# Patient Record
Sex: FEMALE | Race: WHITE | NOT HISPANIC OR LATINO | Employment: FULL TIME | URBAN - METROPOLITAN AREA
[De-identification: names, ages, dates, MRNs, and addresses within clinical notes are randomized per-mention and may not be internally consistent; named-entity substitution may affect disease eponyms.]

---

## 2022-07-05 ASSESSMENT — PATIENT HEALTH QUESTIONNAIRE - PHQ9
10. IF YOU CHECKED OFF ANY PROBLEMS, HOW DIFFICULT HAVE THESE PROBLEMS MADE IT FOR YOU TO DO YOUR WORK, TAKE CARE OF THINGS AT HOME, OR GET ALONG WITH OTHER PEOPLE: VERY DIFFICULT
SUM OF ALL RESPONSES TO PHQ QUESTIONS 1-9: 21
SUM OF ALL RESPONSES TO PHQ QUESTIONS 1-9: 21

## 2022-07-07 ENCOUNTER — TELEPHONE (OUTPATIENT)
Dept: FAMILY MEDICINE | Facility: CLINIC | Age: 27
End: 2022-07-07

## 2022-07-07 ENCOUNTER — OFFICE VISIT (OUTPATIENT)
Dept: FAMILY MEDICINE | Facility: CLINIC | Age: 27
End: 2022-07-07
Payer: COMMERCIAL

## 2022-07-07 VITALS
RESPIRATION RATE: 20 BRPM | OXYGEN SATURATION: 90 % | SYSTOLIC BLOOD PRESSURE: 100 MMHG | HEART RATE: 82 BPM | WEIGHT: 173.2 LBS | DIASTOLIC BLOOD PRESSURE: 62 MMHG

## 2022-07-07 DIAGNOSIS — L70.0 ACNE VULGARIS: Primary | ICD-10-CM

## 2022-07-07 DIAGNOSIS — J30.9 ALLERGIC RHINITIS, UNSPECIFIED SEASONALITY, UNSPECIFIED TRIGGER: ICD-10-CM

## 2022-07-07 DIAGNOSIS — F33.2 SEVERE EPISODE OF RECURRENT MAJOR DEPRESSIVE DISORDER, WITHOUT PSYCHOTIC FEATURES (H): ICD-10-CM

## 2022-07-07 DIAGNOSIS — Z79.899 MEDICATION MANAGEMENT: ICD-10-CM

## 2022-07-07 LAB
ANION GAP SERPL CALCULATED.3IONS-SCNC: 11 MMOL/L (ref 7–15)
BUN SERPL-MCNC: 7.8 MG/DL (ref 6–20)
CALCIUM SERPL-MCNC: 9.7 MG/DL (ref 8.6–10)
CHLORIDE SERPL-SCNC: 95 MMOL/L (ref 98–107)
CREAT SERPL-MCNC: 0.71 MG/DL (ref 0.51–0.95)
DEPRECATED HCO3 PLAS-SCNC: 24 MMOL/L (ref 22–29)
GFR SERPL CREATININE-BSD FRML MDRD: >90 ML/MIN/1.73M2
GLUCOSE SERPL-MCNC: 80 MG/DL (ref 70–99)
POTASSIUM SERPL-SCNC: 4 MMOL/L (ref 3.4–5.3)
SODIUM SERPL-SCNC: 130 MMOL/L (ref 136–145)

## 2022-07-07 PROCEDURE — 99203 OFFICE O/P NEW LOW 30 MIN: CPT | Performed by: NURSE PRACTITIONER

## 2022-07-07 PROCEDURE — 80048 BASIC METABOLIC PNL TOTAL CA: CPT | Performed by: NURSE PRACTITIONER

## 2022-07-07 PROCEDURE — 36415 COLL VENOUS BLD VENIPUNCTURE: CPT | Performed by: NURSE PRACTITIONER

## 2022-07-07 RX ORDER — ARIPIPRAZOLE 5 MG/1
TABLET ORAL
COMMUNITY
Start: 2022-07-05 | End: 2023-02-21

## 2022-07-07 RX ORDER — SPIRONOLACTONE 100 MG/1
TABLET, FILM COATED ORAL
COMMUNITY
Start: 2018-06-01 | End: 2022-07-07

## 2022-07-07 RX ORDER — SPIRONOLACTONE 100 MG/1
100 TABLET, FILM COATED ORAL DAILY
Qty: 90 TABLET | Refills: 3 | Status: SHIPPED | OUTPATIENT
Start: 2022-07-07 | End: 2022-10-19

## 2022-07-07 ASSESSMENT — PAIN SCALES - GENERAL: PAINLEVEL: SEVERE PAIN (6)

## 2022-07-07 NOTE — PROGRESS NOTES
Assessment and Plan:     Acne vulgaris  This is controlled with spironolactone.  We will check BMP today.  - spironolactone (ALDACTONE) 100 MG tablet  Dispense: 90 tablet; Refill: 3    Allergic rhinitis, unspecified seasonality, unspecified trigger  Will refer to allergist for further evaluation. She continues Loratadine.   - Adult Allergy/Asthma Referral    Severe episode of recurrent major depressive disorder, without psychotic features (H)  This is uncontrolled.  She recently established with a psychiatrist.  She is starting Abilify.  I recommend close follow-up with her psychiatrist within the month.  She is content with the plan.    Medication management  - Basic metabolic panel  (Ca, Cl, CO2, Creat, Gluc, K, Na, BUN)  - Basic metabolic panel  (Ca, Cl, CO2, Creat, Gluc, K, Na, BUN)      Subjective:     Cindy is a 26 year old female presenting to the clinic for medication management.  Patient is interested in seeing an allergist or an ENT specialist.  Patient states she suffers from chronic allergies.  She has chronic sinus congestion which worsens in the spring.  She occasionally snores.  She denies postnasal drainage, sneezing, itchy eyes.  She denies history of allergy testing the past.  She has tried numerous antihistamines in the past and has been taking Claritin daily.  She does have a dog at home.  Patient saw dermatology previously for acne.  She is prescribed spironolactone which has provided significant assistance.  She requests refill today.  Patient has depression which is uncontrolled.  She is seeing an online psychiatrist who prescribed Abilify 5 mg daily.  Patient just picked up the prescription and plans on starting this today.  She denies thoughts of suicide.  Patient would like to have her Nexplanon removed.  We will assist with scheduling her with a provider to do so.    Reviewof Systems: A complete 14 point review of systems was obtained and is negative or as stated in the history of  present illness.    Social History     Socioeconomic History     Marital status: Single     Spouse name: Not on file     Number of children: Not on file     Years of education: Not on file     Highest education level: Not on file   Occupational History     Not on file   Tobacco Use     Smoking status: Former Smoker     Packs/day: 0.25     Years: 5.00     Pack years: 1.25     Types: Vaping Device, Cigarettes     Quit date: 2020     Years since quittin.1     Smokeless tobacco: Never Used   Vaping Use     Vaping Use: Every day     Substances: Nicotine     Devices: Pre-filled pod   Substance and Sexual Activity     Alcohol use: Yes     Alcohol/week: 12.0 standard drinks     Types: 12 Cans of beer per week     Drug use: Never     Sexual activity: Yes     Partners: Male     Birth control/protection: Implant     Comment: Nexplanon   Other Topics Concern     Parent/sibling w/ CABG, MI or angioplasty before 65F 55M? No   Social History Narrative     Not on file     Social Determinants of Health     Financial Resource Strain: Not on file   Food Insecurity: Not on file   Transportation Needs: Not on file   Physical Activity: Not on file   Stress: Not on file   Social Connections: Not on file   Intimate Partner Violence: Not on file   Housing Stability: Not on file       Active Ambulatory Problems     Diagnosis Date Noted     Major Depression, Single Episode      Urinary Tract Infection      Pain During Urination (Dysuria)      Resolved Ambulatory Problems     Diagnosis Date Noted     No Resolved Ambulatory Problems     Past Medical History:   Diagnosis Date     Depressive disorder 2014       Family History   Problem Relation Age of Onset     Depression Mother      Anxiety Disorder Father      Other Cancer Maternal Grandfather      Diabetes Paternal Grandmother      Anxiety Disorder Sister        Objective:     /62 (BP Location: Right arm, Patient Position: Sitting, Cuff Size: Adult Regular)   Pulse 82   Resp 20    Wt 78.6 kg (173 lb 3.2 oz)   LMP  (LMP Unknown)   SpO2 90%     Patient is alert, in no obvious distress.   Skin: Warm, dry.  No lesions or rashes.    Lungs:  Clear to auscultation. Respirations even and unlabored.  No wheezing or rales noted.   Heart:  Regular rate and rhythm.  No murmurs, S3, S4, gallops, or rubs.

## 2022-07-07 NOTE — TELEPHONE ENCOUNTER
lmtcb- pt has an apt with Siobhan today to discuss nexplanon removal, Siobhan does not do this procedure. Please let pt know if she wants it taken out it will have to be with another provider. Otherwise Siobhan will still be able to see her for an office visit for other concerns

## 2022-07-28 ENCOUNTER — OFFICE VISIT (OUTPATIENT)
Dept: FAMILY MEDICINE | Facility: CLINIC | Age: 27
End: 2022-07-28
Payer: COMMERCIAL

## 2022-07-28 VITALS — SYSTOLIC BLOOD PRESSURE: 107 MMHG | DIASTOLIC BLOOD PRESSURE: 67 MMHG | WEIGHT: 174.19 LBS | HEART RATE: 86 BPM

## 2022-07-28 DIAGNOSIS — Z30.46 NEXPLANON REMOVAL: Primary | ICD-10-CM

## 2022-07-28 PROCEDURE — 11982 REMOVE DRUG IMPLANT DEVICE: CPT | Performed by: FAMILY MEDICINE

## 2022-07-28 ASSESSMENT — PAIN SCALES - GENERAL: PAINLEVEL: NO PAIN (0)

## 2022-07-28 NOTE — PROGRESS NOTES
Assessment & Plan     Nexplanon removal    - Remove Contraceptive Implant - Nexplanon/Implanon     Nexplanon removal: Procedure and risks discussed with patient who voiced understanding and agreed to proceed with removal of implant.  Implant was identified in medial aspect of right upper arm and pen used to boo distal end of implant.  Skin cleansed with alcohol swab.  Local anesthetic administered with 1% lidocaine with epinephrine, approximately 2.5 mL. Skin was cleansed with 3 Betadine swabs.  Area draped in sterile fashion.  #15 scalpel was used to make an approximately 1 cm incision near the distal end of implant.  Subcutaneous tissue was dissected and distal end of implant was identified and grasped with a needle montoya.  Subcutaneous skin was further dissected and the implant was then removed in its entirety and placed in biohazard bag for disposal.  Patient tolerated procedure well.  Estimated blood loss minimal.  Skin cleansed with soap and water.  Steri-Strips placed over her incision site to approximate wound edges.  Bandage placed over incision and compression dressing applied.  Patient counseled to leave compression dressing on for 24 hours and then keep wound covered with bandage for 2-3 days.  Discussed she may experience bruising and soreness for the next couple of days.  May use ibuprofen and ice packs as needed for discomfort.  Monitor for signs of infection.  Follow-up if any concerns or problems                 No follow-ups on file.    Cammy Enciso MD  [facility]    Og White is a 26 year old, presenting for the following health issues:  Nexplanon removal      HPI   She comes in today for removal of Nexplanon.  This was placed in February 2021.  No other concerns or questions.  Does not desire alternative contraception at this time.  Meds and allergies reviewed and updated          Review of Systems         Objective    /67   Pulse 86   Wt 79 kg  (174 lb 3 oz)   LMP  (LMP Unknown)   There is no height or weight on file to calculate BMI.  Physical Exam   GENERAL: healthy, alert and no distress  PSYCH: mentation appears normal, affect normal/bright  Skin: nexplanon palpated in medial aspect of right upper arm

## 2022-08-07 ENCOUNTER — HEALTH MAINTENANCE LETTER (OUTPATIENT)
Age: 27
End: 2022-08-07

## 2022-08-11 ENCOUNTER — TELEPHONE (OUTPATIENT)
Dept: FAMILY MEDICINE | Facility: CLINIC | Age: 27
End: 2022-08-11
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
## 2022-08-12 ENCOUNTER — OFFICE VISIT (OUTPATIENT)
Dept: INTERNAL MEDICINE | Facility: CLINIC | Age: 27
End: 2022-08-12
Payer: COMMERCIAL

## 2022-08-12 VITALS
WEIGHT: 174.4 LBS | HEART RATE: 85 BPM | TEMPERATURE: 99.1 F | OXYGEN SATURATION: 99 % | DIASTOLIC BLOOD PRESSURE: 69 MMHG | SYSTOLIC BLOOD PRESSURE: 106 MMHG

## 2022-08-12 DIAGNOSIS — M25.511 ACUTE PAIN OF RIGHT SHOULDER: ICD-10-CM

## 2022-08-12 DIAGNOSIS — S46.819A STRAIN OF TRAPEZIUS MUSCLE, UNSPECIFIED LATERALITY, INITIAL ENCOUNTER: ICD-10-CM
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
## 2022-08-12 DIAGNOSIS — S46.001A ROTATOR CUFF INJURY, RIGHT, INITIAL ENCOUNTER: ICD-10-CM

## 2022-08-12 DIAGNOSIS — M54.2 NECK PAIN: ICD-10-CM

## 2022-08-12 PROCEDURE — 99213 OFFICE O/P EST LOW 20 MIN: CPT | Performed by: INTERNAL MEDICINE

## 2022-08-12 RX ORDER — CYCLOBENZAPRINE HCL 5 MG
5 TABLET ORAL
Qty: 14 TABLET | Refills: 0 | Status: SHIPPED | OUTPATIENT
Start: 2022-08-12 | End: 2022-12-19

## 2022-08-12 ASSESSMENT — PAIN SCALES - GENERAL: PAINLEVEL: EXTREME PAIN (8)

## 2022-08-12 ASSESSMENT — PATIENT HEALTH QUESTIONNAIRE - PHQ9
SUM OF ALL RESPONSES TO PHQ QUESTIONS 1-9: 13
SUM OF ALL RESPONSES TO PHQ QUESTIONS 1-9: 13
10. IF YOU CHECKED OFF ANY PROBLEMS, HOW DIFFICULT HAVE THESE PROBLEMS MADE IT FOR YOU TO DO YOUR WORK, TAKE CARE OF THINGS AT HOME, OR GET ALONG WITH OTHER PEOPLE: SOMEWHAT DIFFICULT

## 2022-08-12 NOTE — PATIENT INSTRUCTIONS
PT. Myofascial release, for neck and shoulder.   Ibuprofen 400-600mg, with food  2-3 times a day with food.  Muscle relaxant at bed time: Flexeril   Take vitamin D supplement 1000 units a day.
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
## 2022-08-15 ENCOUNTER — VIRTUAL VISIT (OUTPATIENT)
Dept: FAMILY MEDICINE | Facility: CLINIC | Age: 27
End: 2022-08-15
Payer: COMMERCIAL

## 2022-08-15 DIAGNOSIS — Z30.016 ENCOUNTER FOR INITIAL PRESCRIPTION OF TRANSDERMAL PATCH HORMONAL CONTRACEPTIVE DEVICE: Primary | ICD-10-CM

## 2022-08-15 PROCEDURE — 99213 OFFICE O/P EST LOW 20 MIN: CPT | Mod: GT | Performed by: PHYSICIAN ASSISTANT

## 2022-08-15 RX ORDER — NORELGESTROMIN AND ETHINYL ESTRADIOL 35; 150 UG/MG; UG/MG
PATCH TRANSDERMAL
Qty: 3 PATCH | Refills: 11 | Status: SHIPPED | OUTPATIENT
Start: 2022-08-15 | End: 2022-12-19

## 2022-08-15 NOTE — PROGRESS NOTES
Cindy is a 26 year old who is being evaluated via a billable video visit.      How would you like to obtain your AVS? MyChart  If the video visit is dropped, the invitation should be resent by: Text to cell phone: [phone]  Will anyone else be joining your video visit? No        Assessment & Plan       5. Encounter for initial prescription of transdermal patch hormonal contraceptive device  Discussed different birth control options including copper IUD, barrier methods, nuva ring, patch and OCP.  She is not interested in nexplanon again. She wants to try the patch.  I also suggested a low dose pill if she wants to consider other options. Uses and SE of the patch discussed.  She will follow up with her PCP as needed.  Due for pap.  - norelgestromin-ethinyl estradiol (ORTHO EVRA) 150-35 MCG/24HR patch; Remove old patch and apply new patch onto the skin once a week for 3 weeks (21 days). Do not wear patch week 4 (days 22-28), then repeat.  Dispense: 3 patch; Refill: 11      [facility]   Cindy is a 26 year old, presenting for the following health issues:  Contraception      Contraception       Cindy presents via video visit to discuss contraception.   She has tried various forms of contraception ( OCP, Nexplanon, DEPO, IUD).  She had SE with each different type.  Most recently she had her Nexplanon removed.   She is interested in trying transdermal patch.  She struggled with her mood while on OCP several years ago. Does not recall which pill this was.   Periods are typically regular, last was 2 weeks ago.  No current concerns for pregnancy.    Review of Systems   Constitutional, HEENT, cardiovascular, pulmonary, GI, , musculoskeletal, neuro, skin, endocrine and psych systems are negative, except as otherwise noted.      Objective    Vitals - Patient Reported  Weight (Patient Reported): 78.9 kg (174 lb)  Height (Patient Reported):  "154.3 cm (5' 0.75\")  BMI (Based on Pt Reported Ht/Wt): 33.15        Physical Exam   GENERAL: Healthy, alert and no distress  EYES: Eyes grossly normal to inspection.  No discharge or erythema, or obvious scleral/conjunctival abnormalities.  RESP: No audible wheeze, cough, or visible cyanosis.  No visible retractions or increased work of breathing.    SKIN: Visible skin clear. No significant rash, abnormal pigmentation or lesions.  NEURO: Cranial nerves grossly intact.  Mentation and speech appropriate for age.  PSYCH: Mentation appears normal, affect normal/bright, judgement and insight intact, normal speech and appearance well-groomed.        Video-Visit Details    Video Start Time:2 pm    Type of service:  Video Visit    Video End Time 220 pm    Originating Location (pt. Location): Home    Distant Location (provider location):  [facility]     Platform used for Video Visit: [employer]    .  ..  "

## 2022-08-16 ENCOUNTER — HOSPITAL ENCOUNTER (OUTPATIENT)
Dept: PHYSICAL THERAPY | Facility: REHABILITATION | Age: 27
Discharge: HOME | End: 2022-08-16
Attending: INTERNAL MEDICINE
Payer: COMMERCIAL

## 2022-08-16 DIAGNOSIS — S46.001A ROTATOR CUFF INJURY, RIGHT, INITIAL ENCOUNTER: ICD-10-CM

## 2022-08-16 DIAGNOSIS — M54.2 NECK PAIN: Primary | ICD-10-CM

## 2022-08-16 DIAGNOSIS — S46.819A STRAIN OF TRAPEZIUS MUSCLE, UNSPECIFIED LATERALITY, INITIAL ENCOUNTER: ICD-10-CM

## 2022-08-16 DIAGNOSIS — M25.511 ACUTE PAIN OF RIGHT SHOULDER: ICD-10-CM
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
## 2022-08-16 PROCEDURE — 97110 THERAPEUTIC EXERCISES: CPT | Mod: GP | Performed by: PHYSICAL THERAPIST

## 2022-08-16 PROCEDURE — 97161 PT EVAL LOW COMPLEX 20 MIN: CPT | Mod: GP | Performed by: PHYSICAL THERAPIST
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
## 2022-08-23 ENCOUNTER — HOSPITAL ENCOUNTER (OUTPATIENT)
Dept: PHYSICAL THERAPY | Facility: REHABILITATION | Age: 27
Discharge: HOME | End: 2022-08-23
Payer: COMMERCIAL

## 2022-08-23 DIAGNOSIS — S46.819A STRAIN OF TRAPEZIUS MUSCLE, UNSPECIFIED LATERALITY, INITIAL ENCOUNTER: ICD-10-CM
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
## 2022-08-23 DIAGNOSIS — M25.511 ACUTE PAIN OF RIGHT SHOULDER: ICD-10-CM

## 2022-08-23 DIAGNOSIS — M54.2 NECK PAIN: ICD-10-CM

## 2022-08-23 DIAGNOSIS — S46.001A ROTATOR CUFF INJURY, RIGHT, INITIAL ENCOUNTER: ICD-10-CM

## 2022-08-23 PROCEDURE — 97110 THERAPEUTIC EXERCISES: CPT | Mod: GP | Performed by: PHYSICAL THERAPIST

## 2022-08-23 PROCEDURE — 97140 MANUAL THERAPY 1/> REGIONS: CPT | Mod: GP | Performed by: PHYSICAL THERAPIST

## 2022-08-30 NOTE — TELEPHONE ENCOUNTER
Spoke with pt about NS#1- did not have this appointment on her schedule. Reviewed remaining PT appointments and pt verbalized she has them on her schedule as well.    Jennifer Robertson, PT

## 2022-09-13 ENCOUNTER — TELEPHONE (OUTPATIENT)
Dept: PHYSICAL THERAPY | Facility: REHABILITATION | Age: 27
End: 2022-09-13

## 2022-09-20 ENCOUNTER — TELEPHONE (OUTPATIENT)
Dept: PHYSICAL THERAPY | Facility: REHABILITATION | Age: 27
End: 2022-09-20

## 2022-09-20 NOTE — PROGRESS NOTES
[facility]    Outpatient Physical Therapy Discharge Note  Patient: Cindy Contreras  : 1995    Beginning/End Dates of Reporting Period:  22 to 22    Referring Provider: Julianne Loza MD    Therapy Diagnosis: acute rose marie cervical spine pain, acute right shoulder pain, myofascial pain     Client Self Report: Pt reports locked jaw about every other day that resolves within 5-10 min. It can be a side effect of one of her medications that she started about 2 months.    Objective Measurements:  Objective Measure: Pain: 5-6/10 lower cervical and upper thoracic spine R>L     Objective Measure: Cervical AROM  Details: rotation:R mild with ERP, L WNL         Goals:  Goal Identifier HEP   Goal Description Pt will be independent in HEP to address impairments and improve pain/function   Target Date 09/15/22   Date Met      Progress (detail required for progress note): working on daily- will continue to progress     Goal Identifier Pain   Goal Description Pt will report <3/10 pain in her neck and right shoulder throughout the day to improve her quality of life   Target Date 22   Date Met      Progress (detail required for progress note):       Goal Identifier Sleeping   Goal Description Pt will be able to sleep without waking from pain >5 nights per week to improve quality of sleep and return to prior level of function   Target Date 22   Date Met      Progress (detail required for progress note): improving- still waking up but not for as long     Goal Identifier Lifting   Goal Description Pt will be able to lift 20# without pain for work and household chores   Target Date 22   Date Met      Progress (detail required for progress note):             Plan:  Discharge from therapy.    Discharge:    Reason for Discharge: Pt discharged per attendance policy (2 no show and 2 late cancelled  appts). Pt will need a new order to return to PT.     Equipment Issued: none    Discharge Plan: Patient to continue home program. Pt to follow up with PCP if needed.    Jennifer Robertson, PT, DPT, LIZETH

## 2022-09-20 NOTE — ADDENDUM NOTE
Encounter addended by: Jennifer Robertson, PT on: 9/20/2022 9:50 AM   Actions taken: Episode resolved, Clinical Note Signed

## 2022-09-20 NOTE — TELEPHONE ENCOUNTER
Spoke with pt about no shows and late cancelled appt. Per attendance policy, pt's remaining appts will be cancelled and she will need a new order to return to PT. Pt felt her work schedule made it difficult for her to attend PT. Pt states she felt she was doing better and will continue with her exercises. Pt to follow up with doctor if pain is not continuing to improve or she would like to re-start PT.    Jennifer Robertson, PT

## 2022-10-19 DIAGNOSIS — L70.0 ACNE VULGARIS: ICD-10-CM

## 2022-10-19 RX ORDER — SPIRONOLACTONE 100 MG/1
100 TABLET, FILM COATED ORAL DAILY
Qty: 90 TABLET | Refills: 3 | Status: SHIPPED | OUTPATIENT
Start: 2022-10-19 | End: 2023-09-14

## 2022-10-19 NOTE — TELEPHONE ENCOUNTER
Pending Prescriptions:                       Disp   Refills    spironolactone (ALDACTONE) 100 MG tablet  90 tab*3            Sig: Take 1 tablet (100 mg) by mouth daily    Pt is calling stating she is having issuing filling the prescription at [facility] and would like to switch to [facility]. Patient has been out for 5 days because they are still processing and just wants to get her prescription.     Please send over as soon as possible.

## 2022-10-23 ENCOUNTER — HEALTH MAINTENANCE LETTER (OUTPATIENT)
Age: 27
End: 2022-10-23

## 2022-11-03 ENCOUNTER — OFFICE VISIT (OUTPATIENT)
Dept: ALLERGY | Facility: CLINIC | Age: 27
End: 2022-11-03
Payer: COMMERCIAL

## 2022-11-03 VITALS — BODY MASS INDEX: 31.15 KG/M2 | OXYGEN SATURATION: 96 % | HEIGHT: 61 IN | HEART RATE: 86 BPM | WEIGHT: 165 LBS

## 2022-11-03 DIAGNOSIS — J30.1 SEASONAL ALLERGIC RHINITIS DUE TO POLLEN: Primary | ICD-10-CM

## 2022-11-03 DIAGNOSIS — J30.89 ALLERGIC RHINITIS CAUSED BY MOLD: ICD-10-CM

## 2022-11-03 DIAGNOSIS — J30.9 ALLERGIC RHINITIS, UNSPECIFIED SEASONALITY, UNSPECIFIED TRIGGER: ICD-10-CM

## 2022-11-03 DIAGNOSIS — J30.89 ALLERGIC RHINITIS DUE TO DUST MITE: ICD-10-CM

## 2022-11-03 DIAGNOSIS — Z91.018 TREE NUT ALLERGY: ICD-10-CM

## 2022-11-03 DIAGNOSIS — R09.81 NASAL CONGESTION: ICD-10-CM

## 2022-11-03 DIAGNOSIS — J30.81 ALLERGIC RHINITIS DUE TO ANIMALS: ICD-10-CM

## 2022-11-03 PROCEDURE — 99204 OFFICE O/P NEW MOD 45 MIN: CPT | Mod: 25 | Performed by: ALLERGY & IMMUNOLOGY

## 2022-11-03 PROCEDURE — 95004 PERQ TESTS W/ALRGNC XTRCS: CPT | Performed by: ALLERGY & IMMUNOLOGY

## 2022-11-03 RX ORDER — AZELASTINE 1 MG/ML
2 SPRAY, METERED NASAL 2 TIMES DAILY
Qty: 30 ML | Refills: 3 | Status: SHIPPED | OUTPATIENT
Start: 2022-11-03 | End: 2023-01-23

## 2022-11-03 RX ORDER — EPINEPHRINE 0.3 MG/.3ML
INJECTION SUBCUTANEOUS
Qty: 4 EACH | Refills: 0 | Status: SHIPPED | OUTPATIENT
Start: 2022-11-03 | End: 2023-01-23

## 2022-11-03 RX ORDER — ESCITALOPRAM OXALATE 5 MG/1
5 TABLET ORAL DAILY
COMMUNITY
Start: 2022-10-19 | End: 2023-07-22

## 2022-11-03 NOTE — LETTER
11/3/2022         RE: Cindy Contreras  [address]  [address]  [place] [address]        Dear Colleague,    Thank you for referring your patient, Cindy Contreras, to the [facility]. Please see a copy of my visit note below.          Og Manning is a 27 year old, presenting for the following health issues:  Allergy Consult      HPI   Chief complaint: Allergy concerns    History of present illness: This is a pleasant 27-year-old woman I was asked to see for evaluation by Siobhan Covington in regards to allergies.  Patient states that she had allergy symptoms her whole life.  She states symptoms worsen during the spring and fall but she does have symptoms throughout the year.  Symptoms consist of nasal congestion, runny nose, itchy eyes, drainage.  She states that she uses Claritin which helps.  Does not completely resolve the symptoms, however.  She tried Flonase regularly which did not improve symptoms.  She did note she had a reaction to tree nuts when she was 12 years of age.  She had tongue swelling and itchy throat.  She never been tested for this and does not carry a current epinephrine device.  She states she has them on occasion and noticed itchy mouth and throat.  She did have 1 episode where she ate a lot of nuts and had throwing up.  No history of asthma.  No cough, wheeze or shortness of breath.  She does note itching with raw soy as well as raw apples.  She does note a lot of nasal congestion and wonders if she had nasal polyps.  She is never been seen by ENT.  She does note a slightly decreased sense of smell but she states she was a smoker and wonders if this affects her sense of smell.    Past medical history: Bipolar II    Social history: She works in , has a dog, lives in an apartment without central air, has a basement, radiator heat, former smoker    Family history: Father with allergies      Review of Systems   Constitutional, HEENT,  "cardiovascular, pulmonary, GI, , musculoskeletal, neuro, skin, endocrine and psych systems are negative, except as otherwise noted.     Objective    Pulse 86   Ht 1.549 m (5' 1\")   Wt 74.8 kg (165 lb)   LMP 11/01/2022 (Exact Date)   SpO2 96%   BMI 31.18 kg/m    Body mass index is 31.18 kg/m .  Physical Exam   Gen: Pleasant female not in acute distress  HEENT: Eyes no erythema of the bulbar or palpebral conjunctiva, no edema. Ears: TMs well visualized, no effusions. Nose: congestion, Thick mucous on the left near the inferior turbinate, difficult to tell if this is a polyp,mucosa normal. Mouth: Throat clear, no lip or tongue edema.   Cardiac: Regular rate and rhythm, no murmurs, rubs or gallops  Respiratory: Clear to auscultation bilaterally, no adventitious breath sounds  Lymph: No   Visible supraclavicular or cervical lymphadenopathy  Skin: No rashes or lesions  Psych: Alert and oriented times 3    37 percutaneous test were undertaken to the environmental skin test panel and tree nut.  Positive histamine control with positive test to walnut, pecan, cashew, pistachio, tree pollen, grass pollen, weed pollen, mold, dust mites and cat and dog.  Please see scanned photographs.    Impression report and plan:  1.  Allergic rhinitis  2.  Oral allergy syndrome  3.  Possible true tree nut allergy    Recommend that the patient carry an epinephrine device given her vomiting she has had previously with tree nuts and her responses today.  This could be secondary to oral allergy syndrome, however.  Reviewed environmental control.  Avoid foods that cause difficulty in the raw form.  I went over the risks and benefits of allergy shots.  I stated risks include hives, swelling, shortness of breath.  I did state that one in 2.5 million shot administrations can result in death.  I stated they must wait in the office for 30 minutes following the shot and carry an epinephrine device on the day of the shot.  I stated that shots are " effective in about 90% of patients.  I stated that they should check with the insurance company prior to proceeding.  They understand the risks and benefits and agreed to proceed.  Both consent forms were signed and scanned into the record.    4.  Concern for nasal polyps    Patient is concerned about nasal polyps.  Difficult to tell today on exam.  Recommended ENT referral.                 Again, thank you for allowing me to participate in the care of your patient.        Sincerely,        Idania NEELY MD

## 2022-11-03 NOTE — PROGRESS NOTES
"      Og Manning is a 27 year old, presenting for the following health issues:  Allergy Consult      HPI   Chief complaint: Allergy concerns    History of present illness: This is a pleasant 27-year-old woman I was asked to see for evaluation by Siobhan Covington in regards to allergies.  Patient states that she had allergy symptoms her whole life.  She states symptoms worsen during the spring and fall but she does have symptoms throughout the year.  Symptoms consist of nasal congestion, runny nose, itchy eyes, drainage.  She states that she uses Claritin which helps.  Does not completely resolve the symptoms, however.  She tried Flonase regularly which did not improve symptoms.  She did note she had a reaction to tree nuts when she was 12 years of age.  She had tongue swelling and itchy throat.  She never been tested for this and does not carry a current epinephrine device.  She states she has them on occasion and noticed itchy mouth and throat.  She did have 1 episode where she ate a lot of nuts and had throwing up.  No history of asthma.  No cough, wheeze or shortness of breath.  She does note itching with raw soy as well as raw apples.  She does note a lot of nasal congestion and wonders if she had nasal polyps.  She is never been seen by ENT.  She does note a slightly decreased sense of smell but she states she was a smoker and wonders if this affects her sense of smell.    Past medical history: Bipolar II    Social history: She works in , has a dog, lives in an apartment without central air, has a basement, radiator heat, former smoker    Family history: Father with allergies      Review of Systems   Constitutional, HEENT, cardiovascular, pulmonary, GI, , musculoskeletal, neuro, skin, endocrine and psych systems are negative, except as otherwise noted.      Objective    Pulse 86   Ht 1.549 m (5' 1\")   Wt 74.8 kg (165 lb)   LMP 11/01/2022 (Exact Date)   SpO2 96%   BMI 31.18 kg/m    Body " mass index is 31.18 kg/m .  Physical Exam   Gen: Pleasant female not in acute distress  HEENT: Eyes no erythema of the bulbar or palpebral conjunctiva, no edema. Ears: TMs well visualized, no effusions. Nose: congestion, Thick mucous on the left near the inferior turbinate, difficult to tell if this is a polyp,mucosa normal. Mouth: Throat clear, no lip or tongue edema.   Cardiac: Regular rate and rhythm, no murmurs, rubs or gallops  Respiratory: Clear to auscultation bilaterally, no adventitious breath sounds  Lymph: No   Visible supraclavicular or cervical lymphadenopathy  Skin: No rashes or lesions  Psych: Alert and oriented times 3    37 percutaneous test were undertaken to the environmental skin test panel and tree nut.  Positive histamine control with positive test to walnut, pecan, cashew, pistachio, tree pollen, grass pollen, weed pollen, mold, dust mites and cat and dog.  Please see scanned photographs.    Impression report and plan:  1.  Allergic rhinitis  2.  Oral allergy syndrome  3.  Possible true tree nut allergy    Recommend that the patient carry an epinephrine device given her vomiting she has had previously with tree nuts and her responses today.  This could be secondary to oral allergy syndrome, however.  Reviewed environmental control.  Avoid foods that cause difficulty in the raw form.  I went over the risks and benefits of allergy shots.  I stated risks include hives, swelling, shortness of breath.  I did state that one in 2.5 million shot administrations can result in death.  I stated they must wait in the office for 30 minutes following the shot and carry an epinephrine device on the day of the shot.  I stated that shots are effective in about 90% of patients.  I stated that they should check with the insurance company prior to proceeding.  They understand the risks and benefits and agreed to proceed.  Both consent forms were signed and scanned into the record.    4.  Concern for nasal  polyps    Patient is concerned about nasal polyps.  Difficult to tell today on exam.  Recommended ENT referral.

## 2022-11-03 NOTE — LETTER
ANAPHYLAXIS ALLERGY PLAN    Name: Cindy Contreras      :  1995    Allergy to:  Tree nut    Weight: 165 lbs 0 oz           Asthma:  No      Do not depend on antihistamines or inhalers (bronchodilators) to treat a severe reaction; USE EPINEPHRINE      MEDICATIONS/DOSES  Epinephrine:    Epinephrine dose:  0.3 mg IM  Antihistamine:  Zyrtec (Cetirizine)  Antihistamine dose:  10 mg        ANAPHYLAXIS ALLERGY PLAN (Page 2)  Patient:  Cindy Contreras  :  1995         Electronically signed on November 3, 2022 by:  Idania NEELY MD  Parent/Guardian Authorization Signature:  ___________________________ Date:    FORM PROVIDED [employer] (FARE) ([email or web address]) 2017

## 2022-11-28 ENCOUNTER — ALLIED HEALTH/NURSE VISIT (OUTPATIENT)
Dept: ALLERGY | Facility: CLINIC | Age: 27
End: 2022-11-28
Payer: COMMERCIAL

## 2022-11-28 DIAGNOSIS — J30.89 ALLERGIC RHINITIS DUE TO DUST MITE: ICD-10-CM

## 2022-11-28 DIAGNOSIS — J30.1 SEASONAL ALLERGIC RHINITIS DUE TO POLLEN: Primary | ICD-10-CM

## 2022-11-28 DIAGNOSIS — J30.81 ALLERGIC RHINITIS DUE TO ANIMALS: ICD-10-CM

## 2022-11-28 DIAGNOSIS — J30.89 ALLERGIC RHINITIS CAUSED BY MOLD: ICD-10-CM

## 2022-11-28 PROCEDURE — 95117 IMMUNOTHERAPY INJECTIONS: CPT

## 2022-11-28 NOTE — PROGRESS NOTES
Cindy Contreras presents to clinic today at the request of Idania Herrera MD (ordering provider) for Allergy Immunotherapy injection(s).       This service provided today was under the care of Idania Herrera MD; the supervising provider of the day; who was available if needed.    Mary Saldaña

## 2022-12-02 ENCOUNTER — ALLIED HEALTH/NURSE VISIT (OUTPATIENT)
Dept: ALLERGY | Facility: CLINIC | Age: 27
End: 2022-12-02
Payer: COMMERCIAL

## 2022-12-02 DIAGNOSIS — J30.89 ALLERGIC RHINITIS DUE TO DUST MITE: ICD-10-CM

## 2022-12-02 DIAGNOSIS — J30.1 SEASONAL ALLERGIC RHINITIS DUE TO POLLEN: Primary | ICD-10-CM

## 2022-12-02 DIAGNOSIS — J30.89 ALLERGIC RHINITIS CAUSED BY MOLD: ICD-10-CM

## 2022-12-02 DIAGNOSIS — J30.81 ALLERGIC RHINITIS DUE TO ANIMALS: ICD-10-CM

## 2022-12-02 PROCEDURE — 95117 IMMUNOTHERAPY INJECTIONS: CPT

## 2022-12-02 PROCEDURE — 95165 ANTIGEN THERAPY SERVICES: CPT | Performed by: ALLERGY & IMMUNOLOGY

## 2022-12-02 NOTE — PROGRESS NOTES
MM/DFM/DPM/AP DOG/RW  1:1000 V/V EXP 1/15/2023  1:100 V/V EXP 3/15/2023  1:10 V/V EXP 11/15/2023  1:1 V/V EXP 11/15/2023    CHARGED 30 UNITS  CHECKED BY LS

## 2022-12-02 NOTE — PROGRESS NOTES
Cindy Contreras presents to clinic today at the request of Idania Herrera MD (ordering provider) for Allergy Immunotherapy injection(s).       This service provided today was under the care of Idania Herrera MD; the supervising provider of the day; who was available if needed.    Bisi Lane

## 2022-12-05 DIAGNOSIS — J30.89 ALLERGIC RHINITIS CAUSED BY MOLD: ICD-10-CM

## 2022-12-05 DIAGNOSIS — J30.89 ALLERGIC RHINITIS DUE TO DUST MITE: ICD-10-CM

## 2022-12-05 DIAGNOSIS — J30.1 SEASONAL ALLERGIC RHINITIS DUE TO POLLEN: Primary | ICD-10-CM

## 2022-12-05 PROCEDURE — 95165 ANTIGEN THERAPY SERVICES: CPT | Performed by: ALLERGY & IMMUNOLOGY

## 2022-12-05 NOTE — PROGRESS NOTES
GRASS/WEED/CAT  1:1000 V/V EXP 1/17/2023  1:100 V/V EXP 3/17/2023  1:10 V/V EXP 11/17/2023  1: 1 V/V EXP 11/17/2023    CHECKED BY LS  CHARGED 30UNITS

## 2022-12-09 DIAGNOSIS — J30.1 SEASONAL ALLERGIC RHINITIS DUE TO POLLEN: Primary | ICD-10-CM

## 2022-12-09 DIAGNOSIS — J30.89 ALLERGIC RHINITIS DUE TO DUST MITE: ICD-10-CM

## 2022-12-09 DIAGNOSIS — J30.81 ALLERGIC RHINITIS DUE TO ANIMALS: ICD-10-CM

## 2022-12-09 DIAGNOSIS — J30.89 ALLERGIC RHINITIS CAUSED BY MOLD: ICD-10-CM

## 2022-12-09 PROCEDURE — 95165 ANTIGEN THERAPY SERVICES: CPT | Performed by: ALLERGY & IMMUNOLOGY

## 2022-12-09 NOTE — PROGRESS NOTES
TREES  1:1000 V/V EXP 1/22/2023  1:100 V/V EXP 3/22/2023  1:10 V/V EXP 11/22/2023  1:1 V/V EXP 11/22/2023    CHECKED BY LS  CHARGED 30 UNITS

## 2022-12-19 ENCOUNTER — OFFICE VISIT (OUTPATIENT)
Dept: ALLERGY | Facility: CLINIC | Age: 27
End: 2022-12-19
Payer: COMMERCIAL

## 2022-12-19 VITALS
WEIGHT: 173 LBS | OXYGEN SATURATION: 96 % | BODY MASS INDEX: 32.69 KG/M2 | HEART RATE: 80 BPM | RESPIRATION RATE: 16 BRPM

## 2022-12-19 DIAGNOSIS — J30.89 ALLERGIC RHINITIS DUE TO DUST MITE: ICD-10-CM

## 2022-12-19 DIAGNOSIS — J30.1 SEASONAL ALLERGIC RHINITIS DUE TO POLLEN: Primary | ICD-10-CM

## 2022-12-19 DIAGNOSIS — J30.89 ALLERGIC RHINITIS CAUSED BY MOLD: ICD-10-CM

## 2022-12-19 PROCEDURE — 95117 IMMUNOTHERAPY INJECTIONS: CPT | Performed by: ALLERGY & IMMUNOLOGY

## 2022-12-19 PROCEDURE — 99213 OFFICE O/P EST LOW 20 MIN: CPT | Mod: 25 | Performed by: ALLERGY & IMMUNOLOGY

## 2022-12-19 NOTE — LETTER
12/19/2022         RE: Cindy Contreras  [address]  [address]  [place] [address]        Dear Colleague,    Thank you for referring your patient, Cindy Contreras, to the [facility]. Please see a copy of my visit note below.          Subjective   Nasimy is a 27 year old, presenting for the following health issues:  RECHECK (Shot f/u)      HPI     Chief complaint: Follow-up allergies    History of present illness: This is a pleasant 27-year-old woman with a history of allergic rhinitis here today for follow-up visit.  She had to miss a few allergy shots due to the fact that she was sick with flu.  She states that she was sick about 6 days.  She is now feeling better.  For this reason, she has only received 0.1 mL of the 1: 1000 green vial.  No systemic reactions or site reactions.  Overall, allergy symptoms are unchanged.         Objective    Pulse 80   Resp 16   Wt 78.5 kg (173 lb)   LMP 11/01/2022 (Exact Date)   SpO2 96%   BMI 32.69 kg/m    Body mass index is 32.69 kg/m .  Physical Exam     Gen: Pleasant female not in acute distress  HEENT: Eyes no erythema of the bulbar or palpebral conjunctiva, no edema.   Skin: No rashes or lesions  Psych: Alert and appropriate for age    Impression report and plan:  1.  Allergic rhinitis    Continue allergy shots.  Continue current medication therapy.  Notify of systemic reaction.  Follow in 6 weeks.  Patient does not need to follow going into her blue vial.               Again, thank you for allowing me to participate in the care of your patient.        Sincerely,        Idania NEEYL MD

## 2022-12-19 NOTE — NURSING NOTE
Cindy FENG Contreras presents to clinic today at the request of Idania Herrera MD (ordering provider) for Allergy Immunotherapy injection(s).       This service provided today was under the care of Idania Herrera MD; the supervising provider of the day; who was available if needed.    Sheridan Kelly RN  
[employer]

## 2022-12-19 NOTE — PROGRESS NOTES
Og Manning is a 27 year old, presenting for the following health issues:  RECHECK (Shot f/u)      HPI     Chief complaint: Follow-up allergies    History of present illness: This is a pleasant 27-year-old woman with a history of allergic rhinitis here today for follow-up visit.  She had to miss a few allergy shots due to the fact that she was sick with flu.  She states that she was sick about 6 days.  She is now feeling better.  For this reason, she has only received 0.1 mL of the 1: 1000 green vial.  No systemic reactions or site reactions.  Overall, allergy symptoms are unchanged.          Objective    Pulse 80   Resp 16   Wt 78.5 kg (173 lb)   LMP 11/01/2022 (Exact Date)   SpO2 96%   BMI 32.69 kg/m    Body mass index is 32.69 kg/m .  Physical Exam     Gen: Pleasant female not in acute distress  HEENT: Eyes no erythema of the bulbar or palpebral conjunctiva, no edema.   Skin: No rashes or lesions  Psych: Alert and appropriate for age    Impression report and plan:  1.  Allergic rhinitis    Continue allergy shots.  Continue current medication therapy.  Notify of systemic reaction.  Follow in 6 weeks.  Patient does not need to follow going into her blue vial.

## 2022-12-21 ENCOUNTER — VIRTUAL VISIT (OUTPATIENT)
Dept: FAMILY MEDICINE | Facility: CLINIC | Age: 27
End: 2022-12-21
Payer: COMMERCIAL

## 2022-12-21 DIAGNOSIS — R63.5 WEIGHT GAIN: Primary | ICD-10-CM

## 2022-12-21 DIAGNOSIS — F33.1 MODERATE EPISODE OF RECURRENT MAJOR DEPRESSIVE DISORDER (H): ICD-10-CM

## 2022-12-21 PROCEDURE — 99213 OFFICE O/P EST LOW 20 MIN: CPT | Mod: TEL | Performed by: NURSE PRACTITIONER

## 2022-12-21 NOTE — PROGRESS NOTES
"Kerri is a 27 year old who is being evaluated via a billable telephone visit.      What phone number would you like to be contacted at? [phone]  How would you like to obtain your AVS? [place]    Distant Location (provider location):  Off-site    Assessment & Plan     Weight gain  We discussed patient's weight gain of approximately 40 pounds over the course of a year.  Most recent weight that we have on file earlier this month was 176 pounds.  She has tried several different diets, has increased her exercise routine significantly and has tried over-the-counter Hugh without any benefit.  She is interested in initiating medication for weight management, specifically semaglutide.  She is hoping that her PCP will manage this without having to go through weight loss clinic.  I discussed that I will place referral to the weight loss clinic and speak to her PCP about it.  She is due for an annual physical exam and I encouraged her to schedule this in the next few weeks.  - Comprehensive Weight Management    Moderate episode of recurrent major depressive disorder (H)  Recently started on Abilify 2.5 mg and Lexapro 5 mg.  Patient does not feel that either medication is contributing to the weight gain since her weight had been an issue prior to starting the medications.         BMI:   Estimated body mass index is 32.69 kg/m  as calculated from the following:    Height as of 11/3/22: 1.549 m (5' 1\").    Weight as of 12/19/22: 78.5 kg (173 lb).           No follow-ups on file.    ABILIO Escobar CNP  [facility]    Og Manning is a 27 year old, presenting for the following health issues:  No chief complaint on file.      HPI     Patient is here today for telephone visit to discuss weight gain concerns.  She reports gaining over 40 pounds in the last year despite making healthy lifestyle changes in regards to her diet and exercise regimen.  She has plateaued at her current weight.  She has been " trying different diets and even tried over-the-counter weight loss medication (Hugh) without any results.  She has increased her exercise regimen fairly significantly as well.  She does have diabetes and her family.  She is specifically asking about starting semaglutide but would like to avoid going through the weight loss clinic if possible.  Her medical history is significant for major depressive disorder.  She recently was started on Abilify in addition to Lexapro.  She does not feel that weight gain is medication induced as these medications were initiated after her weight had plateaued already.    Review of Systems   Review of Systems - pertinent positives noted in HPI, otherwise ROS is negative.        Objective           Vitals:  No vitals were obtained today due to virtual visit.    Physical Exam   healthy, alert and no distress  PSYCH: Alert and oriented times 3; coherent speech, normal   rate and volume, able to articulate logical thoughts, able   to abstract reason, no tangential thoughts, no hallucinations   or delusions  Her affect is normal and pleasant  RESP: No cough, no audible wheezing, able to talk in full sentences  Remainder of exam unable to be completed due to telephone visits          Phone call duration:  11 minutes

## 2022-12-27 ENCOUNTER — TELEPHONE (OUTPATIENT)
Dept: FAMILY MEDICINE | Facility: CLINIC | Age: 27
End: 2022-12-27

## 2022-12-27 ENCOUNTER — ALLIED HEALTH/NURSE VISIT (OUTPATIENT)
Dept: ALLERGY | Facility: CLINIC | Age: 27
End: 2022-12-27
Payer: COMMERCIAL

## 2022-12-27 DIAGNOSIS — J30.1 SEASONAL ALLERGIC RHINITIS DUE TO POLLEN: Primary | ICD-10-CM

## 2022-12-27 DIAGNOSIS — J30.89 ALLERGIC RHINITIS DUE TO DUST MITE: ICD-10-CM

## 2022-12-27 DIAGNOSIS — J30.89 ALLERGIC RHINITIS CAUSED BY MOLD: ICD-10-CM

## 2022-12-27 PROCEDURE — 95117 IMMUNOTHERAPY INJECTIONS: CPT

## 2022-12-27 NOTE — PROGRESS NOTES
Cindy FENG Contreras presents to clinic today at the request of Idania Herrera MD (ordering provider) for Allergy Immunotherapy injection(s).       This service provided today was under the care of Idania Herrera MD; the supervising provider of the day; who was available if needed.    Sheridan Kelly RN

## 2022-12-29 ENCOUNTER — MYC MEDICAL ADVICE (OUTPATIENT)
Dept: FAMILY MEDICINE | Facility: CLINIC | Age: 27
End: 2022-12-29

## 2022-12-29 NOTE — TELEPHONE ENCOUNTER
I left a mychart message to the patient explaining of Janas message and to call the clinic back if she is still in need of talking with her.

## 2022-12-29 NOTE — TELEPHONE ENCOUNTER
I am not sure what medication she is referring to.  I recommend a virtual visit to discuss this and consider checking labs.  Thanks.

## 2023-01-02 DIAGNOSIS — E66.9 OBESITY WITHOUT SERIOUS COMORBIDITY, UNSPECIFIED CLASSIFICATION, UNSPECIFIED OBESITY TYPE: Primary | ICD-10-CM

## 2023-01-02 NOTE — TELEPHONE ENCOUNTER
I am uncomfortable prescribing medication without discussing treatment options, side effects and checking labs.  Thanks.

## 2023-01-02 NOTE — TELEPHONE ENCOUNTER
----- Message from ABILIO Rod CNP sent at 1/1/2023  9:01 PM CST -----  See message below. Can you please have patient schedule a virtual appointment with Siobhan to discuss medication management for weight loss? Thanks!  Ciarra   ----- Message -----  From: Siobhan Covington APRN CNP  Sent: 12/25/2022   6:45 PM CST  To: ABILIO Rod CNP,     I am comfortable ordering this, but I would like to discuss it with her further first (and check some labs).  She may also want to see if her insurance covers the medication.  Please have her schedule a virtual appt with me.     Thanks!  Siobhan   ----- Message -----  From: Ciarra Cintron APRN CNP  Sent: 12/21/2022   3:19 PM CST  To: ABILIO Lima CNP,    I saw Jennifer today for a virtual visit to discuss weight gain concerns.  She has gained approximately 40 pounds over the course of the year despite making lifestyle changes etc.  She was wanting to discuss medication management, specifically semaglutide and prefers not to go through the weight loss clinic.  Is this something you feel comfortable managing?  I did place referral to the weight loss clinic but told her I would check with you to see if anybody at our clinic was comfortable managing.    Thanks!  Ciarra

## 2023-01-02 NOTE — TELEPHONE ENCOUNTER
Pt is calling stating that she already had a visit to discuss this medication. Pt was informed that Ciarra was going to discuss this further with Siobhan.   Please call patient or send [employer] message to discuss further.

## 2023-01-02 NOTE — TELEPHONE ENCOUNTER
"Virtual visit with Ciarra on 12/21/22 was documented that she wants 'Semiglutide'. Pasted below. Please advise if you are still uncomfortable prescribing.     \"Patient is here today for telephone visit to discuss weight gain concerns.  She reports gaining over 40 pounds in the last year despite making healthy lifestyle changes in regards to her diet and exercise regimen.  She has plateaued at her current weight.  She has been trying different diets and even tried over-the-counter weight loss medication (Hugh) without any results.  She has increased her exercise regimen fairly significantly as well.  She does have diabetes and her family.  She is specifically asking about starting semaglutide but would like to avoid going through the weight loss clinic if possible.  Her medical history is significant for major depressive disorder.  She recently was started on Abilify in addition to Lexapro.  She does not feel that weight gain is medication induced as these medications were initiated after her weight had plateaued already.\"  "

## 2023-01-03 ENCOUNTER — TELEPHONE (OUTPATIENT)
Dept: FAMILY MEDICINE | Facility: CLINIC | Age: 28
End: 2023-01-03

## 2023-01-03 NOTE — TELEPHONE ENCOUNTER
I sent a prescription to the pharmacy for Semaglutide .25 mg once weekly.  She will need to have the pharmacist show her how to use the injectable pen otherwise we can set her up with [facility] for assistance.  Please verify that she does not have a personal or family history of thyroid cancer or a personal history of gall bladder or pancreatic abnormality.  We can increase the dose after one month.  I encourage follow-up then.  Thanks.

## 2023-01-03 NOTE — TELEPHONE ENCOUNTER
Pt denied history concerns. Will be educated in use by pharmacist. Will schedule follow up in 1 month.

## 2023-01-04 ENCOUNTER — TELEPHONE (OUTPATIENT)
Dept: FAMILY MEDICINE | Facility: CLINIC | Age: 28
End: 2023-01-04

## 2023-01-04 NOTE — TELEPHONE ENCOUNTER
FYI - Status Update    Who is Calling: patient    Update: Patient attempted to  Semaglutide-Weight Management [facility] and was advised by Pharmacist that a Prior Authorization is needed.    Patient is requesting Prior Auth is submitted on her behalf    Does caller want a call/response back: Yes     Could we send this information to you in appiris or would you prefer to receive a phone call?:   Patient would like to be contacted via appiris

## 2023-01-04 NOTE — TELEPHONE ENCOUNTER
PA Initiation    Medication: Semaglutide-Weight Management 0.5 MG/0.5ML SOAJ  Insurance Company:  [facility]  Pharmacy Filling the Rx:  [facility] [place]  Filling Pharmacy Phone:  [phone]  Filling Pharmacy Fax:  [phone]  Start Date:  01/04/2023

## 2023-01-04 NOTE — TELEPHONE ENCOUNTER
Medication Question or Refill    Contacts       Type Contact Phone/Fax    01/03/2023 07:25 PM CST Phone (Incoming) Kerri Contreras (Self) [phone] (M)          What medication are you calling about (include dose and sig)?: Semaglutide    Controlled Substance Agreement on file:   CSA -- Patient Level:    CSA: None found at the patient level.       Who prescribed the medication?: Siobhan Covington    Do you need a refill? Yes:    Patient offered an appointment? No    Do you have any questions or concerns?  Yes, patient was told by [facility] pharmacy that she needs a prior authorization for the medication to be covered by insurance.      Preferred Pharmacy:  [id number] - [place], [place] - [address] [place] [address]  Phone: [phone] Fax: [phone]      Could we send this information to you [facility] or would you prefer to receive a phone call?:   Patient would prefer a phone call   Okay to leave a detailed message?: Yes at Cell number on file:    Telephone Information:   Mobile [phone]

## 2023-01-05 ENCOUNTER — TELEPHONE (OUTPATIENT)
Dept: FAMILY MEDICINE | Facility: CLINIC | Age: 28
End: 2023-01-05

## 2023-01-05 DIAGNOSIS — E66.9 OBESITY WITHOUT SERIOUS COMORBIDITY, UNSPECIFIED CLASSIFICATION, UNSPECIFIED OBESITY TYPE: Primary | ICD-10-CM

## 2023-01-05 DIAGNOSIS — Z83.3 FAMILY HISTORY OF DIABETES MELLITUS: ICD-10-CM

## 2023-01-05 RX ORDER — TIRZEPATIDE 2.5 MG/.5ML
2.5 INJECTION, SOLUTION SUBCUTANEOUS WEEKLY
Qty: 2 ML | Refills: 0 | Status: SHIPPED | OUTPATIENT
Start: 2023-01-05 | End: 2023-01-23

## 2023-01-05 NOTE — TELEPHONE ENCOUNTER
PRIOR AUTHORIZATION DENIED    Medication: Semaglutide-Weight Management 0.5 MG/0.5ML SOAJ    Denial Date: 1/5/2023    Denial Rational: Medication is excluded. Patient will need to pay out of pocket if she would like to get medication.         Appeal Information: Medication exclusions can not be appealed.

## 2023-01-05 NOTE — TELEPHONE ENCOUNTER
Patient's insurance is calling to suggest an alternative medication, Mounjaro (Tirzepatide). Insurance company recommends noting in prior auth that patient has a family history of diabetes.     Please advise.

## 2023-01-05 NOTE — TELEPHONE ENCOUNTER
PRIOR AUTHORIZATION DENIED    Medication: Tirzepatide (MOUNJARO) 2.5 MG/0.5ML SOPN - EPA DENIED    Denial Date: 1/5/2023    Denial Rational:      Appeal Information:

## 2023-01-05 NOTE — TELEPHONE ENCOUNTER
Central Prior Authorization Team   Phone: [phone]    PA Initiation    Medication: Semaglutide-Weight Management 0.5 MG/0.5ML SOAJ  Insurance Company: [employer] ([facility]) - Phone [phone] Fax [phone]  Pharmacy Filling the Rx: [id number] - [place], [place] - [address] [place] [address]  Filling Pharmacy Phone: [phone]  Filling Pharmacy Fax:    Start Date: 1/5/2023

## 2023-01-05 NOTE — TELEPHONE ENCOUNTER
Pt is calling about the Mounjaro denial. She's wondering if there's anything else that can be done to get it approved and if not, what other medication can she try? She'd also like to know if a compounding pharmacy would be a good option to get this script.

## 2023-01-05 NOTE — TELEPHONE ENCOUNTER
I sent a prescription for Mounjaro 2.5 mg once weekly for four weeks.  Please start prior auth.  Thanks.

## 2023-01-06 ENCOUNTER — ALLIED HEALTH/NURSE VISIT (OUTPATIENT)
Dept: ALLERGY | Facility: CLINIC | Age: 28
End: 2023-01-06
Payer: COMMERCIAL

## 2023-01-06 DIAGNOSIS — J30.89 ALLERGIC RHINITIS DUE TO DUST MITE: ICD-10-CM

## 2023-01-06 DIAGNOSIS — J30.89 ALLERGIC RHINITIS CAUSED BY MOLD: ICD-10-CM

## 2023-01-06 DIAGNOSIS — J30.1 SEASONAL ALLERGIC RHINITIS DUE TO POLLEN: Primary | ICD-10-CM

## 2023-01-06 PROCEDURE — 95117 IMMUNOTHERAPY INJECTIONS: CPT

## 2023-01-09 ENCOUNTER — ALLIED HEALTH/NURSE VISIT (OUTPATIENT)
Dept: ALLERGY | Facility: CLINIC | Age: 28
End: 2023-01-09
Payer: COMMERCIAL

## 2023-01-09 DIAGNOSIS — J30.1 SEASONAL ALLERGIC RHINITIS DUE TO POLLEN: Primary | ICD-10-CM

## 2023-01-09 PROCEDURE — 95117 IMMUNOTHERAPY INJECTIONS: CPT

## 2023-01-09 NOTE — PROGRESS NOTES
Cindy FENG Contreras presents to clinic today at the request of Idania Herrera MD (ordering provider) for Allergy Immunotherapy injection(s).       This service provided today was under the care of Idania Herrera MD; the supervising provider of the day; who was available if needed.    Belle Ortiz RN

## 2023-01-09 NOTE — TELEPHONE ENCOUNTER
Insurance is generally not covering these medications for obesity or family history.  She can look into good RX program as well as manufacture coupons.  I cannot get these medications covered.  Otherwise, the weight loss clinic offers a variety of oral treatment options for weight loss.  Thanks.

## 2023-01-09 NOTE — PROGRESS NOTES
Nurse went to discharge pt from clinic after 30 minutes from lobby, however pt was not in lobby at that time. Called pt twice, and they stated that they were in the bathroom. Periodically went to check to see if pt was out of the restroom, however the door was still closed after several minutes. Unclear if pt left clinic, as restroom door was still closed 20 minutes later as well.

## 2023-01-13 ENCOUNTER — ALLIED HEALTH/NURSE VISIT (OUTPATIENT)
Dept: ALLERGY | Facility: CLINIC | Age: 28
End: 2023-01-13
Payer: COMMERCIAL

## 2023-01-13 DIAGNOSIS — J30.89 ALLERGIC RHINITIS CAUSED BY MOLD: ICD-10-CM

## 2023-01-13 DIAGNOSIS — J30.89 ALLERGIC RHINITIS DUE TO DUST MITE: ICD-10-CM

## 2023-01-13 DIAGNOSIS — J30.81 ALLERGIC RHINITIS DUE TO ANIMALS: ICD-10-CM

## 2023-01-13 DIAGNOSIS — J30.1 SEASONAL ALLERGIC RHINITIS DUE TO POLLEN: Primary | ICD-10-CM

## 2023-01-13 PROCEDURE — 95117 IMMUNOTHERAPY INJECTIONS: CPT

## 2023-01-13 NOTE — PROGRESS NOTES
Cindy Contreras presents to clinic today at the request of Idania Herrear MD (ordering provider) for Allergy Immunotherapy injection(s).       This service provided today was under the care of Idania Herrera MD; the supervising provider of the day; who was available if needed.      Patient presented after waiting 30 minutes with no reaction to  injections. Discharged from clinic.    Belle Ortiz RN

## 2023-01-23 ENCOUNTER — VIRTUAL VISIT (OUTPATIENT)
Dept: FAMILY MEDICINE | Facility: CLINIC | Age: 28
End: 2023-01-23
Payer: COMMERCIAL

## 2023-01-23 ENCOUNTER — OFFICE VISIT (OUTPATIENT)
Dept: OTOLARYNGOLOGY | Facility: CLINIC | Age: 28
End: 2023-01-23
Attending: ALLERGY & IMMUNOLOGY
Payer: COMMERCIAL

## 2023-01-23 DIAGNOSIS — F31.81 BIPOLAR 2 DISORDER (H): ICD-10-CM

## 2023-01-23 DIAGNOSIS — E66.9 OBESITY WITHOUT SERIOUS COMORBIDITY, UNSPECIFIED CLASSIFICATION, UNSPECIFIED OBESITY TYPE: Primary | ICD-10-CM

## 2023-01-23 DIAGNOSIS — R09.81 NASAL CONGESTION: ICD-10-CM

## 2023-01-23 DIAGNOSIS — R09.81 CONGESTION OF PARANASAL SINUS: ICD-10-CM

## 2023-01-23 DIAGNOSIS — F33.1 MODERATE EPISODE OF RECURRENT MAJOR DEPRESSIVE DISORDER (H): ICD-10-CM

## 2023-01-23 DIAGNOSIS — J34.2 DEVIATED NASAL SEPTUM: Primary | ICD-10-CM

## 2023-01-23 DIAGNOSIS — L70.0 ACNE VULGARIS: ICD-10-CM

## 2023-01-23 PROBLEM — L70.9 ACNE: Status: ACTIVE | Noted: 2019-12-09

## 2023-01-23 PROBLEM — F17.200 SMOKER: Status: ACTIVE | Noted: 2019-12-09

## 2023-01-23 PROBLEM — Z91.09 ENVIRONMENTAL ALLERGIES: Status: ACTIVE | Noted: 2023-01-23

## 2023-01-23 PROBLEM — Z97.5 PRESENCE OF IUD: Status: ACTIVE | Noted: 2019-07-12

## 2023-01-23 PROCEDURE — 99204 OFFICE O/P NEW MOD 45 MIN: CPT | Performed by: OTOLARYNGOLOGY

## 2023-01-23 PROCEDURE — 99213 OFFICE O/P EST LOW 20 MIN: CPT | Mod: TEL | Performed by: NURSE PRACTITIONER

## 2023-01-23 RX ORDER — LORATADINE 10 MG/1
CAPSULE, LIQUID FILLED ORAL
COMMUNITY
End: 2023-02-21

## 2023-01-23 RX ORDER — LORATADINE 10 MG/1
10 TABLET ORAL DAILY PRN
COMMUNITY

## 2023-01-23 RX ORDER — CROMOLYN SODIUM 5.2 MG
AEROSOL, SPRAY WITH PUMP (ML) NASAL
Qty: 26 ML | Refills: 3 | Status: SHIPPED | OUTPATIENT
Start: 2023-01-23 | End: 2023-06-20

## 2023-01-23 RX ORDER — SPIRONOLACTONE 100 MG/1
1 TABLET, FILM COATED ORAL
COMMUNITY
Start: 2022-01-31 | End: 2023-02-21

## 2023-01-23 RX ORDER — ARIPIPRAZOLE 5 MG/1
1 TABLET ORAL
COMMUNITY
Start: 2022-07-05 | End: 2023-02-21

## 2023-01-23 ASSESSMENT — PATIENT HEALTH QUESTIONNAIRE - PHQ9
10. IF YOU CHECKED OFF ANY PROBLEMS, HOW DIFFICULT HAVE THESE PROBLEMS MADE IT FOR YOU TO DO YOUR WORK, TAKE CARE OF THINGS AT HOME, OR GET ALONG WITH OTHER PEOPLE: SOMEWHAT DIFFICULT
SUM OF ALL RESPONSES TO PHQ QUESTIONS 1-9: 11
SUM OF ALL RESPONSES TO PHQ QUESTIONS 1-9: 11

## 2023-01-23 NOTE — PROGRESS NOTES
CHIEF COMPLAINT: Patient presents with:  Nasal Congestion: Nasal Congestion, Allergies, hard to breath out of nose more often the left side, SNOT 22 total score 68         HISTORY OF PRESENT ILLNESS    Kerri was seen at the behest of Idania Herrera for nasal congestion.   Tried astelin nasal spray but did not tolerate due to bad taste.  Currently on immunotherapy for allergic rhinitis.          Sino-Nasal Outcome Test (SNOT - 22)    1. Need to Blow Nose: (P) Severe  2. Nasal Blockage: (P) Severe  3. Sneezing: (P) Mild or slight  4. Runny Nose: (P) Moderate  5. Cough: (P) None  6. Post-nasal discharge: (P) Mild or slight  7. Thick nasal discharge: (P) Severe  8. Ear fullness: (P) Moderate  9. Dizziness: (P) Mild or slight  10. Ear Pain: (P) None  11. Facial pain/pressure: (P) Mild or slight  12. Decreased Sense of Smell/Taste: (P) Mild or slight  13. Difficulty falling asleep: (P) Severe  14. Wake up at night: (P) Bad as it can be  15. Lack of a good night's sleep: (P) Bad as it can be  16. Wake up tired: (P) Severe  17. Fatigue: (P) Severe  18. Reduced Productivity: (P) Severe  19. Reduced Concentration: (P) Moderate  20. Frustrated/restless/irritable: (P) Moderate  21. Sad: (P) Severe  22. Embarrassed: (P) Severe    Total Score: (P) 68    COPYRIGHT 1996. [facility] [place] . [facility],[place]    Referral note:     History of present illness: This is a pleasant 27-year-old woman I was asked to see for evaluation by Siobhan Covington in regards to allergies.  Patient states that she had allergy symptoms her whole life.  She states symptoms worsen during the spring and fall but she does have symptoms throughout the year.  Symptoms consist of nasal congestion, runny nose, itchy eyes, drainage.  She states that she uses Claritin which helps.  Does not completely resolve the symptoms, however.  She tried Flonase regularly which did not improve symptoms.  She did note she had a reaction to tree nuts when she was 12 years of  age.  She had tongue swelling and itchy throat.  She never been tested for this and does not carry a current epinephrine device.  She states she has them on occasion and noticed itchy mouth and throat.  She did have 1 episode where she ate a lot of nuts and had throwing up.  No history of asthma.  No cough, wheeze or shortness of breath.  She does note itching with raw soy as well as raw apples.  She does note a lot of nasal congestion and wonders if she had nasal polyps.  She is never been seen by ENT.  She does note a slightly decreased sense of smell but she states she was a smoker and wonders if this affects her sense of smell.         REVIEW OF SYSTEMS    Review of Systems as per HPI and PMHx, otherwise 10 system review system are negative.       ALLERGIES    Nuts    CURRENT MEDICATIONS      Current Outpatient Medications:      ARIPiprazole (ABILIFY) 5 MG tablet, , Disp: , Rfl:      escitalopram (LEXAPRO) 5 MG tablet, Take 5 mg by mouth daily, Disp: , Rfl:      loratadine (CLARITIN) 10 MG tablet, Take 10 mg by mouth, Disp: , Rfl:      ORDER FOR ALLERGEN IMMUNOTHERAPY, M/DM/D/RW MM Mold Mix  1:10 w/v, 1.0 ml DFM Df Mite 10,000 AU, 0.5 ml DPM Dp Mite10,000 AU, 0.5 ml DOG AP Dog hair & dander, mixed breeds 1:10 w/v, 0.5 ml RW Ragweed, Short ambrosia artemisifolia 1:20 w/v, 1.0 ml  POLLENS/ CAT G GRASS MIX  100, 000 BAU 0.3 ml MUG Sagebrush, Mugwort,  1:20 w/v, 0.5 ml CAT Cat Hair 10,000 BAU 2.0 ml Trees BIR Birch Mix Paper, River/Red White Birch 1:20 w/v, 0.5 ml POP [place] common Populus deltoides 1:20 w/v, 0.5 ml ELM Elm, American Ulmus Americana 1:20 w/v, 0.5 ml MAP Maple, Hard/Sugar Acer saccharum 1:20 w/v, 0.5 ml OAK Oak Red Quercus Darlene 1:20 w/v, 0.5 ml RUSH Rush, White Fraxinus Americana 1:20 w/v, 0.5 ml MUL [place] Mix RW (Red, White) 1:20 w/v, 0.5 ml Dilutions: None (red) Frequency: weekly 1/10 (yellow) Frequency: weekly 1/100 (blue) Frequency: weekly 1/1000 (green) Frequency: weekly   Diluent: HSA qs  to 5ml, Disp: 5 mL, Rfl: 11     spironolactone (ALDACTONE) 100 MG tablet, Take 1 tablet (100 mg) by mouth daily, Disp: 90 tablet, Rfl: 3     azelastine (ASTELIN) 0.1 % nasal spray, Spray 2 sprays into both nostrils 2 times daily (Patient not taking: Reported on 2023), Disp: 30 mL, Rfl: 3     EPINEPHrine (ANY BX GENERIC EQUIV) 0.3 MG/0.3ML injection 2-pack, Inject into outer thigh for allergic reaction (Patient not taking: Reported on 2023), Disp: 4 each, Rfl: 0     Tirzepatide (MOUNJARO) 2.5 MG/0.5ML SOPN, Inject 2.5 mg Subcutaneous once a week (Patient not taking: Reported on 2023), Disp: 2 mL, Rfl: 0     PAST MEDICAL HISTORY    PAST MEDICAL HISTORY:   Past Medical History:   Diagnosis Date     Depressive disorder        PAST SURGICAL HISTORY    PAST SURGICAL HISTORY: No past surgical history on file.    FAMILY  HISTORY    FAMILY HISTORY:   Family History   Problem Relation Age of Onset     Depression Mother      Anxiety Disorder Father      Other Cancer Maternal Grandfather      Diabetes Paternal Grandmother      Anxiety Disorder Sister        SOCIAL HISTORY    SOCIAL HISTORY:   Social History     Tobacco Use     Smoking status: Former     Packs/day: 0.25     Years: 5.00     Pack years: 1.25     Types: Vaping Device, Cigarettes     Quit date: 2020     Years since quittin.7     Smokeless tobacco: Current   Substance Use Topics     Alcohol use: Yes     Alcohol/week: 12.0 standard drinks     Types: 12 Cans of beer per week        PHYSICAL EXAM    HEAD: Normal appearance and symmetry:  No cutaneous lesions.      NECK:  supple     EARS: myringosclerosis of Right TM (marginal)    EYES:  EOMI    CN VII/XII:  intact     NOSE:     Dorsum:   straight  Septum:  deviated off crest right  Mucosa:  cobbletonsing present  ITH: 3-4+ right; 2+ left        ORAL CAVITY/OROPHARYNX:     Lips:  Normal.  Tongue: normal, midline  Mucosa:   no lesions     NECK:  Trachea:  midline.              Thyroid:  normal               Adenopathy:  none        NEURO:   Alert and Oriented     GAIT AND STATION:  normal     RESPIRATORY:   Symmetry and Respiratory effort     PSYCH:  Normal mood and affect     SKIN:   warm and dry         IMPRESSION:    Encounter Diagnoses   Name Primary?     Nasal congestion      Deviated nasal septum Yes     Congestion of paranasal sinus           RECOMMENDATIONS:      Orders Placed This Encounter   Procedures     CT Sinus w/o Contrast      Orders Placed This Encounter   Medications     loratadine (CLARITIN) 10 MG tablet     Sig: Take 10 mg by mouth     cromolyn sodium (NASALCROM) 5.2 MG/ACT nasal aerosol     Si spray each nostril 1-3x daily as needed for nasal congestion     Dispense:  26 mL     Refill:  3     Return visit next available to review sinus CT and discuss septoplasty with turbinate reduction.

## 2023-01-23 NOTE — NURSING NOTE
Sino-Nasal Outcome Test (SNOT - 22)    1. Need to Blow Nose: (P) Severe  2. Nasal Blockage: (P) Severe  3. Sneezing: (P) Mild or slight  4. Runny Nose: (P) Moderate  5. Cough: (P) None  6. Post-nasal discharge: (P) Mild or slight  7. Thick nasal discharge: (P) Severe  8. Ear fullness: (P) Moderate  9. Dizziness: (P) Mild or slight  10. Ear Pain: (P) None  11. Facial pain/pressure: (P) Mild or slight  12. Decreased Sense of Smell/Taste: (P) Mild or slight  13. Difficulty falling asleep: (P) Severe  14. Wake up at night: (P) Bad as it can be  15. Lack of a good night's sleep: (P) Bad as it can be  16. Wake up tired: (P) Severe  17. Fatigue: (P) Severe  18. Reduced Productivity: (P) Severe  19. Reduced Concentration: (P) Moderate  20. Frustrated/restless/irritable: (P) Moderate  21. Sad: (P) Severe  22. Embarrassed: (P) Severe    Total Score: (P) 68    COPYRIGHT 1996. [facility] [place]    Aliyah Lovelace on 1/23/2023 at 9:45 AM

## 2023-01-23 NOTE — LETTER
1/23/2023         RE: Cindy Contreras  [address]  [address]  [place] [address]        Dear Colleague,    Thank you for referring your patient, Cindy Contreras, to the [facility]. Please see a copy of my visit note below.    CHIEF COMPLAINT: Patient presents with:  Nasal Congestion: Nasal Congestion, Allergies, hard to breath out of nose more often the left side, SNOT 22 total score 68         HISTORY OF PRESENT ILLNESS    Kerri was seen at the behest of Idania Herrera for nasal congestion.   Tried astelin nasal spray but did not tolerate due to bad taste.  Currently on immunotherapy for allergic rhinitis.          Sino-Nasal Outcome Test (SNOT - 22)    1. Need to Blow Nose: (P) Severe  2. Nasal Blockage: (P) Severe  3. Sneezing: (P) Mild or slight  4. Runny Nose: (P) Moderate  5. Cough: (P) None  6. Post-nasal discharge: (P) Mild or slight  7. Thick nasal discharge: (P) Severe  8. Ear fullness: (P) Moderate  9. Dizziness: (P) Mild or slight  10. Ear Pain: (P) None  11. Facial pain/pressure: (P) Mild or slight  12. Decreased Sense of Smell/Taste: (P) Mild or slight  13. Difficulty falling asleep: (P) Severe  14. Wake up at night: (P) Bad as it can be  15. Lack of a good night's sleep: (P) Bad as it can be  16. Wake up tired: (P) Severe  17. Fatigue: (P) Severe  18. Reduced Productivity: (P) Severe  19. Reduced Concentration: (P) Moderate  20. Frustrated/restless/irritable: (P) Moderate  21. Sad: (P) Severe  22. Embarrassed: (P) Severe    Total Score: (P) 68    COPYRIGHT 1996. [facility] [place]    Referral note:     History of present illness: This is a pleasant 27-year-old woman I was asked to see for evaluation by Siobhan Covington in regards to allergies.  Patient states that she had allergy symptoms her whole life.  She states symptoms worsen during the spring and fall but she does have symptoms throughout the year.  Symptoms consist of nasal  congestion, runny nose, itchy eyes, drainage.  She states that she uses Claritin which helps.  Does not completely resolve the symptoms, however.  She tried Flonase regularly which did not improve symptoms.  She did note she had a reaction to tree nuts when she was 12 years of age.  She had tongue swelling and itchy throat.  She never been tested for this and does not carry a current epinephrine device.  She states she has them on occasion and noticed itchy mouth and throat.  She did have 1 episode where she ate a lot of nuts and had throwing up.  No history of asthma.  No cough, wheeze or shortness of breath.  She does note itching with raw soy as well as raw apples.  She does note a lot of nasal congestion and wonders if she had nasal polyps.  She is never been seen by ENT.  She does note a slightly decreased sense of smell but she states she was a smoker and wonders if this affects her sense of smell.         REVIEW OF SYSTEMS    Review of Systems as per HPI and PMHx, otherwise 10 system review system are negative.       ALLERGIES    Nuts    CURRENT MEDICATIONS      Current Outpatient Medications:      ARIPiprazole (ABILIFY) 5 MG tablet, , Disp: , Rfl:      escitalopram (LEXAPRO) 5 MG tablet, Take 5 mg by mouth daily, Disp: , Rfl:      loratadine (CLARITIN) 10 MG tablet, Take 10 mg by mouth, Disp: , Rfl:      ORDER FOR ALLERGEN IMMUNOTHERAPY, M/DM/D/RW MM Mold Mix  1:10 w/v, 1.0 ml DFM Df Mite 10,000 AU, 0.5 ml DPM Dp Mite10,000 AU, 0.5 ml DOG AP Dog hair & dander, mixed breeds 1:10 w/v, 0.5 ml RW Ragweed, Short ambrosia artemisifolia 1:20 w/v, 1.0 ml  POLLENS/ CAT G GRASS MIX  100, 000 BAU 0.3 ml MUG Sagebrush, Mugwort,  1:20 w/v, 0.5 ml CAT Cat Hair 10,000 BAU 2.0 ml Trees BIR Birch Mix Paper, River/Red White Birch 1:20 w/v, 0.5 ml POP [place] common Populus deltoides 1:20 w/v, 0.5 ml ELM Elm, American Ulmus Americana 1:20 w/v, 0.5 ml MAP Maple, Hard/Sugar Acer saccharum 1:20 w/v, 0.5 ml OAK Oak Red Quercus  Darlene 1:20 w/v, 0.5 ml RUSH Rush, White Fraxinus Americana 1:20 w/v, 0.5 ml MUL [place] Mix RW (Red, White) 1:20 w/v, 0.5 ml Dilutions: None (red) Frequency: weekly 1/10 (yellow) Frequency: weekly  (blue) Frequency: weekly 1000 (green) Frequency: weekly   Diluent: HSA qs to 5ml, Disp: 5 mL, Rfl: 11     spironolactone (ALDACTONE) 100 MG tablet, Take 1 tablet (100 mg) by mouth daily, Disp: 90 tablet, Rfl: 3     azelastine (ASTELIN) 0.1 % nasal spray, Spray 2 sprays into both nostrils 2 times daily (Patient not taking: Reported on 2023), Disp: 30 mL, Rfl: 3     EPINEPHrine (ANY BX GENERIC EQUIV) 0.3 MG/0.3ML injection 2-pack, Inject into outer thigh for allergic reaction (Patient not taking: Reported on 2023), Disp: 4 each, Rfl: 0     Tirzepatide (MOUNJARO) 2.5 MG/0.5ML SOPN, Inject 2.5 mg Subcutaneous once a week (Patient not taking: Reported on 2023), Disp: 2 mL, Rfl: 0     PAST MEDICAL HISTORY    PAST MEDICAL HISTORY:   Past Medical History:   Diagnosis Date     Depressive disorder        PAST SURGICAL HISTORY    PAST SURGICAL HISTORY: No past surgical history on file.    FAMILY  HISTORY    FAMILY HISTORY:   Family History   Problem Relation Age of Onset     Depression Mother      Anxiety Disorder Father      Other Cancer Maternal Grandfather      Diabetes Paternal Grandmother      Anxiety Disorder Sister        SOCIAL HISTORY    SOCIAL HISTORY:   Social History     Tobacco Use     Smoking status: Former     Packs/day: 0.25     Years: 5.00     Pack years: 1.25     Types: Vaping Device, Cigarettes     Quit date: 2020     Years since quittin.7     Smokeless tobacco: Current   Substance Use Topics     Alcohol use: Yes     Alcohol/week: 12.0 standard drinks     Types: 12 Cans of beer per week        PHYSICAL EXAM    HEAD: Normal appearance and symmetry:  No cutaneous lesions.      NECK:  supple     EARS: myringosclerosis of Right TM (marginal)    EYES:  EOMI    CN VII/XII:  intact      NOSE:     Dorsum:   straight  Septum:  deviated off crest right  Mucosa:  cobbletonsing present  ITH: 3-4+ right; 2+ left        ORAL CAVITY/OROPHARYNX:     Lips:  Normal.  Tongue: normal, midline  Mucosa:   no lesions     NECK:  Trachea:  midline.              Thyroid:  normal              Adenopathy:  none        NEURO:   Alert and Oriented     GAIT AND STATION:  normal     RESPIRATORY:   Symmetry and Respiratory effort     PSYCH:  Normal mood and affect     SKIN:   warm and dry         IMPRESSION:    Encounter Diagnoses   Name Primary?     Nasal congestion      Deviated nasal septum Yes     Congestion of paranasal sinus           RECOMMENDATIONS:      Orders Placed This Encounter   Procedures     CT Sinus w/o Contrast      Orders Placed This Encounter   Medications     loratadine (CLARITIN) 10 MG tablet     Sig: Take 10 mg by mouth     cromolyn sodium (NASALCROM) 5.2 MG/ACT nasal aerosol     Si spray each nostril 1-3x daily as needed for nasal congestion     Dispense:  26 mL     Refill:  3     Return visit next available to review sinus CT and discuss septoplasty with turbinate reduction.         Again, thank you for allowing me to participate in the care of your patient.        Sincerely,        Reinier Glasgow MD

## 2023-01-27 ENCOUNTER — ALLIED HEALTH/NURSE VISIT (OUTPATIENT)
Dept: ALLERGY | Facility: CLINIC | Age: 28
End: 2023-01-27
Payer: COMMERCIAL

## 2023-01-27 DIAGNOSIS — J30.89 ALLERGIC RHINITIS DUE TO DUST MITE: ICD-10-CM

## 2023-01-27 DIAGNOSIS — J30.81 ALLERGIC RHINITIS DUE TO ANIMALS: ICD-10-CM

## 2023-01-27 DIAGNOSIS — J30.1 SEASONAL ALLERGIC RHINITIS DUE TO POLLEN: Primary | ICD-10-CM

## 2023-01-27 DIAGNOSIS — J30.89 ALLERGIC RHINITIS CAUSED BY MOLD: ICD-10-CM

## 2023-01-27 PROCEDURE — 95117 IMMUNOTHERAPY INJECTIONS: CPT

## 2023-01-27 NOTE — PROGRESS NOTES
Cindy Contreras presents to clinic today at the request of Idania Herrera MD (ordering provider) for Allergy Immunotherapy injection(s).       This service provided today was under the care of Idania Herrera MD; the supervising provider of the day; who was available if needed.      Patient presented after waiting 30 minutes with no reaction to  injections. Discharged from clinic.    Sheridan Kelly RN

## 2023-02-10 ENCOUNTER — ALLIED HEALTH/NURSE VISIT (OUTPATIENT)
Dept: ALLERGY | Facility: CLINIC | Age: 28
End: 2023-02-10
Payer: COMMERCIAL

## 2023-02-10 DIAGNOSIS — J30.89 ALLERGIC RHINITIS CAUSED BY MOLD: ICD-10-CM

## 2023-02-10 DIAGNOSIS — J30.81 ALLERGIC RHINITIS DUE TO ANIMALS: ICD-10-CM

## 2023-02-10 DIAGNOSIS — J30.1 SEASONAL ALLERGIC RHINITIS DUE TO POLLEN: Primary | ICD-10-CM

## 2023-02-10 DIAGNOSIS — J30.89 ALLERGIC RHINITIS DUE TO DUST MITE: ICD-10-CM

## 2023-02-10 PROCEDURE — 95117 IMMUNOTHERAPY INJECTIONS: CPT

## 2023-02-17 ENCOUNTER — DOCUMENTATION ONLY (OUTPATIENT)
Dept: ALLERGY | Facility: CLINIC | Age: 28
End: 2023-02-17
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
## 2023-02-21 ENCOUNTER — VIRTUAL VISIT (OUTPATIENT)
Dept: ENDOCRINOLOGY | Facility: CLINIC | Age: 28
End: 2023-02-21
Payer: COMMERCIAL

## 2023-02-21 VITALS — HEIGHT: 61 IN | WEIGHT: 175 LBS | BODY MASS INDEX: 33.04 KG/M2

## 2023-02-21 DIAGNOSIS — E66.811 CLASS 1 OBESITY WITHOUT SERIOUS COMORBIDITY WITH BODY MASS INDEX (BMI) OF 33.0 TO 33.9 IN ADULT, UNSPECIFIED OBESITY TYPE: Primary | ICD-10-CM

## 2023-02-21 PROCEDURE — 99205 OFFICE O/P NEW HI 60 MIN: CPT | Mod: VID

## 2023-02-21 RX ORDER — ARIPIPRAZOLE 2 MG/1
1 TABLET ORAL
COMMUNITY
Start: 2023-01-25 | End: 2023-06-20

## 2023-02-21 NOTE — NURSING NOTE
"(   Chief Complaint   Patient presents with     New Patient    )    ( Weight: 175 lb (pt reported) )  ( Height: 5' 1\" (pt reported) )  ( BMI (Calculated): 33.07 )  (   )  (   )  (   )  (   )  (   )  (   )    (   )  (   )  (   )  (   )  (   )  (   )  (   )    (   Patient Active Problem List   Diagnosis     Major Depression, Single Episode     Acne     Bipolar 2 disorder (H)     Environmental allergies     Healthy adolescent     Presence of IUD     Smoker    )  (   Current Outpatient Medications   Medication Sig Dispense Refill     ARIPiprazole (ABILIFY) 5 MG tablet Take 1 tablet by mouth       ARIPiprazole (ABILIFY) 5 MG tablet        cromolyn sodium (NASALCROM) 5.2 MG/ACT nasal aerosol 1 spray each nostril 1-3x daily as needed for nasal congestion 26 mL 3     escitalopram (LEXAPRO) 5 MG tablet Take 5 mg by mouth daily       loratadine (CLARITIN) 10 MG tablet Take 10 mg by mouth       loratadine 10 MG capsule        ORDER FOR ALLERGEN IMMUNOTHERAPY M/DM/D/RW  MM Mold Mix  1:10 w/v, 1.0 ml  DFM Df Mite 10,000 AU, 0.5 ml  DPM Dp Mite10,000 AU, 0.5 ml  DOG AP Dog hair & dander, mixed breeds 1:10 w/v, 0.5 ml  RW Ragweed, Short ambrosia artemisifolia 1:20 w/v, 1.0 ml   POLLENS/ CAT  G GRASS MIX  100, 000 BAU 0.3 ml  MUG Sagebrush, Mugwort,  1:20 w/v, 0.5 ml  CAT Cat Hair 10,000 BAU 2.0 ml  Trees  BIR Birch Mix Paper, River/Red White Birch 1:20 w/v, 0.5 ml  POP [place] common Populus deltoides 1:20 w/v, 0.5 ml  ELM Elm, American Ulmus Americana 1:20 w/v, 0.5 ml  MAP Maple, Hard/Sugar Acer saccharum 1:20 w/v, 0.5 ml  [place] Red Quercus Darlene 1:20 w/v, 0.5 ml  RUSH Rush, White Fraxinus Americana 1:20 w/v, 0.5 ml  MUL [place] Mix RW (Red, White) 1:20 w/v, 0.5 ml  Dilutions: None (red) Frequency: weekly  1/10 (yellow) Frequency: weekly  1/100 (blue) Frequency: weekly  1/1000 (green) Frequency: weekly      Diluent: HSA qs to 5ml 5 mL 11     spironolactone (ALDACTONE) 100 MG tablet Take 1 tablet by mouth       spironolactone " (ALDACTONE) 100 MG tablet Take 1 tablet (100 mg) by mouth daily 90 tablet 3    )  ( Diabetes Eval:    )    ( Pain Eval:  Data Unavailable )    ( Wound Eval:       )    (   History   Smoking Status     Former     Packs/day: 0.25     Years: 5.00     Types: Vaping Device, Cigarettes     Quit date: 5/1/2020   Smokeless Tobacco     Current    )    ( Signed By:  Heather Tucker; February 21, 2023; 10:34 AM )

## 2023-02-21 NOTE — PROGRESS NOTES
"Cindy Contreras is a 27 year old who is being evaluated via a billable video visit.      How would you like to obtain your AVS? MyChart  If the video visit is dropped, the invitation should be resent by: Text to cell phone: [phone]  Will anyone else be joining your video visit? No  If patient encounters technical issues they should call [phone]    During this virtual visit the patient is located in [place], patient verifies this as the location during the entirety of this visit.     Video-Visit Details  Video Start Time: {video visit start/end time for provider to select:319054}    Type of service:  Video Visit    Video End Time:{video visit start/end time for provider to select:152948}    Originating Location (pt. Location): {video visit patient location:558510::\"Home\"}  Distant Location (provider location):  [facility]  Platform used for Video Visit: {Virtual Visit Platforms:335965::\"Wireless Toyz\"}    Heather Tucker CMA  2/21/2023      10:34 AM    "
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
## 2023-02-22 NOTE — PATIENT INSTRUCTIONS
"Thank you for allowing us the privilege of caring for you. We hope we provided you with the excellent service you deserve.   Please let us know if there is anything else we can do for you so that we can be sure you are completely satisfied with your care experience.    To ensure the quality of our services you may be receiving a patient satisfaction survey from an independent patient satisfaction monitoring company.    The greatest compliment you can give is a \"Likely to Recommend\"    Your visit was with TOMER BAILEY PA-C today.    Instructions per today's visit:     Franklin Contreras, it was great to visit with you today.  Here is a review of our visit.  If our clinic scheduler is not able to reach you please call [phone] to schedule your next appointments.    1. Discuss with Psychiatrist about starting topiramate or Phentermine   2. Lab have been ordered. Please make sure these are fasting labs.  Please make an appointment to have them drawn at your convenience.   To schedule the Lab Appointment using [employer]:  Select \"Schedule an Appointment\"  Select \"Lab Only\"  For \"A couple of questions\", select \"Other\"  For \"Which locations work for you?, select the location and set up the appointment  To schedule by phone call [phone] to schedule a lab only appointment at any [facility] lab.  3. Will consider starting Ozempic based on labs   4. Follow up with Tomer Figueroa after 5-6 weeks after starting medication      Information about Video Visits with [facility]: video visit information  _________________________________________________________________________________________________________________________________________________________  If you are asked by your clinic team to have your blood pressure checked:  [facility] do offer several locations for blood pressure checks. Please follow the below link to schedule an appointment. Scheduling an appointment at the pharmacy for a blood pressure " check is now preferred.    Appointment Plus ([email or web address])  _________________________________________________________________________________________________________________________________________________________  Important contact and scheduling information:  Please call our contact center at [phone] to schedule your next appointments.  To find a lab location near you, please call [phone].  For any nursing questions or concerns call Lizett Fernandez LPN at [phone] or Agatha Cardenas RN at [phone]  Please call during clinic hours Monday through Friday 8:00a - 4:00p if you have questions or you can contact us via [employer]hart at anytime and we will reply during clinic hours.    Lab results will be communicated through My Chart or letter (if My Chart not used). Please call the clinic if you have not received communication after 1 week or if you have any questions.?  Clinic Fax: [phone]    _________________________________________________________________________________________________________________________________________________________  Meal Replacement Products:    Here is the link to our new e-store where you can purchase our meal replacement products    [facility] E-Store  [employer] / [email or web address]    The one week starter kit is a great way to sample a variety of products and see what works for you.    If you want more information about the product go to: [employer]    If you are an employee or [facility] or [facility] please contact your care team for a 10% estore discount    Free Shipping for orders over $75     Benefits of meal replacements products:    Portion and calorie control  Improved nutrition  Structured eating  Simplified food choices  Avoid contact with trigger  foods  _________________________________________________________________________________________________________________________________________________________  Interested in working with a health ?  Health coaches work with you to improve your overall health and wellbeing.  They look at the whole person, and may involve discussion of different areas of life, including, but not limited to the four pillars of health (sleep, exercise, nutrition, and stress management). Discuss with your care team if you would like to start working a health .  Health Coaching-3 Pack: Schedule by calling [phone]    $99 for three health coaching visits    Visits may be done in person or via phone    Coaching is a partnership between the  and the client; Coaches do not prescribe or diagnose    Coaching helps inspire the client to reach his/her personal goals   _________________________________________________________________________________________________________________________________________________________  24 Week Healthy Lifestyle Plan:    Our mission in the 24-week Healthy Lifestyle Plan is to provide you with individualized care by giving you the tools, education and support you need to lose weight and maintain a healthy lifestyle. In your 24-week journey, you ll be supported by a dedicated weight loss team that includes registered dietitians, medical weight management providers, health coaches, and nurses -- all with special expertise in weight loss -- to help you every step of the way.     Monthly meetings with your registered dietician or medical weight management provider help to review your progress, update your care plan, and make any adjustments needed to ensure success. Between these visits, weekly and bi-weekly health  visits will help you focus on the four pillars of weight loss -- stress, sleep, nutrition, and exercise -- and how you can best adapt each to achieve sustainable weight loss  results.    In addition, you will be given exclusive access to online wellbeing classes through [employer].  Your initial visit will be with a medical weight management provider who will help to understand your weight loss goals and ensure this program is the right fit for you. Please let our team know if you are interested in the 24 week plan by sending a message to your care team or calling [phone] to schedule.  _________________________________________________________________________________________________________________________________________________________    COMPREHENSIVE WEIGHT MANAGEMENT PROGRAM  VIRTUAL SUPPORT GROUPS    For Support Group Information:      We offer support groups for patients who are working on weight loss and considering, preparing for or have had weight loss surgery.   There is no cost for this opportunity.  You are invited to attend the?Virtual Support Groups?provided by any of the following locations:    [facility] via [employer] Teams with Jackie Luo RN  2.   [place] via [employer] Teams with Garry Coon, PhD, LP  3.   [place] via [employer] Teams with Geetha Maria RN  4.   [facility] via [employer] Teams with Geetha Newberry [facility]    The following Support Group information can also be found on our website:  [email or web address]    [facility] Weight Loss Surgery Support Group    [facility] Weight Loss Surgery Support Group  The support group is a patient-lead forum that meets monthly to share experiences, encouragement and education. It is open to those who have had weight loss surgery, are scheduled for surgery, and those who are considering surgery.   WHEN: This group meets on the 3rd Wednesday of each month from 5:00PM - 6:00PM virtually using Microsoft Teams.   FACILITATOR: Led by Jackie Luo RD, LD, RN, the program's Clinical Coordinator.   TO REGISTER: Please contact the  "clinic via [employer] or call the nurse line directly at [phone] to inform our staff that you would like an invite sent to you and to let us know the email you would like the invite sent to. Prior to the meeting, a link with directions on how to join the meeting will be sent to you.    2022 Meetings  Ml 15: \"Let's Talk\" a time for the group to share.  July 20: \"Let's Talk\" a time for the group to share.  August 17: \"Let's Talk\" a time for the group to share.  September 21: \"Let's Talk\" a time for the group to share.  October 19: Guest Speaker: Dr Warren Alvarez MD Pulmonologist and Sleep Medicine Physician, \"Getting a Good Night's Sleep\".  November 16: \"Let's Talk\" a time for the group to share.  December 21: \"Let's Talk\" a time for the group to share.    [facility] Support Groups    Connections: Bariatric Care Support Group?  This is open to all [facility] (and those external to this program) pre- and post- operative bariatric surgery patients as well as their support system.   WHEN: This group meets the 2nd Tuesday of each month from 6:30 PM - 8:00 PM virtually using Microsoft Teams.   FACILITATOR: Led by Garry Coon, Ph.D who is a Licensed Psychologist with the [facility] Comprehensive Weight Management Program.   TO REGISTER: Please send an email to Garry Coon, Ph.D., LP at?[email or web address]?if you would like an invitation to the group and to learn about using Microsoft Teams.    2022 Meetings  June 14: Margaret Cortez, ADILIA, LD at [facility], \"Nutritional Labeling\"  July 12 August 2 (Please Note Date Change)  September 13 October 11 November 8 December 13    Connections: Post-Operative Bariatric Surgery Support Group  This is a support group for [facility] bariatric patients (and those external to [facility]) who have had bariatric surgery and are at least 3 months post-surgery.  WHEN: This support group meets the " "4th Wednesday of the month from 11:00 AM - 12:00 PM virtually using Microsoft Teams.   FACILITATOR: Led by Certified Bariatric Nurse, Geetha Maria RN.   TO REGISTER: Please send an email to Geetha at [email or web address] if you would like an invitation to the group and to learn about using Microsoft Teams.    2022 Meetings June 22 July 27 August 24 September 28 October 26 November 23 December 28      [facility] Healthy Lifestyle Virtual Support Group    Healthy Lifestyle Virtual Support Group?  This is 60 minutes of small group guided discussion, support and resources. All are welcome who want a healthy lifestyle.  WHEN: This group meets monthly on a Friday from 12:30 PM - 1:30 PM virtually using Microsoft Teams.   FACILITATOR: Led by National Board Certified Health and , Geetha Newberry [facility].   TO REGISTER: Please send an email to Geetha [email or web address] to receive monthly invites to the group or if you have any questions about having a health .  Prior to the meeting, a link with directions on how to join the meeting will be sent to you.    2022 Meetings  June 24: Geetha Newberry [facility], \"Setting Limits and Boundaries\".  Jul 29: Open Forum  August 26: Guest Speaker: Melisa Khan Registered Dietitian  September 30: Open Forum  October 28th: Guest Speaker: Bisi Cain [facility], Health , \"Gratitude Practices\".  November 18: Guest Speaker: Carli Gordillo RD Registered Dietitian, \"Navigating How to Eat around the Holidays\".  December 16: Guest Speaker: Princess Davies [facility], \"Changing Your Relationship with Movement\".    ____________________________________________________________________________________________________________________________________________________________________________  [facility] Get Moving Program  Our team of physical therapists is trained to help you understand and take control of your " condition. They will perform a thorough evaluation to determine your ability for activity and develop a customized plan to fit your goals and physical ability.  Scheduling: Unsure if the Get Moving program is right for you? Discuss the program with your medical provider or diabetes educator. You can also call us at [phone] to ask questions or schedule an appointment.   [facility]ONATH[facility] Get Moving Program  ____________________________________________________________________________________________________________________________________________________________________________  [facility] Diabetes Prevention Program (DPP)  If you have prediabetes and Medicare please contact us via [employer] to learn more about the Diabetes Prevention Program (DPP)  Program Details:  [facility] offers the year-long Diabetes Prevention Program (DPP). The program helps you to make lifestyle changes that prevent or delay type 2 diabetes by supporting healthy eating, increased physical activity, stress reduction and use of coping skills.   On average, previous [facility] DPP cohorts have lost and maintained at least 5% of their starting weight throughout the program and averaged more than 150 minutes of physical activity per week.  Participants meet weekly for one-hour group sessions over sixteen weeks, every other week for the next 8 weeks, and monthly for the last six months.   A year-long maintenance program is also available for participants who complete the first year.   Location & Cost:   During the COVID-19 Public Health Emergency, the program is offered virtually. When in-person classes can resume, they will be held at [facility].  For people with Medicare, the program is covered in full. A self-pay option will also be available for those with non-Medicare insurance plans.    _________________________________________________________________________________________________________________________________________________________  Bluetooth Scale:    We hope to provide you with high quality virtual healthcare visits while social distancing for COVID-19 is necessary, as well as in the future when virtual visits may be more convenient for you.     Our technology team made it possible for Bluetooth scales to send weight measurements to our electronic medical record. This allows weights from you weighing at home to securely flow into the medical record, which will improve telephone and virtual visits.   Additionally, studies have shown that adults actually lose more weight when their weights are automatically sent to someone else, and also that this process is not stressful for those adults.    Below is a link for purchasing the scale, with a discount code for our patients. You may call your insurance company to see if they will reimburse you for the cost of the scale, as a piece of durable medical equipment. The scales only go up to a weight of 400 pounds. This is an issue and we are working with the developer on increasing this. We found no scales that go over 400lb that have blue-tooth for connecting to [employer].    Scale to purchase: the [employer]  Body  Scale: https://[email or web address]/en/body/[email or web address]=aw.ds    Discount Code: We have a discount code for our patients to bring the cost down to $50, Discount code is: [place]_Scale_20%off  _______________________________________________________________________________________________________________________________________________________________________________    To work with a Behavioral Health Psychologist:    Call to schedule:    Deo Helton - [phone]  Karen Alba - [phone]  Arabella Valdovinos - [phone]  Hanna Martinez - [phone]   Ida Richardson PhD  (cannot accept Medicare) [phone]        Thank you,   [facility] Comprehensive Weight Management Team

## 2023-02-22 NOTE — ASSESSMENT & PLAN NOTE
Obesity onset in the past 2 years. Has gained around 50lbs without any lifestyle changes. Has improved diet, increase activity, and decreased ETOH. Has started on lexaprol and abilify in the past 6 months, but denies any change in weight gain. Previously was weight stable around 120lbs. Is very anxious about the weight gain, and feels that no matter what she tries, that she continues to gain weight. Is finally been weight stable around the past 3 months at 173lbs. Has not been able to lose weight.     Is having constant intrusive thoughts about food. Some increased hunger. Can get full, but stuggles to stay full.     Discussed AOMs today:  - Phentermine - History of bipolar II. To be discussed with psychiatrist prior to starting.   - Topiramate - to be discussed with psychiatrist prior to starting. Discussed risks, side effects, and benefits. Is not on birth control, but is not sexually active. Discussed risk of birth defects. If started, she will monitor for any changes in mood.   -  Naltrexone - can be considered in the future. No liver diesease and not on narcotics.   - GLP-1 - she was hoping to start a GLP-1 today. However, Wegovy and Mounjaro were denied by her insurance. Will check labs for possible Type II Diabetes, pre-diabetes and insulin resistance.

## 2023-02-27 DIAGNOSIS — E66.811 CLASS 1 OBESITY WITHOUT SERIOUS COMORBIDITY WITH BODY MASS INDEX (BMI) OF 33.0 TO 33.9 IN ADULT, UNSPECIFIED OBESITY TYPE: Primary | ICD-10-CM

## 2023-02-27 RX ORDER — TOPIRAMATE 25 MG/1
TABLET, FILM COATED ORAL
Qty: 90 TABLET | Refills: 1 | Status: SHIPPED | OUTPATIENT
Start: 2023-02-27 | End: 2023-06-14

## 2023-03-03 ENCOUNTER — ALLIED HEALTH/NURSE VISIT (OUTPATIENT)
Dept: ALLERGY | Facility: CLINIC | Age: 28
End: 2023-03-03
Payer: COMMERCIAL

## 2023-03-03 DIAGNOSIS — J30.89 ALLERGIC RHINITIS CAUSED BY MOLD: ICD-10-CM

## 2023-03-03 DIAGNOSIS — J30.89 ALLERGIC RHINITIS DUE TO DUST MITE: ICD-10-CM

## 2023-03-03 DIAGNOSIS — J30.1 SEASONAL ALLERGIC RHINITIS DUE TO POLLEN: Primary | ICD-10-CM

## 2023-03-03 DIAGNOSIS — J30.81 ALLERGIC RHINITIS DUE TO ANIMALS: ICD-10-CM

## 2023-03-03 PROCEDURE — 95117 IMMUNOTHERAPY INJECTIONS: CPT

## 2023-03-06 ENCOUNTER — LAB (OUTPATIENT)
Dept: LAB | Facility: CLINIC | Age: 28
End: 2023-03-06
Payer: COMMERCIAL

## 2023-03-06 DIAGNOSIS — E66.811 CLASS 1 OBESITY WITHOUT SERIOUS COMORBIDITY WITH BODY MASS INDEX (BMI) OF 33.0 TO 33.9 IN ADULT, UNSPECIFIED OBESITY TYPE: ICD-10-CM

## 2023-03-06 LAB
ALBUMIN SERPL BCG-MCNC: 4.4 G/DL (ref 3.5–5.2)
ALP SERPL-CCNC: 99 U/L (ref 35–104)
ALT SERPL W P-5'-P-CCNC: 43 U/L (ref 10–35)
ANION GAP SERPL CALCULATED.3IONS-SCNC: 12 MMOL/L (ref 7–15)
AST SERPL W P-5'-P-CCNC: 30 U/L (ref 10–35)
BILIRUB SERPL-MCNC: 0.4 MG/DL
BUN SERPL-MCNC: 10.2 MG/DL (ref 6–20)
CALCIUM SERPL-MCNC: 9.4 MG/DL (ref 8.6–10)
CHLORIDE SERPL-SCNC: 104 MMOL/L (ref 98–107)
CHOLEST SERPL-MCNC: 208 MG/DL
CREAT SERPL-MCNC: 0.77 MG/DL (ref 0.51–0.95)
DEPRECATED HCO3 PLAS-SCNC: 22 MMOL/L (ref 22–29)
ERYTHROCYTE [DISTWIDTH] IN BLOOD BY AUTOMATED COUNT: 12.9 % (ref 10–15)
GFR SERPL CREATININE-BSD FRML MDRD: >90 ML/MIN/1.73M2
GLUCOSE SERPL-MCNC: 99 MG/DL (ref 70–99)
HBA1C MFR BLD: 5.6 % (ref 0–5.6)
HCT VFR BLD AUTO: 42.4 % (ref 35–47)
HDLC SERPL-MCNC: 53 MG/DL
HGB BLD-MCNC: 14 G/DL (ref 11.7–15.7)
INSULIN SERPL-ACNC: 23.9 UU/ML (ref 2.6–24.9)
LDLC SERPL CALC-MCNC: 141 MG/DL
MCH RBC QN AUTO: 29.7 PG (ref 26.5–33)
MCHC RBC AUTO-ENTMCNC: 33 G/DL (ref 31.5–36.5)
MCV RBC AUTO: 90 FL (ref 78–100)
NONHDLC SERPL-MCNC: 155 MG/DL
PLATELET # BLD AUTO: 263 10E3/UL (ref 150–450)
POTASSIUM SERPL-SCNC: 3.9 MMOL/L (ref 3.4–5.3)
PROT SERPL-MCNC: 7 G/DL (ref 6.4–8.3)
PTH-INTACT SERPL-MCNC: 33 PG/ML (ref 15–65)
RBC # BLD AUTO: 4.71 10E6/UL (ref 3.8–5.2)
SODIUM SERPL-SCNC: 138 MMOL/L (ref 136–145)
TRIGL SERPL-MCNC: 68 MG/DL
TSH SERPL DL<=0.005 MIU/L-ACNC: 1.38 UIU/ML (ref 0.3–4.2)
WBC # BLD AUTO: 8.3 10E3/UL (ref 4–11)

## 2023-03-06 PROCEDURE — 85027 COMPLETE CBC AUTOMATED: CPT

## 2023-03-06 PROCEDURE — 83036 HEMOGLOBIN GLYCOSYLATED A1C: CPT

## 2023-03-06 PROCEDURE — 36415 COLL VENOUS BLD VENIPUNCTURE: CPT

## 2023-03-06 PROCEDURE — 83525 ASSAY OF INSULIN: CPT

## 2023-03-06 PROCEDURE — 83970 ASSAY OF PARATHORMONE: CPT

## 2023-03-06 PROCEDURE — 80061 LIPID PANEL: CPT

## 2023-03-06 PROCEDURE — 80053 COMPREHEN METABOLIC PANEL: CPT

## 2023-03-06 PROCEDURE — 82306 VITAMIN D 25 HYDROXY: CPT

## 2023-03-06 PROCEDURE — 84443 ASSAY THYROID STIM HORMONE: CPT

## 2023-03-07 LAB — DEPRECATED CALCIDIOL+CALCIFEROL SERPL-MC: 18 UG/L (ref 20–75)

## 2023-03-08 ENCOUNTER — TELEPHONE (OUTPATIENT)
Dept: ENDOCRINOLOGY | Facility: CLINIC | Age: 28
End: 2023-03-08
Payer: COMMERCIAL

## 2023-03-08 ENCOUNTER — TELEPHONE (OUTPATIENT)
Dept: ENDOCRINOLOGY | Facility: CLINIC | Age: 28
End: 2023-03-08

## 2023-03-08 DIAGNOSIS — E88.819 INSULIN RESISTANCE: Primary | ICD-10-CM

## 2023-03-08 DIAGNOSIS — E66.811 CLASS 1 OBESITY WITHOUT SERIOUS COMORBIDITY WITH BODY MASS INDEX (BMI) OF 33.0 TO 33.9 IN ADULT, UNSPECIFIED OBESITY TYPE: ICD-10-CM

## 2023-03-08 NOTE — TELEPHONE ENCOUNTER
Medication Question or Refill    Contacts       Type Contact Phone/Fax    03/08/2023 01:47 PM CST Phone (Incoming) Kerri Contreras (Self) [phone] (M)          What medication are you calling about (include dose and sig)?:     semaglutide (OZEMPIC) 2 MG/1.5ML SOPN pen    Preferred Pharmacy:  [id number] - [place], [place] - [address] [place] [address]  Phone: [phone] Fax: [phone]      Controlled Substance Agreement on file:   CSA -- Patient Level:    CSA: None found at the patient level.       Who prescribed the medication?: Wili    Do you have any questions or concerns?  Yes:  Called asking for status and wondering if we've started a Prior Auth for it. Told her it's not the far along yet. Please update her...      Could we send this information to you in [employer] [place] or would you prefer to receive a phone call?:   Patient would prefer a phone call   Okay to leave a detailed message?: Yes at Cell number on file:    Telephone Information:   Mobile [phone]

## 2023-03-12 NOTE — TELEPHONE ENCOUNTER
PRIOR AUTHORIZATION DENIED    Medication: semaglutide (OZEMPIC) 2 MG/1.5ML SOPN pen--DENIED    Denial Date: 3/12/2023    Denial Rational: Patient is not using to treat type 2 diabetes.     Appeal Information:

## 2023-03-12 NOTE — TELEPHONE ENCOUNTER
PA Initiation    Medication: semaglutide (OZEMPIC) 2 MG/1.5ML SOPN pen   Insurance Company: [employer] ([facility]) - Phone [phone] Fax [phone]  Pharmacy Filling the Rx: [id number] - [place], [place] - [address] [place] [address]  Filling Pharmacy Phone: [phone]  Filling Pharmacy Fax: [phone]  Start Date: 3/11/2023

## 2023-03-13 ENCOUNTER — OFFICE VISIT (OUTPATIENT)
Dept: ALLERGY | Facility: CLINIC | Age: 28
End: 2023-03-13
Payer: COMMERCIAL

## 2023-03-13 VITALS — WEIGHT: 175 LBS | BODY MASS INDEX: 33.04 KG/M2 | RESPIRATION RATE: 16 BRPM | HEIGHT: 61 IN

## 2023-03-13 DIAGNOSIS — R06.02 SHORTNESS OF BREATH: ICD-10-CM

## 2023-03-13 DIAGNOSIS — J30.1 SEASONAL ALLERGIC RHINITIS DUE TO POLLEN: Primary | ICD-10-CM

## 2023-03-13 PROCEDURE — 99214 OFFICE O/P EST MOD 30 MIN: CPT | Performed by: ALLERGY & IMMUNOLOGY

## 2023-03-13 RX ORDER — ALBUTEROL SULFATE 90 UG/1
2 AEROSOL, METERED RESPIRATORY (INHALATION) EVERY 6 HOURS PRN
Qty: 18 G | Refills: 0 | Status: SHIPPED | OUTPATIENT
Start: 2023-03-13 | End: 2023-07-25

## 2023-03-13 NOTE — PATIENT INSTRUCTIONS
Albuterol 2 puffs every 4 hours as needed--shortness of breath, cough, wheeze    Aleve (naproxen) 1-2 times per day for 1 week    Continue the shots    Every 2 weeks

## 2023-03-13 NOTE — LETTER
"    3/13/2023         RE: Cindy Contreras  [address] [address]        Dear Colleague,    Thank you for referring your patient, Cindy Contreras, to the [facility]. Please see a copy of my visit note below.          Og Manning is a 27 year old, presenting for the following health issues:  RECHECK and Allergy Injection (No shot given no epi)      HPI     Chief complaint: Follow-up allergies    History of present illness: This is a pleasant 27-year-old woman here today for follow-up of allergies.  She wanted her blue vial and received 0.2 mL of the 1: 100 blue vial at her last visit.  She forgot her epinephrine device today.  For this reason, she did not receive an allergy shot.  She said no systemic reactions to allergy shots.  No site reactions.  She reports last night she had an allergy attack where she felt sneezing, watery eyes and shortness of breath that lasted about an hour.  She took an additional Claritin.  She takes Claritin daily.  She does not have a history of asthma and does not have an albuterol inhaler at home.  She moved into a new home and wonders if this is causing some difficulty.  She has missed several allergy shot appointments and states this is secondary to work.       Objective    Resp 16   Ht 1.549 m (5' 1\")   Wt 79.4 kg (175 lb)   BMI 33.07 kg/m    Body mass index is 33.07 kg/m .  Physical Exam   Gen: Pleasant female not in acute distress  HEENT: Eyes no erythema of the bulbar or palpebral conjunctiva, no edema.  Nose: No congestion, mucosa normal. Mouth: Throat clear, no lip or tongue edema.     Respiratory: Clear to auscultation bilaterally, no adventitious breath sounds    Skin: No rashes or lesions  Psych: Alert and oriented times 3    Impression report and plan:  1.  Allergic rhinitis  2.  Shortness of breath    He did prescribe an albuterol inhaler.  If using greater than 2 times requested exercise, she should " notify.  Reviewed technique.  Would recommend Zyrtec or Claritin to see if this helps with her current allergy symptoms.  She is having trouble coming in for her allergy shots due to her work.  Recommended every 2-week administration.  Notify of systemic reaction.  Continue allergy shots.                     Again, thank you for allowing me to participate in the care of your patient.        Sincerely,        Idania NEELY MD

## 2023-03-13 NOTE — PROGRESS NOTES
"      Og Manning is a 27 year old, presenting for the following health issues:  RECHECK and Allergy Injection (No shot given no epi)      HPI     Chief complaint: Follow-up allergies    History of present illness: This is a pleasant 27-year-old woman here today for follow-up of allergies.  She wanted her blue vial and received 0.2 mL of the 1: 100 blue vial at her last visit.  She forgot her epinephrine device today.  For this reason, she did not receive an allergy shot.  She said no systemic reactions to allergy shots.  No site reactions.  She reports last night she had an allergy attack where she felt sneezing, watery eyes and shortness of breath that lasted about an hour.  She took an additional Claritin.  She takes Claritin daily.  She does not have a history of asthma and does not have an albuterol inhaler at home.  She moved into a new home and wonders if this is causing some difficulty.  She has missed several allergy shot appointments and states this is secondary to work.        Objective    Resp 16   Ht 1.549 m (5' 1\")   Wt 79.4 kg (175 lb)   BMI 33.07 kg/m    Body mass index is 33.07 kg/m .  Physical Exam   Gen: Pleasant female not in acute distress  HEENT: Eyes no erythema of the bulbar or palpebral conjunctiva, no edema.  Nose: No congestion, mucosa normal. Mouth: Throat clear, no lip or tongue edema.     Respiratory: Clear to auscultation bilaterally, no adventitious breath sounds    Skin: No rashes or lesions  Psych: Alert and oriented times 3    Impression report and plan:  1.  Allergic rhinitis  2.  Shortness of breath    He did prescribe an albuterol inhaler.  If using greater than 2 times requested exercise, she should notify.  Reviewed technique.  Would recommend Zyrtec or Claritin to see if this helps with her current allergy symptoms.  She is having trouble coming in for her allergy shots due to her work.  Recommended every 2-week administration.  Notify of systemic reaction.  Continue " allergy shots.

## 2023-03-13 NOTE — TELEPHONE ENCOUNTER
Ozempic denied due to diagnosis. Only covered for type 2 diabetes. Please advise on how you would like to proceed. If appealed please provide medical rational.

## 2023-03-21 NOTE — PROGRESS NOTES
Kerri is a 27 year old who is being evaluated via a billable video visit.      How would you like to obtain your AVS? MyChart  If the video visit is dropped, the invitation should be resent by: Text to cell phone: [phone]  Will anyone else be joining your video visit? No            ICD-10-CM    1. Obesity without serious comorbidity, unspecified classification, unspecified obesity type  E66.9       2. Moderate episode of recurrent major depressive disorder (H)  F33.1       3. Bipolar 2 disorder (H)  F31.81       4. Acne vulgaris  L70.0         I encouraged her to see the weight loss clinic.  Recommend labs including A1c, TSH, T4 free.  We discussed healthy lifestyle changes and I encouraged strength training.  Patient continues to see psychiatry for depression and bipolar.  She is taking Abilify and escitalopram.  She continues spironolactone for acne.  She is content with the plan.    Subjective   Kerri is a 27 year old presenting for the following health issues:  No chief complaint on file.  Patient presents with concerns of obesity.  Patient has tried numerous diet and exercise programs in the past.  I tried to prescribe Mounjaro and Wegovy in which her insurance would not cover this.  Patient is interested in other treatment options to assist with weight loss.  She has an appointment with her weight loss clinic in 1 month.  Patient taking Abilify and escitalopram for bipolar disorder and depression.  She is seeing psychiatry.  She is prescribed spironolactone for acne.      Review of Systems   Constitutional, HEENT, cardiovascular, pulmonary, gi and gu systems are negative, except as otherwise noted.      Objective           Vitals:  No vitals were obtained today due to virtual visit.    Physical Exam   Patient is alert and speaking clearly.  She does not sound short of breath have a cough.          Video-Visit Details    Type of service:  Telephone visit  We could not get the video visit to work.    Phone  call duration: 8 minutes     Originating Location (pt. Location): Home    Distant Location (provider location):  On-site      Answers for HPI/ROS submitted by the patient on 1/23/2023  If you checked off any problems, how difficult have these problems made it for you to do your work, take care of things at home, or get along with other people?: Somewhat difficult  PHQ9 TOTAL SCORE: 11  What is the reason for your visit today? : .  How many servings of fruits and vegetables do you eat daily?: 2-3  On average, how many sweetened beverages do you drink each day (Examples: soda, juice, sweet tea, etc.  Do NOT count diet or artificially sweetened beverages)?: 1  How many minutes a day do you exercise enough to make your heart beat faster?: 20 to 29  How many days a week do you exercise enough to make your heart beat faster?: 3 or less  How many days per week do you miss taking your medication?: 1  What makes it hard for you to take your medication every day?: remembering to take       no

## 2023-04-03 ENCOUNTER — ALLIED HEALTH/NURSE VISIT (OUTPATIENT)
Dept: ALLERGY | Facility: CLINIC | Age: 28
End: 2023-04-03
Payer: COMMERCIAL

## 2023-04-03 DIAGNOSIS — J30.1 SEASONAL ALLERGIC RHINITIS DUE TO POLLEN: Primary | ICD-10-CM

## 2023-04-03 PROCEDURE — 95117 IMMUNOTHERAPY INJECTIONS: CPT

## 2023-06-14 ENCOUNTER — VIRTUAL VISIT (OUTPATIENT)
Dept: ENDOCRINOLOGY | Facility: CLINIC | Age: 28
End: 2023-06-14
Payer: COMMERCIAL

## 2023-06-14 VITALS — HEIGHT: 61 IN | BODY MASS INDEX: 27 KG/M2 | WEIGHT: 143 LBS

## 2023-06-14 DIAGNOSIS — E55.9 VITAMIN D DEFICIENCY: ICD-10-CM

## 2023-06-14 DIAGNOSIS — F31.81 BIPOLAR 2 DISORDER (H): Primary | ICD-10-CM

## 2023-06-14 DIAGNOSIS — E66.811 CLASS 1 OBESITY WITHOUT SERIOUS COMORBIDITY WITH BODY MASS INDEX (BMI) OF 33.0 TO 33.9 IN ADULT, UNSPECIFIED OBESITY TYPE: ICD-10-CM

## 2023-06-14 PROCEDURE — 99213 OFFICE O/P EST LOW 20 MIN: CPT | Mod: VID

## 2023-06-14 RX ORDER — TOPIRAMATE 25 MG/1
TABLET, FILM COATED ORAL
Qty: 90 TABLET | Refills: 2 | Status: SHIPPED | OUTPATIENT
Start: 2023-06-14 | End: 2023-08-23

## 2023-06-14 ASSESSMENT — PAIN SCALES - GENERAL: PAINLEVEL: NO PAIN (0)

## 2023-06-14 NOTE — NURSING NOTE
Is the patient currently in the state of [place]? YES    Visit mode:VIDEO    If the visit is dropped, the patient can be reconnected by: VIDEO VISIT: Text to cell phone: [phone]    Will anyone else be joining the visit? NO      How would you like to obtain your AVS? MyChart    Are changes needed to the allergy or medication list? NO    Reason for visit: RECHECK      Pt states insurance did not approve Ozempic.       Chey Payne, VF

## 2023-06-14 NOTE — LETTER
2023       RE: Cindy Contreras  [address]  [facility] [place] [address]     Dear Colleague,    Thank you for referring your patient, Cindy Contreras, to the [facility]. Please see a copy of my visit note below.    Kerri is a 27 year old who is being evaluated via a billable video visit.      How would you like to obtain your AVS? MyChart  If the video visit is dropped, the invitation should be resent by: Text to cell phone: [phone]  Will anyone else be joining your video visit? No        Video-Visit Details    Type of service:  Video Visit     Originating Location (pt. Location): Home    Distant Location (provider location):  Off-site  Platform used for Video Visit: [facility] Medical Weight Management Note     Cindy Contreras  MRN:  [id number]  :  1995  ROBERTO:  2023    Dear ABILIO Lima CNP,    I had the pleasure of seeing your patient Cindy Contreras. She is a 27 year old female who I am continuing to see for treatment of obesity related to:        2023     1:56 PM   --   I have the following health issues associated with obesity None of the above   I have the following symptoms associated with obesity Depression    Back Pain    Fatigue       Assessment & Plan   Problem List Items Addressed This Visit          Digestive    Class 1 obesity without serious comorbidity with body mass index (BMI) of 33.0 to 33.9 in adult    Relevant Medications    topiramate (TOPAMAX) 25 MG tablet       Behavioral    Bipolar 2 disorder (H) - Primary    Relevant Orders    Adult Mental Health  Referral     Other Visit Diagnoses       Vitamin D deficiency        Relevant Medications    Cholecalciferol (VITAMIN D-3) 125 MCG (5000 UT) TABS    Other Relevant Orders    Vitamin D Deficiency    Parathyroid Hormone Intact           Continue reramp of Topiramate 25mg - 2 tablets at night  for 7 days, then increase 3 tablets at night. Refills sent.   Start Vitamin D 5000IU once daily. Recheck vitamin D labs in 2 months   Mental health referral placed  Kate Figueroa in 2-3 months     INTERVAL HISTORY:  New MWM - 2/21/2023   GLP-1 not covered by insurance   Started Topiramate with approval from psychiatrist   Insulin Resistance - CHANDNI-IR score 5.8      Weight plateued over the past 3 weeks. Stopped all medications for 3 weeks due to mental health.     Anti-obesity medications:     Current:   Topriamate 75mg - had stopped the medication for around 3 weeks due to mental health. Reramped herself by prescription, will be starting 2 tablets tonight. Has tingling in fingers and carbonation taste perversion, but otherwise well tolerated. No change in mood. Very helpful with hunger and intrusive thoughts of food.       Recent diet changes: Was going really well and being intentional about food choices. Harder during depression period, but getting back on track. Decrease in snacking.   Breakfast - skips  Lunch - soup, salad, sushi  Dinner - protein + vegetable     Recent exercise/activity changes: Going to the 5-7xweek. At times will go 2xday, but only for 20min. Cardio + strength training.     Recent stressors:  Mood currently is doing better. Had a recent depressive episode, but has been doing well. Transferred prescriptions to primary. Would like a therapy referral today.     Recent sleep changes: No concerns     Vitamins/Labs: Needs to start Vitamin D. Discussed insulin resistance results.     CURRENT WEIGHT:   143 lbs 0 oz    Initial Weight (lbs): 175 lbs  Last Visits Weight: 79.4 kg (175 lb)  Cumulative weight loss (lbs): 32  Weight Loss Percentage: 18.29%     Wt Readings from Last 5 Encounters:   06/14/23 64.9 kg (143 lb)   03/13/23 79.4 kg (175 lb)   02/21/23 79.4 kg (175 lb)   12/19/22 78.5 kg (173 lb)   11/03/22 74.8 kg (165 lb)             6/14/2023    11:14 AM   Changes and Difficulties   I have made the  following changes to my diet since my last visit: Healthier choices   With regards to my diet, I am still struggling with: No   I have made the following changes to my activity/exercise since my last visit: More active   With regards to my activity/exercise, I am still struggling with: No         MEDICATIONS:   Current Outpatient Medications   Medication Sig Dispense Refill    albuterol (PROAIR HFA/PROVENTIL HFA/VENTOLIN HFA) 108 (90 Base) MCG/ACT inhaler Inhale 2 puffs into the lungs every 6 hours as needed for shortness of breath, wheezing or cough 18 g 0    ARIPiprazole (ABILIFY) 2 MG tablet Take 1 tablet by mouth daily at 2 pm      Cholecalciferol (VITAMIN D-3) 125 MCG (5000 UT) TABS Take 1 capsule by mouth daily 30 tablet 3    escitalopram (LEXAPRO) 5 MG tablet Take 5 mg by mouth daily      loratadine (CLARITIN) 10 MG tablet Take 10 mg by mouth      spironolactone (ALDACTONE) 100 MG tablet Take 1 tablet (100 mg) by mouth daily 90 tablet 3    topiramate (TOPAMAX) 25 MG tablet Take 50mg at bedtime for 7 days, then increase to 75mg at bedtime thereafter 90 tablet 2    cromolyn sodium (NASALCROM) 5.2 MG/ACT nasal aerosol 1 spray each nostril 1-3x daily as needed for nasal congestion (Patient not taking: Reported on 6/14/2023) 26 mL 3    ORDER FOR ALLERGEN IMMUNOTHERAPY M/DM/D/RW  MM Mold Mix  1:10 w/v, 1.0 ml  DFM Df Mite 10,000 AU, 0.5 ml  DPM Dp Mite10,000 AU, 0.5 ml  DOG AP Dog hair & dander, mixed breeds 1:10 w/v, 0.5 ml  RW Ragweed, Short ambrosia artemisifolia 1:20 w/v, 1.0 ml   POLLENS/ CAT  G GRASS MIX  100, 000 BAU 0.3 ml  MUG Sagebrush, Mugwort,  1:20 w/v, 0.5 ml  CAT Cat Hair 10,000 BAU 2.0 ml  Trees  BIR Birch Mix Paper, River/Red White Birch 1:20 w/v, 0.5 ml  POP [place] common Populus deltoides 1:20 w/v, 0.5 ml  ELM Elm, American Ulmus Americana 1:20 w/v, 0.5 ml  MAP Maple, Hard/Sugar Acer saccharum 1:20 w/v, 0.5 ml  [place] Red Quercus Darlene 1:20 w/v, 0.5 ml  RUSH Rush, White Fraxinus Americana 1:20  "w/v, 0.5 ml  MUL [place] Mix RW (Red, White) 1:20 w/v, 0.5 ml  Dilutions: None (red) Frequency: weekly  1/10 (yellow) Frequency: weekly  1/100 (blue) Frequency: weekly  1/1000 (green) Frequency: weekly      Diluent: HSA qs to 5ml 5 mL 11    semaglutide (OZEMPIC) 2 MG/1.5ML SOPN pen Inject 0.25 mg subcutaneously once weekly for 4 weeks then 0.5 mg once weekly. (Patient not taking: Reported on 6/14/2023) 1.5 mL 1           6/14/2023    11:14 AM   Weight Loss Medication History Reviewed With Patient   Which weight loss medications are you currently taking on a regular basis? Topamax (topiramate)   If you are not taking a weight loss medication that was prescribed to you, please indicate why: Other   Are you having any side effects from the weight loss medication that we have prescribed you? No             Objective    Ht 1.549 m (5' 1\")   Wt 64.9 kg (143 lb)   BMI 27.02 kg/m             PHYSICAL EXAM:  GENERAL: Healthy, alert and no distress  EYES: Eyes grossly normal to inspection.  No discharge or erythema, or obvious scleral/conjunctival abnormalities.  RESP: No audible wheeze, cough, or visible cyanosis.  No visible retractions or increased work of breathing.    SKIN: Visible skin clear. No significant rash, abnormal pigmentation or lesions.  NEURO: Cranial nerves grossly intact.  Mentation and speech appropriate for age.  PSYCH: Mentation appears normal, affect normal/bright, judgement and insight intact, normal speech and appearance well-groomed.        Sincerely,    TOMER BAILEY PA-C      26 minutes spent by me on the date of the encounter doing chart review, history and exam, documentation and further activities per the note    "

## 2023-06-14 NOTE — PROGRESS NOTES
Kerri is a 27 year old who is being evaluated via a billable video visit.      How would you like to obtain your AVS? MyChart  If the video visit is dropped, the invitation should be resent by: Text to cell phone: [phone]  Will anyone else be joining your video visit? No        Video-Visit Details    Type of service:  Video Visit     Originating Location (pt. Location): Home    Distant Location (provider location):  Off-site  Platform used for Video Visit: [facility] Medical Weight Management Note     Cindy Contreras  MRN:  [id number]  :  1995  ROBERTO:  2023    Dear Siobhan Covington, ABILIO MURRAY,    I had the pleasure of seeing your patient Cindy Contreras. She is a 27 year old female who I am continuing to see for treatment of obesity related to:        2023     1:56 PM   --   I have the following health issues associated with obesity None of the above   I have the following symptoms associated with obesity Depression    Back Pain    Fatigue       Assessment & Plan   Problem List Items Addressed This Visit        Digestive    Class 1 obesity without serious comorbidity with body mass index (BMI) of 33.0 to 33.9 in adult    Relevant Medications    topiramate (TOPAMAX) 25 MG tablet       Behavioral    Bipolar 2 disorder (H) - Primary    Relevant Orders    Adult Mental Health  Referral   Other Visit Diagnoses     Vitamin D deficiency        Relevant Medications    Cholecalciferol (VITAMIN D-3) 125 MCG (5000 UT) TABS    Other Relevant Orders    Vitamin D Deficiency    Parathyroid Hormone Intact         1. Continue reramp of Topiramate 25mg - 2 tablets at night for 7 days, then increase 3 tablets at night. Refills sent.   2. Start Vitamin D 5000IU once daily. Recheck vitamin D labs in 2 months   3. Mental health referral placed  4. Kate Figueroa in 2-3 months     INTERVAL HISTORY:  New MWM - 2023   GLP-1 not covered by insurance   Started Topiramate with approval from psychiatrist    Insulin Resistance - CHANDNI-IR score 5.8      Weight plateued over the past 3 weeks. Stopped all medications for 3 weeks due to mental health.     Anti-obesity medications:     Current:   Topriamate 75mg - had stopped the medication for around 3 weeks due to mental health. Reramped herself by prescription, will be starting 2 tablets tonight. Has tingling in fingers and carbonation taste perversion, but otherwise well tolerated. No change in mood. Very helpful with hunger and intrusive thoughts of food.       Recent diet changes: Was going really well and being intentional about food choices. Harder during depression period, but getting back on track. Decrease in snacking.   Breakfast - skips  Lunch - soup, salad, sushi  Dinner - protein + vegetable     Recent exercise/activity changes: Going to the 5-7xweek. At times will go 2xday, but only for 20min. Cardio + strength training.     Recent stressors:  Mood currently is doing better. Had a recent depressive episode, but has been doing well. Transferred prescriptions to primary. Would like a therapy referral today.     Recent sleep changes: No concerns     Vitamins/Labs: Needs to start Vitamin D. Discussed insulin resistance results.     CURRENT WEIGHT:   143 lbs 0 oz    Initial Weight (lbs): 175 lbs  Last Visits Weight: 79.4 kg (175 lb)  Cumulative weight loss (lbs): 32  Weight Loss Percentage: 18.29%     Wt Readings from Last 5 Encounters:   06/14/23 64.9 kg (143 lb)   03/13/23 79.4 kg (175 lb)   02/21/23 79.4 kg (175 lb)   12/19/22 78.5 kg (173 lb)   11/03/22 74.8 kg (165 lb)             6/14/2023    11:14 AM   Changes and Difficulties   I have made the following changes to my diet since my last visit: Healthier choices   With regards to my diet, I am still struggling with: No   I have made the following changes to my activity/exercise since my last visit: More active   With regards to my activity/exercise, I am still struggling with: No         MEDICATIONS:    Current Outpatient Medications   Medication Sig Dispense Refill     albuterol (PROAIR HFA/PROVENTIL HFA/VENTOLIN HFA) 108 (90 Base) MCG/ACT inhaler Inhale 2 puffs into the lungs every 6 hours as needed for shortness of breath, wheezing or cough 18 g 0     ARIPiprazole (ABILIFY) 2 MG tablet Take 1 tablet by mouth daily at 2 pm       Cholecalciferol (VITAMIN D-3) 125 MCG (5000 UT) TABS Take 1 capsule by mouth daily 30 tablet 3     escitalopram (LEXAPRO) 5 MG tablet Take 5 mg by mouth daily       loratadine (CLARITIN) 10 MG tablet Take 10 mg by mouth       spironolactone (ALDACTONE) 100 MG tablet Take 1 tablet (100 mg) by mouth daily 90 tablet 3     topiramate (TOPAMAX) 25 MG tablet Take 50mg at bedtime for 7 days, then increase to 75mg at bedtime thereafter 90 tablet 2     cromolyn sodium (NASALCROM) 5.2 MG/ACT nasal aerosol 1 spray each nostril 1-3x daily as needed for nasal congestion (Patient not taking: Reported on 6/14/2023) 26 mL 3     ORDER FOR ALLERGEN IMMUNOTHERAPY M/DM/D/RW  MM Mold Mix  1:10 w/v, 1.0 ml  DFM Df Mite 10,000 AU, 0.5 ml  DPM Dp Mite10,000 AU, 0.5 ml  DOG AP Dog hair & dander, mixed breeds 1:10 w/v, 0.5 ml  RW Ragweed, Short ambrosia artemisifolia 1:20 w/v, 1.0 ml   POLLENS/ CAT  G GRASS MIX  100, 000 BAU 0.3 ml  MUG Sagebrush, Mugwort,  1:20 w/v, 0.5 ml  CAT Cat Hair 10,000 BAU 2.0 ml  Trees  BIR Birch Mix Paper, River/Red White Birch 1:20 w/v, 0.5 ml  POP [place] common Populus deltoides 1:20 w/v, 0.5 ml  ELM Elm, American Ulmus Americana 1:20 w/v, 0.5 ml  MAP Maple, Hard/Sugar Acer saccharum 1:20 w/v, 0.5 ml  [place] Red Quercus Darlene 1:20 w/v, 0.5 ml  RUSH Rush, White Fraxinus Americana 1:20 w/v, 0.5 ml  MUL [place] Mix RW (Red, White) 1:20 w/v, 0.5 ml  Dilutions: None (red) Frequency: weekly  1/10 (yellow) Frequency: weekly  1/100 (blue) Frequency: weekly  1/1000 (green) Frequency: weekly      Diluent: HSA qs to 5ml 5 mL 11     semaglutide (OZEMPIC) 2 MG/1.5ML SOPN pen Inject 0.25 mg  "subcutaneously once weekly for 4 weeks then 0.5 mg once weekly. (Patient not taking: Reported on 6/14/2023) 1.5 mL 1           6/14/2023    11:14 AM   Weight Loss Medication History Reviewed With Patient   Which weight loss medications are you currently taking on a regular basis? Topamax (topiramate)   If you are not taking a weight loss medication that was prescribed to you, please indicate why: Other   Are you having any side effects from the weight loss medication that we have prescribed you? No             Objective    Ht 1.549 m (5' 1\")   Wt 64.9 kg (143 lb)   BMI 27.02 kg/m             PHYSICAL EXAM:  GENERAL: Healthy, alert and no distress  EYES: Eyes grossly normal to inspection.  No discharge or erythema, or obvious scleral/conjunctival abnormalities.  RESP: No audible wheeze, cough, or visible cyanosis.  No visible retractions or increased work of breathing.    SKIN: Visible skin clear. No significant rash, abnormal pigmentation or lesions.  NEURO: Cranial nerves grossly intact.  Mentation and speech appropriate for age.  PSYCH: Mentation appears normal, affect normal/bright, judgement and insight intact, normal speech and appearance well-groomed.        Sincerely,    TOMER BAILEY PA-C      26 minutes spent by me on the date of the encounter doing chart review, history and exam, documentation and further activities per the note  "

## 2023-06-16 NOTE — PATIENT INSTRUCTIONS
"Franklin Manning,  Thank you for allowing us the privilege of caring for you. We hope we provided you with the excellent service you deserve.   Please let us know if there is anything else we can do for you so that we can be sure you are completely satisfied with your care experience.    To ensure the quality of our services you may be receiving a patient satisfaction survey from an independent patient satisfaction monitoring company.    The greatest compliment you can give is a \"Likely to Recommend\"    Your visit was with TOMER BAILEY PA-C today.    Instructions per today's visit:     Follow up  plan:  Continue reramp of Topiramate 25mg - 2 tablets at night for 7 days, then increase 3 tablets at night. Refills sent.   Start Vitamin D 5000IU once daily. Recheck vitamin D labs in 2 months   Mental health referral placed  Tomer Figueroa in 2-3 months   ___________________________________________________________________________  Important contact and scheduling information:  Please call our contact center at [phone] to schedule your next appointments.  For any nursing questions or concerns call Lizett Fernandez LPN at [phone] or Agatha Cardenas RN at [phone]  Please call during clinic hours Monday through Friday 8:00a - 4:00p if you have questions or you can contact us via [employer] at anytime and we will reply during clinic hours.    Lab results will be communicated through My Chart or letter (if My Chart not used). Please call the clinic if you have not received communication after 1 week or if you have any questions.?  Clinic Fax: [phone]  __________________________________________________________________________    If labs were ordered today:    Please make an appointment to have them drawn at your convenience.     To schedule the Lab Appointment using [employer]:  Select \"Schedule an Appointment\"  Select \"Lab Only\"  For \"A couple of questions\", select \"Other\"  For \"Which locations work for you?, select the location " and set up the appointment    To schedule by phone call [phone] to schedule a lab only appointment at any [facility] lab.  ___________________________________________________________________________  Work with A Health !  Virtual Sessions are Available through [facility] Weight Management Clinics    To learn more, call to schedule a free, Health  Q&A appointment: [phone]     What is Health Coaching?  Do you know what you are supposed to do, but you just aren't doing it?  Then, HEALTH COACHING may help you!   Get unstuck and move forward with the support of a professionally trained [employer]-HWC (National Board-Certified Health and ) who uses evidence-based approaches to help you move forward with healthy lifestyle changes in the areas of weight loss, stress management and overall well-being.    Health Coaches help you identify goals that will work best for you. Health Coaches provide support and encouragement with overcoming barriers and help you to find inspiration and motivation to lead a healthy lifestyle.    Option one:  Health Coaching 3-Pack; Three, 30-minute Health Coaching Visits, for $99  Visits are done virtually (phone or video)  This is a self pay service; we do not accept insurance for cait coaching.    Option two:   The 24 week Plan; 11 Health Coaching Visits, and a 7 months subscription to [employer]-- on-demand fitness, nutrition and mindfulness classes, for $499 (employee discounts may be available). Participants will also meet regularly with a weight management Medical Provider and a Registered/Licensed Dietician.  This is a self-pay service; we do not accept insurance for health coaching.    To Schedule a free Health  Q&A appointment to learn more,  call [phone].  ____________________________________________________________________    [facility]   Healthy Lifestyle Virtual Support Group    Healthy  Lifestyle Virtual Support Group?  This is 60 minutes of small group guided discussion, support and resources. All are welcome who want a healthy lifestyle.  WHEN: Starting in July 2023, this group meets the 1st Friday of the month from 12:30 PM - 1:30 PM virtually using [employer] Teams.    FACILITATOR: Led by National Board Certified Health and , Geetha Newberry [facility].   TO REGISTER: Please send an email to [email or web address] [email or web address] to receive monthly invites to the group or if you have any questions about having a health .  Prior to the meeting, a link with directions on how to join the meeting will be sent to you.    2023 Meetings  May 19: Let's Talk  June 9: Create Your Coaching Toolkit: Learn How to  Yourself  July 7: Let's Talk  August 4: Benefits of Fiber with ADILIA Booker  September 1: Show and Tell (share your aps, podcasts, recipes, hacks, books)  October 6 :Let's Talk  November 3: Introduction to Mindfulness   December 1: Let's Talk    If you would like bariatric surgery specific support group info please let your care team know.         Thank you,   [facility] Comprehensive Weight Management Team

## 2023-06-20 ENCOUNTER — OFFICE VISIT (OUTPATIENT)
Dept: FAMILY MEDICINE | Facility: CLINIC | Age: 28
End: 2023-06-20
Payer: COMMERCIAL

## 2023-06-20 VITALS
WEIGHT: 148 LBS | HEART RATE: 75 BPM | TEMPERATURE: 98.2 F | BODY MASS INDEX: 27.94 KG/M2 | DIASTOLIC BLOOD PRESSURE: 68 MMHG | RESPIRATION RATE: 16 BRPM | OXYGEN SATURATION: 99 % | HEIGHT: 61 IN | SYSTOLIC BLOOD PRESSURE: 99 MMHG

## 2023-06-20 DIAGNOSIS — Z30.011 ENCOUNTER FOR INITIAL PRESCRIPTION OF CONTRACEPTIVE PILLS: ICD-10-CM

## 2023-06-20 DIAGNOSIS — Z12.4 CERVICAL CANCER SCREENING: ICD-10-CM

## 2023-06-20 DIAGNOSIS — F31.81 BIPOLAR 2 DISORDER (H): ICD-10-CM

## 2023-06-20 DIAGNOSIS — Z11.3 ROUTINE SCREENING FOR STI (SEXUALLY TRANSMITTED INFECTION): Primary | ICD-10-CM

## 2023-06-20 LAB
ALBUMIN SERPL BCG-MCNC: 4.5 G/DL (ref 3.5–5.2)
ALP SERPL-CCNC: 88 U/L (ref 35–104)
ALT SERPL W P-5'-P-CCNC: 30 U/L (ref 0–50)
ANION GAP SERPL CALCULATED.3IONS-SCNC: 14 MMOL/L (ref 7–15)
AST SERPL W P-5'-P-CCNC: 24 U/L (ref 0–45)
BILIRUB DIRECT SERPL-MCNC: <0.2 MG/DL (ref 0–0.3)
BILIRUB SERPL-MCNC: 0.5 MG/DL
BUN SERPL-MCNC: 11.5 MG/DL (ref 6–20)
CALCIUM SERPL-MCNC: 9.2 MG/DL (ref 8.6–10)
CHLORIDE SERPL-SCNC: 104 MMOL/L (ref 98–107)
CLUE CELLS: ABNORMAL
CREAT SERPL-MCNC: 0.7 MG/DL (ref 0.51–0.95)
DEPRECATED HCO3 PLAS-SCNC: 21 MMOL/L (ref 22–29)
GFR SERPL CREATININE-BSD FRML MDRD: >90 ML/MIN/1.73M2
GLUCOSE SERPL-MCNC: 83 MG/DL (ref 70–99)
HCV AB SERPL QL IA: NONREACTIVE
HIV 1+2 AB+HIV1 P24 AG SERPL QL IA: NONREACTIVE
POTASSIUM SERPL-SCNC: 4.3 MMOL/L (ref 3.4–5.3)
PROT SERPL-MCNC: 7.1 G/DL (ref 6.4–8.3)
SODIUM SERPL-SCNC: 139 MMOL/L (ref 136–145)
T PALLIDUM AB SER QL: NONREACTIVE
TRICHOMONAS, WET PREP: ABNORMAL
WBC'S/HIGH POWER FIELD, WET PREP: ABNORMAL
YEAST, WET PREP: ABNORMAL

## 2023-06-20 PROCEDURE — 84702 CHORIONIC GONADOTROPIN TEST: CPT | Performed by: FAMILY MEDICINE

## 2023-06-20 PROCEDURE — 87210 SMEAR WET MOUNT SALINE/INK: CPT | Performed by: FAMILY MEDICINE

## 2023-06-20 PROCEDURE — 86780 TREPONEMA PALLIDUM: CPT | Performed by: FAMILY MEDICINE

## 2023-06-20 PROCEDURE — 99214 OFFICE O/P EST MOD 30 MIN: CPT | Mod: 25 | Performed by: FAMILY MEDICINE

## 2023-06-20 PROCEDURE — 87491 CHLMYD TRACH DNA AMP PROBE: CPT | Performed by: FAMILY MEDICINE

## 2023-06-20 PROCEDURE — 0134A COVID-19 BIVALENT 18+ (MODERNA): CPT | Performed by: FAMILY MEDICINE

## 2023-06-20 PROCEDURE — 36415 COLL VENOUS BLD VENIPUNCTURE: CPT | Performed by: FAMILY MEDICINE

## 2023-06-20 PROCEDURE — 91313 COVID-19 BIVALENT 18+ (MODERNA): CPT | Performed by: FAMILY MEDICINE

## 2023-06-20 PROCEDURE — 90651 9VHPV VACCINE 2/3 DOSE IM: CPT | Performed by: FAMILY MEDICINE

## 2023-06-20 PROCEDURE — 86803 HEPATITIS C AB TEST: CPT | Performed by: FAMILY MEDICINE

## 2023-06-20 PROCEDURE — 82248 BILIRUBIN DIRECT: CPT | Performed by: FAMILY MEDICINE

## 2023-06-20 PROCEDURE — 80053 COMPREHEN METABOLIC PANEL: CPT | Performed by: FAMILY MEDICINE

## 2023-06-20 PROCEDURE — 90471 IMMUNIZATION ADMIN: CPT | Performed by: FAMILY MEDICINE

## 2023-06-20 PROCEDURE — 87591 N.GONORRHOEAE DNA AMP PROB: CPT | Performed by: FAMILY MEDICINE

## 2023-06-20 PROCEDURE — 87389 HIV-1 AG W/HIV-1&-2 AB AG IA: CPT | Performed by: FAMILY MEDICINE

## 2023-06-20 PROCEDURE — G0145 SCR C/V CYTO,THINLAYER,RESCR: HCPCS | Performed by: FAMILY MEDICINE

## 2023-06-20 RX ORDER — NORGESTIMATE AND ETHINYL ESTRADIOL 0.25-0.035
1 KIT ORAL DAILY
Qty: 84 TABLET | Refills: 1 | Status: SHIPPED | OUTPATIENT
Start: 2023-06-20 | End: 2023-09-14

## 2023-06-20 RX ORDER — ARIPIPRAZOLE 5 MG/1
TABLET ORAL
COMMUNITY
Start: 2023-06-18 | End: 2023-11-20

## 2023-06-20 ASSESSMENT — PATIENT HEALTH QUESTIONNAIRE - PHQ9
SUM OF ALL RESPONSES TO PHQ QUESTIONS 1-9: 18
10. IF YOU CHECKED OFF ANY PROBLEMS, HOW DIFFICULT HAVE THESE PROBLEMS MADE IT FOR YOU TO DO YOUR WORK, TAKE CARE OF THINGS AT HOME, OR GET ALONG WITH OTHER PEOPLE: VERY DIFFICULT
SUM OF ALL RESPONSES TO PHQ QUESTIONS 1-9: 18

## 2023-06-20 ASSESSMENT — COLUMBIA-SUICIDE SEVERITY RATING SCALE - C-SSRS
2. IN THE PAST MONTH, HAVE YOU ACTUALLY HAD ANY THOUGHTS OF KILLING YOURSELF?: NO
1. WITHIN THE PAST MONTH, HAVE YOU WISHED YOU WERE DEAD OR WISHED YOU COULD GO TO SLEEP AND NOT WAKE UP?: NO

## 2023-06-20 NOTE — PROGRESS NOTES
"  Assessment & Plan     Routine screening for STI (sexually transmitted infection)  Patient requested full STI screening.  She has no symptoms.  No contacts to any condition that she knows of.  - Chlamydia & Gonorrhea by PCR, [facility] - Clinic Collect  - Treponema Abs w Reflex to RPR and Titer  - HIV Antigen Antibody Combo  - Hepatitis C Screen Reflex to HCV RNA Quant and Genotype  - Wet prep - lab collect    Encounter for initial prescription of contraceptive pills  We discussed options for contraception.  Patient is able to take medications daily.  Side effects with previous attempts at LARC and she does not wish to try I&D of those again.  She is okay with the idea of OCPs.  Discussed estrogen and tobacco/nicotine use.  She currently vapes.  Does not want to try to quit right now.  Clotting risk discussed and patient expresses understanding of risk.  - norgestimate-ethinyl estradiol (ORTHO-CYCLEN) 0.25-35 MG-MCG tablet  Dispense: 84 tablet; Refill: 1    Cervical cancer screening  Pap smear completed.  - Pap Screen reflex to HPV if ASCUS - recommend age 25 - 29    Bipolar 2 disorder (H)  We will check basic metabolic panel and hepatic panel given her other medications.  Had been working with an online clinic option, but due to costs she can no longer be seen there.  Referred to mental health care from primary but not able to be seen until January.  Monitor moods closely with addition of hormones.  Discussed we could make adjustments to estrogen or progesterone doses depending on side effects or concerns with mood.    - Basic metabolic panel  (Ca, Cl, CO2, Creat, Gluc, K, Na, BUN)     BMI:   Estimated body mass index is 27.96 kg/m  as calculated from the following:    Height as of this encounter: 1.549 m (5' 1\").    Weight as of this encounter: 67.1 kg (148 lb).       Depression Screening Follow Up        6/20/2023     6:48 AM   PHQ   PHQ-9 Total Score 18   Q9: Thoughts of better off dead/self-harm past 2 weeks " Several days   F/U: Thoughts of suicide or self-harm No   F/U: Safety concerns No         6/20/2023     6:48 AM   Last PHQ-9   1.  Little interest or pleasure in doing things 3   2.  Feeling down, depressed, or hopeless 2   3.  Trouble falling or staying asleep, or sleeping too much 3   4.  Feeling tired or having little energy 3   5.  Poor appetite or overeating 1   6.  Feeling bad about yourself 3   7.  Trouble concentrating 2   8.  Moving slowly or restless 0   Q9: Thoughts of better off dead/self-harm past 2 weeks 1   PHQ-9 Total Score 18   In the past two weeks have you had thoughts of suicide or self harm? No   Do you have concerns about your personal safety or the safety of others? No           6/20/2023     7:50 AM   C-SSRS (Brief [place])   Within the last month, have you wished you were dead or wished you could go to sleep and not wake up? No   Within the last month, have you had any actual thoughts of killing yourself? No     Follow Up          Follow Up Actions Taken  Crisis resource information provided in the After Visit Summary  Referred patient back to mental health provider    Alfredo Severino MD  [facility]    Og Manning is a 27 year old, presenting for the following health issues:  Contraception (Discuss birth control options) and STD (Routine STD testing)        6/20/2023     6:55 AM   Additional Questions   Roomed by Florian   Accompanied by self     Contraception    STD    History of Present Illness       Reason for visit:  Sti check/birth control    She eats 2-3 servings of fruits and vegetables daily.She consumes 0 sweetened beverage(s) daily.She exercises with enough effort to increase her heart rate 30 to 60 minutes per day.  She exercises with enough effort to increase her heart rate 4 days per week. She is missing 2 dose(s) of medications per week.    Today's PHQ-9         PHQ-9 Total Score: 18    PHQ-9 Q9 Thoughts of better off dead/self-harm past 2 weeks :  "  Several days  Thoughts of suicide or self harm: (P) No  Self-harm Plan:     Self-harm Action:       Safety concerns for self or others: (P) No    How difficult have these problems made it for you to do your work, take care of things at home, or get along with other people: Very difficult     Patient here to discuss options for contraception.  She took Plan B 2 days ago.  Had some significant cramping and nausea after that.  Believes.  Should be coming at the end of this month in about 10 days.  Has had multiple trials of different contraceptive options in the past.  Had a Mirena IUD but it was significantly painful.  Patient could not tolerate it and does not want to consider another IUD.  She has had Nexplanon as well but believes that was causing some difficulty with her moods, which was difficult given her bipolar diagnosis.  She is tried the patch which caused nausea.  She thinks she has been on Depo in the past and also on OCPs.    Patient does smoke.  She has no personal or family history of VTE.        Objective    BP 99/68 (BP Location: Left arm, Patient Position: Sitting, Cuff Size: Adult Regular)   Pulse 75   Temp 98.2  F (36.8  C) (Temporal)   Resp 16   Ht 1.549 m (5' 1\")   Wt 67.1 kg (148 lb)   LMP 05/29/2023   SpO2 99%   BMI 27.96 kg/m    Body mass index is 27.96 kg/m .  Physical Exam   GENERAL: alert, not distressed  CHEST: clear, no rales, rhonchi, or wheezes  CARDIAC: regular without murmur, gallop, or rub  ABDOMEN: soft, non tender, non distended, normal bowel sounds  : Normal vulva and labia, normal vaginal mucosa, some mild white discharge, normal cervical appearance.      No results found for this or any previous visit (from the past 24 hour(s)).         Prior to immunization administration, verified patients identity using patient s name and date of birth. Please see Immunization Activity for additional information.     Screening Questionnaire for Adult Immunization    Are you sick " today?   No   Do you have allergies to medications, food, a vaccine component or latex?   Yes   Have you ever had a serious reaction after receiving a vaccination?   No   Do you have a long-term health problem with heart, lung, kidney, or metabolic disease (e.g., diabetes), asthma, a blood disorder, no spleen, complement component deficiency, a cochlear implant, or a spinal fluid leak?  Are you on long-term aspirin therapy?   No   Do you have cancer, leukemia, HIV/AIDS, or any other immune system problem?   No   Do you have a parent, brother, or sister with an immune system problem?   No   In the past 3 months, have you taken medications that affect  your immune system, such as prednisone, other steroids, or anticancer drugs; drugs for the treatment of rheumatoid arthritis, Crohn s disease, or psoriasis; or have you had radiation treatments?   No   Have you had a seizure, or a brain or other nervous system problem?   No   During the past year, have you received a transfusion of blood or blood    products, or been given immune (gamma) globulin or antiviral drug?   NO   For women: Are you pregnant or is there a chance you could become       pregnant during the next month?   Dont Know   Have you received any vaccinations in the past 4 weeks?   No     Immunization questionnaire was positive for at least one answer.  Notified Dr. Severino.      Patient instructed to remain in clinic for 15 minutes afterwards, and to report any adverse reactions.     Screening performed by Florian Charles on 6/20/2023 at 6:58 AM.

## 2023-06-21 LAB
C TRACH DNA SPEC QL PROBE+SIG AMP: NEGATIVE
HCG INTACT+B SERPL-ACNC: <1 MIU/ML
N GONORRHOEA DNA SPEC QL NAA+PROBE: NEGATIVE

## 2023-06-22 LAB
BKR LAB AP GYN ADEQUACY: NORMAL
BKR LAB AP GYN INTERPRETATION: NORMAL
BKR LAB AP HPV REFLEX: NORMAL
BKR LAB AP LMP: NORMAL
BKR LAB AP PREVIOUS ABNORMAL: NORMAL
PATH REPORT.COMMENTS IMP SPEC: NORMAL
PATH REPORT.COMMENTS IMP SPEC: NORMAL
PATH REPORT.RELEVANT HX SPEC: NORMAL

## 2023-07-25 ENCOUNTER — OFFICE VISIT (OUTPATIENT)
Dept: FAMILY MEDICINE | Facility: CLINIC | Age: 28
End: 2023-07-25
Payer: COMMERCIAL

## 2023-07-25 VITALS
HEART RATE: 70 BPM | TEMPERATURE: 98.4 F | OXYGEN SATURATION: 98 % | WEIGHT: 144 LBS | RESPIRATION RATE: 20 BRPM | HEIGHT: 61 IN | BODY MASS INDEX: 27.19 KG/M2 | SYSTOLIC BLOOD PRESSURE: 102 MMHG | DIASTOLIC BLOOD PRESSURE: 62 MMHG

## 2023-07-25 DIAGNOSIS — K64.4 EXTERNAL HEMORRHOIDS: Primary | ICD-10-CM

## 2023-07-25 PROCEDURE — 99213 OFFICE O/P EST LOW 20 MIN: CPT | Performed by: NURSE PRACTITIONER

## 2023-07-25 RX ORDER — MULTIPLE VITAMINS W/ MINERALS TAB 9MG-400MCG
1 TAB ORAL DAILY
COMMUNITY

## 2023-07-25 RX ORDER — HYDROCORTISONE 25 MG/G
CREAM TOPICAL 2 TIMES DAILY PRN
Qty: 60 G | Refills: 1 | Status: SHIPPED | OUTPATIENT
Start: 2023-07-25 | End: 2023-08-23

## 2023-07-25 ASSESSMENT — ANXIETY QUESTIONNAIRES
4. TROUBLE RELAXING: SEVERAL DAYS
7. FEELING AFRAID AS IF SOMETHING AWFUL MIGHT HAPPEN: SEVERAL DAYS
5. BEING SO RESTLESS THAT IT IS HARD TO SIT STILL: SEVERAL DAYS
GAD7 TOTAL SCORE: 8
1. FEELING NERVOUS, ANXIOUS, OR ON EDGE: SEVERAL DAYS
3. WORRYING TOO MUCH ABOUT DIFFERENT THINGS: SEVERAL DAYS
6. BECOMING EASILY ANNOYED OR IRRITABLE: MORE THAN HALF THE DAYS
GAD7 TOTAL SCORE: 8
IF YOU CHECKED OFF ANY PROBLEMS ON THIS QUESTIONNAIRE, HOW DIFFICULT HAVE THESE PROBLEMS MADE IT FOR YOU TO DO YOUR WORK, TAKE CARE OF THINGS AT HOME, OR GET ALONG WITH OTHER PEOPLE: SOMEWHAT DIFFICULT
2. NOT BEING ABLE TO STOP OR CONTROL WORRYING: SEVERAL DAYS

## 2023-07-25 ASSESSMENT — PATIENT HEALTH QUESTIONNAIRE - PHQ9
SUM OF ALL RESPONSES TO PHQ QUESTIONS 1-9: 10
SUM OF ALL RESPONSES TO PHQ QUESTIONS 1-9: 10
10. IF YOU CHECKED OFF ANY PROBLEMS, HOW DIFFICULT HAVE THESE PROBLEMS MADE IT FOR YOU TO DO YOUR WORK, TAKE CARE OF THINGS AT HOME, OR GET ALONG WITH OTHER PEOPLE: SOMEWHAT DIFFICULT

## 2023-07-25 ASSESSMENT — PAIN SCALES - GENERAL: PAINLEVEL: MILD PAIN (2)

## 2023-07-25 NOTE — PROGRESS NOTES
"  Og Manning is a 27 year old, presenting for the following health issues:  Hemorrhoids (Having a flare up of her hemorrhoids for the past couple weeks)        7/25/2023     3:02 PM   Additional Questions   Roomed by Sonia SMITH CMA     History of Present Illness       Reason for visit:  Gi issues  Symptom onset:  1-2 weeks ago  Symptoms include:  Pain  Symptom intensity:  Moderate  Symptom progression:  Staying the same  Had these symptoms before:  No  What makes it worse:  No  What makes it better:  No    She eats 2-3 servings of fruits and vegetables daily.She consumes 0 sweetened beverage(s) daily.She exercises with enough effort to increase her heart rate 30 to 60 minutes per day.  She exercises with enough effort to increase her heart rate 4 days per week. She is missing 2 dose(s) of medications per week.     Hemorrhoids x 1-2 weeks.  Uncomfortable but not particularly painful.  No bleeding.     Review of Systems   Constitutional, HEENT, cardiovascular, pulmonary, gi and gu systems are negative, except as otherwise noted.      Objective    /62 (BP Location: Left arm, Patient Position: Sitting, Cuff Size: Adult Regular)   Pulse 70   Temp 98.4  F (36.9  C) (Oral)   Resp 20   Ht 1.549 m (5' 1\")   Wt 65.3 kg (144 lb)   LMP 07/12/2023 (Exact Date)   SpO2 98%   Breastfeeding No   BMI 27.21 kg/m    Body mass index is 27.21 kg/m .  Physical Exam   GENERAL: healthy, alert and no distress  RESP: lungs clear to auscultation - no rales, rhonchi or wheezes  RECTAL (female): 2 external hemorrhoids   MS: no gross musculoskeletal defects noted, no edema  PSYCH: mentation appears normal, affect normal/bright    A/P:  (K64.4) External hemorrhoids  (primary encounter diagnosis)  Comment:   Plan: hydrocortisone, Perianal, (HYDROCORTISONE) 2.5         % cream                        "

## 2023-08-23 ENCOUNTER — VIRTUAL VISIT (OUTPATIENT)
Dept: ENDOCRINOLOGY | Facility: CLINIC | Age: 28
End: 2023-08-23
Payer: COMMERCIAL

## 2023-08-23 VITALS — WEIGHT: 144 LBS | HEIGHT: 61 IN | BODY MASS INDEX: 27.19 KG/M2

## 2023-08-23 DIAGNOSIS — E66.811 CLASS 1 OBESITY WITHOUT SERIOUS COMORBIDITY WITH BODY MASS INDEX (BMI) OF 33.0 TO 33.9 IN ADULT, UNSPECIFIED OBESITY TYPE: ICD-10-CM

## 2023-08-23 PROCEDURE — 99214 OFFICE O/P EST MOD 30 MIN: CPT | Mod: VID

## 2023-08-23 RX ORDER — PHENTERMINE HYDROCHLORIDE 15 MG/1
15 CAPSULE ORAL EVERY MORNING
Qty: 30 CAPSULE | Refills: 3 | Status: SHIPPED | OUTPATIENT
Start: 2023-08-23 | End: 2024-04-06

## 2023-08-23 RX ORDER — PHENTERMINE HYDROCHLORIDE 15 MG/1
15 CAPSULE ORAL EVERY MORNING
Qty: 30 CAPSULE | Refills: 3 | Status: SHIPPED | OUTPATIENT
Start: 2023-08-23 | End: 2023-08-23

## 2023-08-23 RX ORDER — TOPIRAMATE 25 MG/1
TABLET, FILM COATED ORAL
Qty: 90 TABLET | Refills: 2 | Status: SHIPPED | OUTPATIENT
Start: 2023-08-23 | End: 2024-04-06

## 2023-08-23 ASSESSMENT — PAIN SCALES - GENERAL: PAINLEVEL: NO PAIN (0)

## 2023-08-23 NOTE — PROGRESS NOTES
Virtual Visit Details    Type of service:  Video Visit     Originating Location (pt. Location): Home    Distant Location (provider location):  Off-site  Platform used for Video Visit: [facility] Medical Weight Management Note     Cindy Contreras  MRN:  [id number]  :  1995  ROBERTO:  2023    Dear ABILIO Lima CNP,    I had the pleasure of seeing your patient Cindy Contreras. She is a 27 year old female who I am continuing to see for treatment of obesity related to:        2023     1:56 PM   --   I have the following health issues associated with obesity None of the above   I have the following symptoms associated with obesity Depression    Back Pain    Fatigue       Assessment & Plan   Problem List Items Addressed This Visit          Digestive    Class 1 obesity without serious comorbidity with body mass index (BMI) of 33.0 to 33.9 in adult     Last seen 23. Continued Topiramate. Has lost 31lbs and 17.71% TBW since first seen 6 months ago.     Currently taking Topiramate 75mg. No side effects, no changes in mood. Has been helping with weight maintenace. Has not lost weight in the past 2 months. Would like to start another AOM to help with continued weight loss.     Will start Phentermine 15mg - 1 tablet in the morning. History of bipolar II, previously approved by psychiatrist to start. Currently being followed by PCP for medications. No contraindications. Discussed side effects, risks, and benefits. No hx of HTN or glaucoma. Will continue to monitor mood. Will monirtor blood pressure.          Relevant Medications    topiramate (TOPAMAX) 25 MG tablet    phentermine (ADIPEX-P) 15 MG capsule      Start Phentermine 15mg - take 1 tablet in the morning. Check blood pressure in 1-2 weeks after starting.   Continue Topiramate 75mg at night. Refills sent   Vitamin D labs ordered, to be collected   Follow up with Kate Figueroa in 3 months. Reach out via [employer] in 1 month.     INTERVAL  HISTORY:  New [facility] - 2/21/2023   GLP-1 not covered by insurance   Started Topiramate with approval from psychiatrist   Can start Phentermine in the future if needed - approved by psychiatrist   Insulin Resistance - CHANDNI-IR score 5.8    Last seen 6/14/23 - restarted topiramate, was off for 3 weeks due to increase in depression symptoms     Previously weight stable around 125-130lb. Goal to get back to around this weight.     Anti-obesity medications:     Current:   Topiramate 75mg - taking at night. No side effects. No changes in mood. Unsure if it is helpful with continued weight loss. Has been helpful with weight maintenance.       Recent diet changes: Eating 2 meals a day, with minimal snacking. Drinking water daily, unsure how much. Continues to try and make good choices   Breakfast - skips   Lunch - soup, salad, sushi - eats out   Dinner - Chicken or isaura + vegetable     Recent exercise/activity changes: Has been limited due to increase stress at work. Previously going 2-3x week.     Recent stressors: Increase stress at work - had to plan a big event. Has had some low mood over the past couple of days, ran out of lexapro during that period. Has gotten it refilled. Followed by therapist and psychiatrist     Recent sleep changes: Can fall asleep, will wake up 1-2x a night, but has improved overall.     Vitamins/Labs: Taking Vitamin D 5000IU once daily     CURRENT WEIGHT:   144 lbs 0 oz    Initial Weight (lbs): 175 lbs  Last Visits Weight: 65.3 kg (144 lb)  Cumulative weight loss (lbs): 31  Weight Loss Percentage: 17.71%    Wt Readings from Last 5 Encounters:   08/23/23 65.3 kg (144 lb)   07/25/23 65.3 kg (144 lb)   06/20/23 67.1 kg (148 lb)   06/14/23 64.9 kg (143 lb)   03/13/23 79.4 kg (175 lb)             8/23/2023     9:13 AM   Changes and Difficulties   I have made the following changes to my diet since my last visit: none   With regards to my diet, I am still struggling with: consistency   I have made the  "following changes to my activity/exercise since my last visit: none   With regards to my activity/exercise, I am still struggling with: consistency         MEDICATIONS:   Current Outpatient Medications   Medication Sig Dispense Refill    ARIPiprazole (ABILIFY) 5 MG tablet       Cholecalciferol (VITAMIN D-3) 125 MCG (5000 UT) TABS Take 1 capsule by mouth daily 30 tablet 3    escitalopram (LEXAPRO) 5 MG tablet Take 1 tablet (5 mg) by mouth daily 90 tablet 1    loratadine (CLARITIN) 10 MG tablet Take 10 mg by mouth      multivitamin w/minerals (MULTI-VITAMIN) tablet Take 1 tablet by mouth daily      norgestimate-ethinyl estradiol (ORTHO-CYCLEN) 0.25-35 MG-MCG tablet Take 1 tablet by mouth daily 84 tablet 1    phentermine (ADIPEX-P) 15 MG capsule Take 1 capsule (15 mg) by mouth every morning 30 capsule 3    spironolactone (ALDACTONE) 100 MG tablet Take 1 tablet (100 mg) by mouth daily 90 tablet 3    topiramate (TOPAMAX) 25 MG tablet Take  75mg at bedtime 90 tablet 2           8/23/2023     9:13 AM   Weight Loss Medication History Reviewed With Patient   Which weight loss medications are you currently taking on a regular basis? Topamax (topiramate)   Are you having any side effects from the weight loss medication that we have prescribed you? No           Objective    Ht 1.549 m (5' 1\")   Wt 65.3 kg (144 lb)   LMP 07/12/2023 (Exact Date)   BMI 27.21 kg/m      Vitals - Patient Reported  Pain Score: No Pain (0)      Vitals:  No vitals were obtained today due to virtual visit.    PHYSICAL EXAM:  GENERAL: Healthy, alert and no distress  EYES: Eyes grossly normal to inspection.  No discharge or erythema, or obvious scleral/conjunctival abnormalities.  RESP: No audible wheeze, cough, or visible cyanosis.  No visible retractions or increased work of breathing.    SKIN: Visible skin clear. No significant rash, abnormal pigmentation or lesions.  NEURO: Cranial nerves grossly intact.  Mentation and speech appropriate for " age.  PSYCH: Mentation appears normal, affect normal/bright, judgement and insight intact, normal speech and appearance well-groomed.        Sincerely,    TOMER BAILEY PA-C      34 minutes spent by me on the date of the encounter doing chart review, history and exam, documentation and further activities per the note

## 2023-08-23 NOTE — PATIENT INSTRUCTIONS
"Franklin Manning,   Thank you for allowing us the privilege of caring for you. We hope we provided you with the excellent service you deserve.   Please let us know if there is anything else we can do for you so that we can be sure you are completely satisfied with your care experience.    To ensure the quality of our services you may be receiving a patient satisfaction survey from an independent patient satisfaction monitoring company.    The greatest compliment you can give is a \"Likely to Recommend\"    Your visit was with TOMER BAILEY PA-C today.    Instructions per today's visit:     Start Phentermine 15mg - take 1 tablet in the morning. Check blood pressure in 1-2 weeks after starting.   Continue Topiramate 75mg at night. Refills sent   Vitamin D labs ordered, to be collected   Follow up with Tomer Figueroa in 3 months. Reach out via [employer] in 1 month.    ___________________________________________________________________________  Important contact and scheduling information:  Please call our contact center at [phone] to schedule your next appointments.  For any nursing questions or concerns call Lizett Fernandez LPN at [phone] or Agatha Cardenas RN at [phone]  Please call during clinic hours Monday through Friday 8:00a - 4:00p if you have questions or you can contact us via [employer] at anytime and we will reply during clinic hours.    Lab results will be communicated through My Chart or letter (if My Chart not used). Please call the clinic if you have not received communication after 1 week or if you have any questions.?  Clinic Fax: [phone]  __________________________________________________________________________    If labs were ordered today:    Please make an appointment to have them drawn at your convenience.     To schedule the Lab Appointment using [employer]:  Select \"Schedule an Appointment\"  Select \"Lab Only\"  For \"A couple of questions\", select \"Other\"  For \"Which locations work for you?, select the " location and set up the appointment    To schedule by phone call [phone] to schedule a lab only appointment at any [facility] lab.  ___________________________________________________________________________  Work with A Health !  Virtual Sessions are Available through [facility] Weight Management Clinics    To learn more, call to schedule a free, Health  Q&A appointment: [phone]     What is Health Coaching?  Do you know what you are supposed to do, but you just aren't doing it?  Then, HEALTH COACHING may help you!   Get unstuck and move forward with the support of a professionally trained NBC-HWC (National Board-Certified Health and ) who uses evidence-based approaches to help you move forward with healthy lifestyle changes in the areas of weight loss, stress management and overall well-being.    Health Coaches help you identify goals that will work best for you. Health Coaches provide support and encouragement with overcoming barriers and help you to find inspiration and motivation to lead a healthy lifestyle.    Option one:  Health Coaching 3-Pack; Three, 30-minute Health Coaching Visits, for $99  Visits are done virtually (phone or video)  This is a self pay service; we do not accept insurance for cait coaching.    Option two:   The 24 week Plan; 11 Health Coaching Visits, and a 7 months subscription to [employer]-- on-demand fitness, nutrition and mindfulness classes, for $499 (employee discounts may be available). Participants will also meet regularly with a weight management Medical Provider and a Registered/Licensed Dietician.  This is a self-pay service; we do not accept insurance for health coaching.    To Schedule a free Health  Q&A appointment to learn more,  call [phone].  ____________________________________________________________________    [facility]   Healthy Lifestyle Virtual Support  Group    Healthy Lifestyle Virtual Support Group?  This is 60 minutes of small group guided discussion, support and resources. All are welcome who want a healthy lifestyle.  WHEN: Starting in July 2023, this group meets the 1st Friday of the month from 12:30 PM - 1:30 PM virtually using Microsoft Teams.    FACILITATOR: Led by National Board Certified Health and , Geetha Newberry, [facility].   TO REGISTER: Please send an email to Geetha [email or web address] to receive monthly invites to the group or if you have any questions about having a health .  Prior to the meeting, a link with directions on how to join the meeting will be sent to you.    2023 Meetings  May 19: Let's Talk  June 9: Create Your Coaching Toolkit: Learn How to  Yourself  July 7: Let's Talk  August 4: Benefits of Fiber with ADILIA Booker  September 1: Show and Tell (share your aps, podcasts, recipes, hacks, books)  October 6 :Let's Talk  November 3: Introduction to Mindfulness   December 1: Let's Talk    If you would like bariatric surgery specific support group info please let your care team know.         Thank you,   [facility] Comprehensive Weight Management Team        PHENTERMINE    We are considering starting Phentermine. Take one tablet in the morning.      Phentermine is being prescribed because you identified hunger as one of the main causes for your extra weight.      Our patients on Phentermine find that they:    >feel less hunger    >find it easier to push the plate away   >have an easier time eating less    For some of our patients, these feelings are very real and immediate. For other patients, the feelings are less obvious. They don't feel much of a change but find they've lost weight. Like all weight loss medications, Phentermine  works best when you help it work. This means:  1. Having less tempting high calorie (fattening) food around the house or office. (For people with strong cravings this is very  important.)   2. Staying away from situations or people that may trigger your cravings .   3. Eating out only one time or less each week.  4. Eating your meals at a table with the TV or computer off.    Side-effects. Phentermine is generally well tolerated. The main side-effects we see are feelings of racing pulse or rapid heart beat. Some people can get an elevated blood pressure. Because of this we may have you come back within a week or so of starting the medication for a blood pressure check.        For any questions or concerns please send a REscour message to our team or call our weight management call center at [phone] during regular business hours. For questions during evenings or weekends your messages will be addressed during the next business day.  For emergencies please call 911 or seek immediate medical care.      In order to get refills of this or any medication we prescribe you must be seen in the medical weight mgmt clinic every 2-3 months. Please have your pharmacy fax a refill request to [phone].

## 2023-08-23 NOTE — ASSESSMENT & PLAN NOTE
Last seen 6/14/23. Continued Topiramate. Has lost 31lbs and 17.71% TBW since first seen 6 months ago.     Currently taking Topiramate 75mg. No side effects, no changes in mood. Has been helping with weight maintenace. Has not lost weight in the past 2 months. Would like to start another AOM to help with continued weight loss.     Will start Phentermine 15mg - 1 tablet in the morning. History of bipolar II, previously approved by psychiatrist to start. Currently being followed by PCP for medications. No contraindications. Discussed side effects, risks, and benefits. No hx of HTN or glaucoma. Will continue to monitor mood. Will monirtor blood pressure.

## 2023-08-23 NOTE — NURSING NOTE
Is the patient currently in the state of [place]? YES    Visit mode:VIDEO    If the visit is dropped, the patient can be reconnected by: VIDEO VISIT: Text to cell phone:   Telephone Information:   Mobile [phone]       Will anyone else be joining the visit? NO  (If patient encounters technical issues they should call [phone])    How would you like to obtain your AVS? MyChart    Are changes needed to the allergy or medication list? Yes Pt states taking 2.5mg Albilify daily,     Pt states weight not available today.    Reason for visit: RANDALL HARRIS

## 2023-08-23 NOTE — PROGRESS NOTES
"Virtual Visit Details    Type of service:  Video Visit     Originating Location (pt. Location): {video visit patient location:807321::\"Home\"}  {PROVIDER LOCATION On-site should be selected for visits conducted from your clinic location or adjoining [facility] hospital, academic office, or other nearby [facility] building. Off-site should be selected for all other provider locations, including home:[id number]}  Distant Location (provider location):  {virtual location provider:221534}  Platform used for Video Visit: {Virtual Visit Platforms:444409::\"Ondore\"}    "

## 2023-08-23 NOTE — LETTER
2023       RE: Cindy Contreras  [address]  [facility] [place] [address]     Dear Colleague,    Thank you for referring your patient, Cindy Contreras, to the [facility] [place] at [facility]. Please see a copy of my visit note below.    Virtual Visit Details    Type of service:  Video Visit     Originating Location (pt. Location): Home    Distant Location (provider location):  Off-site  Platform used for Video Visit: [facility] Medical Weight Management Note     Cindy Contreras  MRN:  [id number]  :  1995  ROBERTO:  2023    Dear Siobhan Covington, ABILIO CNP,    I had the pleasure of seeing your patient Cindy Contreras. She is a 27 year old female who I am continuing to see for treatment of obesity related to:        2023     1:56 PM   --   I have the following health issues associated with obesity None of the above   I have the following symptoms associated with obesity Depression    Back Pain    Fatigue       Assessment & Plan  Problem List Items Addressed This Visit          Digestive    Class 1 obesity without serious comorbidity with body mass index (BMI) of 33.0 to 33.9 in adult     Last seen 23. Continued Topiramate. Has lost 31lbs and 17.71% TBW since first seen 6 months ago.     Currently taking Topiramate 75mg. No side effects, no changes in mood. Has been helping with weight maintenace. Has not lost weight in the past 2 months. Would like to start another AOM to help with continued weight loss.     Will start Phentermine 15mg - 1 tablet in the morning. History of bipolar II, previously approved by psychiatrist to start. Currently being followed by PCP for medications. No contraindications. Discussed side effects, risks, and benefits. No hx of HTN or glaucoma. Will continue to monitor mood. Will monirtor blood pressure.          Relevant Medications    topiramate (TOPAMAX) 25 MG tablet     phentermine (ADIPEX-P) 15 MG capsule      Start Phentermine 15mg - take 1 tablet in the morning. Check blood pressure in 1-2 weeks after starting.   Continue Topiramate 75mg at night. Refills sent   Vitamin D labs ordered, to be collected   Follow up with Kate Figueroa in 3 months. Reach out via WriteOn in 1 month.     INTERVAL HISTORY:  New [facility] - 2/21/2023   GLP-1 not covered by insurance   Started Topiramate with approval from psychiatrist   Can start Phentermine in the future if needed - approved by psychiatrist   Insulin Resistance - CHANDNI-IR score 5.8    Last seen 6/14/23 - restarted topiramate, was off for 3 weeks due to increase in depression symptoms     Previously weight stable around 125-130lb. Goal to get back to around this weight.     Anti-obesity medications:     Current:   Topiramate 75mg - taking at night. No side effects. No changes in mood. Unsure if it is helpful with continued weight loss. Has been helpful with weight maintenance.       Recent diet changes: Eating 2 meals a day, with minimal snacking. Drinking water daily, unsure how much. Continues to try and make good choices   Breakfast - skips   Lunch - soup, salad, sushi - eats out   Dinner - Chicken or isaura + vegetable     Recent exercise/activity changes: Has been limited due to increase stress at work. Previously going 2-3x week.     Recent stressors: Increase stress at work - had to plan a big event. Has had some low mood over the past couple of days, ran out of lexapro during that period. Has gotten it refilled. Followed by therapist and psychiatrist     Recent sleep changes: Can fall asleep, will wake up 1-2x a night, but has improved overall.     Vitamins/Labs: Taking Vitamin D 5000IU once daily     CURRENT WEIGHT:   144 lbs 0 oz    Initial Weight (lbs): 175 lbs  Last Visits Weight: 65.3 kg (144 lb)  Cumulative weight loss (lbs): 31  Weight Loss Percentage: 17.71%    Wt Readings from Last 5 Encounters:   08/23/23 65.3 kg (144 lb)  "  07/25/23 65.3 kg (144 lb)   06/20/23 67.1 kg (148 lb)   06/14/23 64.9 kg (143 lb)   03/13/23 79.4 kg (175 lb)             8/23/2023     9:13 AM   Changes and Difficulties   I have made the following changes to my diet since my last visit: none   With regards to my diet, I am still struggling with: consistency   I have made the following changes to my activity/exercise since my last visit: none   With regards to my activity/exercise, I am still struggling with: consistency         MEDICATIONS:   Current Outpatient Medications   Medication Sig Dispense Refill    ARIPiprazole (ABILIFY) 5 MG tablet       Cholecalciferol (VITAMIN D-3) 125 MCG (5000 UT) TABS Take 1 capsule by mouth daily 30 tablet 3    escitalopram (LEXAPRO) 5 MG tablet Take 1 tablet (5 mg) by mouth daily 90 tablet 1    loratadine (CLARITIN) 10 MG tablet Take 10 mg by mouth      multivitamin w/minerals (MULTI-VITAMIN) tablet Take 1 tablet by mouth daily      norgestimate-ethinyl estradiol (ORTHO-CYCLEN) 0.25-35 MG-MCG tablet Take 1 tablet by mouth daily 84 tablet 1    phentermine (ADIPEX-P) 15 MG capsule Take 1 capsule (15 mg) by mouth every morning 30 capsule 3    spironolactone (ALDACTONE) 100 MG tablet Take 1 tablet (100 mg) by mouth daily 90 tablet 3    topiramate (TOPAMAX) 25 MG tablet Take  75mg at bedtime 90 tablet 2           8/23/2023     9:13 AM   Weight Loss Medication History Reviewed With Patient   Which weight loss medications are you currently taking on a regular basis? Topamax (topiramate)   Are you having any side effects from the weight loss medication that we have prescribed you? No           Objective   Ht 1.549 m (5' 1\")   Wt 65.3 kg (144 lb)   LMP 07/12/2023 (Exact Date)   BMI 27.21 kg/m      Vitals - Patient Reported  Pain Score: No Pain (0)      Vitals:  No vitals were obtained today due to virtual visit.    PHYSICAL EXAM:  GENERAL: Healthy, alert and no distress  EYES: Eyes grossly normal to inspection.  No discharge or " erythema, or obvious scleral/conjunctival abnormalities.  RESP: No audible wheeze, cough, or visible cyanosis.  No visible retractions or increased work of breathing.    SKIN: Visible skin clear. No significant rash, abnormal pigmentation or lesions.  NEURO: Cranial nerves grossly intact.  Mentation and speech appropriate for age.  PSYCH: Mentation appears normal, affect normal/bright, judgement and insight intact, normal speech and appearance well-groomed.        Sincerely,    TOMER BAILEY PA-C      34 minutes spent by me on the date of the encounter doing chart review, history and exam, documentation and further activities per the note

## 2023-08-27 ENCOUNTER — HEALTH MAINTENANCE LETTER (OUTPATIENT)
Age: 28
End: 2023-08-27

## 2023-09-14 ENCOUNTER — OFFICE VISIT (OUTPATIENT)
Dept: FAMILY MEDICINE | Facility: CLINIC | Age: 28
End: 2023-09-14
Attending: NURSE PRACTITIONER
Payer: COMMERCIAL

## 2023-09-14 VITALS
HEART RATE: 98 BPM | SYSTOLIC BLOOD PRESSURE: 124 MMHG | RESPIRATION RATE: 20 BRPM | BODY MASS INDEX: 25.92 KG/M2 | WEIGHT: 137.3 LBS | DIASTOLIC BLOOD PRESSURE: 64 MMHG | TEMPERATURE: 98.6 F | HEIGHT: 61 IN | OXYGEN SATURATION: 95 %

## 2023-09-14 DIAGNOSIS — F31.81 BIPOLAR 2 DISORDER (H): ICD-10-CM

## 2023-09-14 DIAGNOSIS — Z13.220 LIPID SCREENING: ICD-10-CM

## 2023-09-14 DIAGNOSIS — G89.29 CHRONIC BILATERAL THORACIC BACK PAIN: ICD-10-CM

## 2023-09-14 DIAGNOSIS — Z13.1 DIABETES MELLITUS SCREENING: ICD-10-CM

## 2023-09-14 DIAGNOSIS — Z00.00 ENCOUNTER FOR ROUTINE HISTORY AND PHYSICAL EXAM IN FEMALE: Primary | ICD-10-CM

## 2023-09-14 DIAGNOSIS — M54.6 CHRONIC BILATERAL THORACIC BACK PAIN: ICD-10-CM

## 2023-09-14 DIAGNOSIS — Z30.09 BIRTH CONTROL COUNSELING: ICD-10-CM

## 2023-09-14 DIAGNOSIS — F33.1 MODERATE EPISODE OF RECURRENT MAJOR DEPRESSIVE DISORDER (H): ICD-10-CM

## 2023-09-14 DIAGNOSIS — L70.0 ACNE VULGARIS: ICD-10-CM

## 2023-09-14 DIAGNOSIS — N62 LARGE BREASTS: ICD-10-CM

## 2023-09-14 LAB
CHOLEST SERPL-MCNC: 170 MG/DL
FASTING STATUS PATIENT QL REPORTED: YES
GLUCOSE SERPL-MCNC: 88 MG/DL (ref 70–99)
HDLC SERPL-MCNC: 43 MG/DL
HGB BLD-MCNC: 14 G/DL (ref 11.7–15.7)
LDLC SERPL CALC-MCNC: 95 MG/DL
NONHDLC SERPL-MCNC: 127 MG/DL
TRIGL SERPL-MCNC: 159 MG/DL

## 2023-09-14 PROCEDURE — 90472 IMMUNIZATION ADMIN EACH ADD: CPT | Performed by: NURSE PRACTITIONER

## 2023-09-14 PROCEDURE — 82947 ASSAY GLUCOSE BLOOD QUANT: CPT | Performed by: NURSE PRACTITIONER

## 2023-09-14 PROCEDURE — 99214 OFFICE O/P EST MOD 30 MIN: CPT | Mod: 25 | Performed by: NURSE PRACTITIONER

## 2023-09-14 PROCEDURE — 90686 IIV4 VACC NO PRSV 0.5 ML IM: CPT | Performed by: NURSE PRACTITIONER

## 2023-09-14 PROCEDURE — 99395 PREV VISIT EST AGE 18-39: CPT | Mod: 25 | Performed by: NURSE PRACTITIONER

## 2023-09-14 PROCEDURE — 90471 IMMUNIZATION ADMIN: CPT | Performed by: NURSE PRACTITIONER

## 2023-09-14 PROCEDURE — 85018 HEMOGLOBIN: CPT | Performed by: NURSE PRACTITIONER

## 2023-09-14 PROCEDURE — 80061 LIPID PANEL: CPT | Performed by: NURSE PRACTITIONER

## 2023-09-14 PROCEDURE — 90677 PCV20 VACCINE IM: CPT | Performed by: NURSE PRACTITIONER

## 2023-09-14 PROCEDURE — 36415 COLL VENOUS BLD VENIPUNCTURE: CPT | Performed by: NURSE PRACTITIONER

## 2023-09-14 RX ORDER — LACTOBACILLUS RHAMNOSUS GG 10B CELL
1 CAPSULE ORAL 2 TIMES DAILY
COMMUNITY

## 2023-09-14 RX ORDER — ESCITALOPRAM OXALATE 5 MG/1
5 TABLET ORAL DAILY
Qty: 90 TABLET | Refills: 0 | Status: SHIPPED | OUTPATIENT
Start: 2023-09-14 | End: 2024-01-24

## 2023-09-14 RX ORDER — SPIRONOLACTONE 100 MG/1
100 TABLET, FILM COATED ORAL DAILY
Qty: 90 TABLET | Refills: 3 | Status: SHIPPED | OUTPATIENT
Start: 2023-09-14

## 2023-09-14 RX ORDER — NORGESTIMATE AND ETHINYL ESTRADIOL 0.25-0.035
1 KIT ORAL DAILY
Qty: 84 TABLET | Refills: 3 | Status: SHIPPED | OUTPATIENT
Start: 2023-09-14 | End: 2024-09-23

## 2023-09-14 ASSESSMENT — ENCOUNTER SYMPTOMS
CONSTIPATION: 0
CHILLS: 0
HEARTBURN: 0
PARESTHESIAS: 0
PALPITATIONS: 0
WEAKNESS: 0
BREAST MASS: 0
DIARRHEA: 0
JOINT SWELLING: 0
COUGH: 0
ABDOMINAL PAIN: 0
EYE PAIN: 0
HEADACHES: 0
SHORTNESS OF BREATH: 1
ARTHRALGIAS: 0
FEVER: 0
DIZZINESS: 0
DYSURIA: 0
HEMATOCHEZIA: 0
SORE THROAT: 0
NAUSEA: 0
NERVOUS/ANXIOUS: 1
FREQUENCY: 0
MYALGIAS: 1
HEMATURIA: 0

## 2023-09-14 ASSESSMENT — ANXIETY QUESTIONNAIRES
7. FEELING AFRAID AS IF SOMETHING AWFUL MIGHT HAPPEN: SEVERAL DAYS
IF YOU CHECKED OFF ANY PROBLEMS ON THIS QUESTIONNAIRE, HOW DIFFICULT HAVE THESE PROBLEMS MADE IT FOR YOU TO DO YOUR WORK, TAKE CARE OF THINGS AT HOME, OR GET ALONG WITH OTHER PEOPLE: SOMEWHAT DIFFICULT
1. FEELING NERVOUS, ANXIOUS, OR ON EDGE: SEVERAL DAYS
GAD7 TOTAL SCORE: 8
5. BEING SO RESTLESS THAT IT IS HARD TO SIT STILL: SEVERAL DAYS
2. NOT BEING ABLE TO STOP OR CONTROL WORRYING: SEVERAL DAYS
6. BECOMING EASILY ANNOYED OR IRRITABLE: MORE THAN HALF THE DAYS
GAD7 TOTAL SCORE: 8
3. WORRYING TOO MUCH ABOUT DIFFERENT THINGS: SEVERAL DAYS
4. TROUBLE RELAXING: SEVERAL DAYS

## 2023-09-14 ASSESSMENT — PAIN SCALES - GENERAL: PAINLEVEL: MODERATE PAIN (4)

## 2023-09-14 ASSESSMENT — PATIENT HEALTH QUESTIONNAIRE - PHQ9
SUM OF ALL RESPONSES TO PHQ QUESTIONS 1-9: 7
10. IF YOU CHECKED OFF ANY PROBLEMS, HOW DIFFICULT HAVE THESE PROBLEMS MADE IT FOR YOU TO DO YOUR WORK, TAKE CARE OF THINGS AT HOME, OR GET ALONG WITH OTHER PEOPLE: SOMEWHAT DIFFICULT
SUM OF ALL RESPONSES TO PHQ QUESTIONS 1-9: 7

## 2023-09-14 NOTE — PROGRESS NOTES
Assessment and Plan:    Encounter for routine history and physical exam in female  Recommend consuming a healthy diet and exercising.  Provided influenza and pneumococcal vaccines.  Discussed smoking cessation options, but she declines.  - Hemoglobin    Diabetes mellitus screening  - Glucose    Lipid screening  - Lipid panel reflex to direct LDL Fasting    Birth control counseling  Renewed OCP.  Educated with medications and side effects include increased risk of blood clots.  - norgestimate-ethinyl estradiol (ORTHO-CYCLEN) 0.25-35 MG-MCG tablet  Dispense: 84 tablet; Refill: 3    Bipolar 2 disorder (H)  Moderate episode of recurrent major depressive disorder (H)  Mood is stable.  She denies thoughts of suicide.  She continues escitalopram and Abilify.  She has a psychiatry appointment in October.  - escitalopram (LEXAPRO) 5 MG tablet  Dispense: 90 tablet; Refill: 0    Acne vulgaris  Patient continues spironolactone.  - spironolactone (ALDACTONE) 100 MG tablet  Dispense: 90 tablet; Refill: 3    Chronic bilateral thoracic back pain  Large breasts  We will refer to plastic surgery for further evaluation and treatment.  I congratulated her on her weight loss.  - Adult Plastic Surgery  Referral      Subjective:     Cindy is a 28 year old female presenting to the clinic for a female physical.     LMP: unsure; runs pill packs together   Hx of abnormal pap smear: none   Last pap smear: 6/20/23 normal   Perform self-breast exams: none   Vaginal discharge or irritation: none   Sexually active: yes, in a relationship for 2 months   Contraception: ocp   Concerns for STDs: none   Previous pregnancies:none     Patient has a history of bipolar and depression.  She was seeing psychiatry and she is now on a wait list.  She is taking Abilify 2.5 mg daily and escitalopram 5 mg daily.  She is tolerating the medication well.  She feels as though her mood is stable.  She denies thoughts of suicide.  Patient is seeing the  weight loss clinic and is taking topiramate and phentermine.  She has lost 50 pounds over the past year.  Patient is not interested in a breast reduction.  She has had thoracic back pain since college.  She describes this as a daily ache.  She has bruising when she runs.  Patient is taking spironolactone for acne.  She was seen the allergist where she was receiving allergy injections.  She has been taking loratadine.    Review of systems:  I performed a 10 point review of systems.  All pertinent positives and negatives are noted in the HPI. All others are negative.     Allergies   Allergen Reactions    Nuts Other (See Comments)     Tree nuts  Other reaction(s): Other (See Comments)  Tree nuts       Current Outpatient Medications   Medication    ARIPiprazole (ABILIFY) 5 MG tablet    Cholecalciferol (VITAMIN D-3) 125 MCG (5000 UT) TABS    escitalopram (LEXAPRO) 5 MG tablet    lactobacillus rhamnosus, GG, (CULTURELL) capsule    loratadine (CLARITIN) 10 MG tablet    multivitamin w/minerals (MULTI-VITAMIN) tablet    norgestimate-ethinyl estradiol (ORTHO-CYCLEN) 0.25-35 MG-MCG tablet    phentermine (ADIPEX-P) 15 MG capsule    spironolactone (ALDACTONE) 100 MG tablet    topiramate (TOPAMAX) 25 MG tablet     No current facility-administered medications for this visit.       Social History     Socioeconomic History    Marital status: Single     Spouse name: Not on file    Number of children: Not on file    Years of education: Not on file    Highest education level: Not on file   Occupational History    Not on file   Tobacco Use    Smoking status: Every Day     Packs/day: 0.50     Years: 5.00     Pack years: 2.50     Types: Cigarettes     Last attempt to quit: 5/1/2020     Years since quitting: 3.3     Passive exposure: Current    Smokeless tobacco: Former   Vaping Use    Vaping Use: Former    Quit date: 8/16/2023    Substances: Nicotine, Flavoring    Devices: Disposable, Pre-filled pod   Substance and Sexual Activity     "Alcohol use: Yes     Alcohol/week: 12.0 standard drinks of alcohol     Types: 12 Cans of beer per week    Drug use: Never    Sexual activity: Yes     Partners: Male     Birth control/protection: Implant     Comment: Nexplanon   Other Topics Concern    Parent/sibling w/ CABG, MI or angioplasty before 65F 55M? No   Social History Narrative    Not on file     Social Determinants of Health     Financial Resource Strain: Not on file   Food Insecurity: Not on file   Transportation Needs: Not on file   Physical Activity: Not on file   Stress: Not on file   Social Connections: Not on file   Intimate Partner Violence: Not on file   Housing Stability: Not on file       Past Medical History:   Diagnosis Date    Depressive disorder 2014       Family History   Problem Relation Age of Onset    Depression Mother     Anxiety Disorder Father     Other Cancer Maternal Grandfather     Diabetes Paternal Grandmother     Anxiety Disorder Sister        No past surgical history on file.    Objective:     /64 (BP Location: Right arm, Patient Position: Sitting, Cuff Size: Adult Regular)   Pulse 98   Temp 98.6  F (37  C) (Temporal)   Resp 20   Ht 1.55 m (5' 1.02\")   Wt 62.3 kg (137 lb 4.8 oz)   LMP 08/10/2023 (Approximate)   SpO2 95%   BMI 25.92 kg/m      Patient is alert, no obvious distress.   Skin: Warm, dry.  No rashes or lesions. Skin turgor rapid return.   HEENT:  Eyes normal.  Ears normal.  Nose patent, mucosa pink.  Oropharynx mucosa pink, no lesions or tonsil enlargement.   Neck:  Supple, without lymphadenopathy, bruits, JVD. Thyroid normal texture and size.    Lungs:  Clear to auscultation.  No wheezing, rales noted.  Respirations even and unlabored.   Heart:  Regular rate and rhythm.  No murmurs.   Breasts:  Normal.  No surrounding adenopathy.   Abdomen: Soft, nontender.  No organomegaly.  Bowel sounds normoactive.  No guarding or masses noted.   :  deferred  Musculoskeletal:  Full ROM of extremities.  Muscle " strength equal +5/5.   Neurological:  Cranial nerves 2-12 intact.            Answers submitted by the patient for this visit:  Patient Health Questionnaire (Submitted on 9/14/2023)  If you checked off any problems, how difficult have these problems made it for you to do your work, take care of things at home, or get along with other people?: Somewhat difficult  PHQ9 TOTAL SCORE: 7  BLANCA-7 (Submitted on 9/14/2023)  BLANCA 7 TOTAL SCORE: 8  Annual Preventive Visit (Submitted on 9/14/2023)  Chief Complaint: Annual Exam:  Frequency of exercise:: 2-3 days/week  Getting at least 3 servings of Calcium per day:: Yes  Diet:: Regular (no restrictions)  Taking medications regularly:: No  Medication side effects:: None  Bi-annual eye exam:: Yes  Dental care twice a year:: Yes  Sleep apnea or symptoms of sleep apnea:: None  abdominal pain: No  Blood in stool: No  Blood in urine: No  chest pain: No  chills: No  congestion: Yes  constipation: No  cough: No  diarrhea: No  dizziness: No  ear pain: No  eye pain: No  nervous/anxious: Yes  fever: No  frequency: No  genital sores: No  headaches: No  hearing loss: No  heartburn: No  arthralgias: No  joint swelling: No  peripheral edema: No  mood changes: Yes  myalgias: Yes  nausea: No  dysuria: No  palpitations: No  Skin sensation changes: No  sore throat: No  urgency: No  rash: No  shortness of breath: Yes  visual disturbance: No  weakness: No  pelvic pain: No  vaginal bleeding: No  vaginal discharge: No  tenderness: No  breast mass: No  breast discharge: No  Additional concerns today:: Yes  Exercise outside of work (Submitted on 9/14/2023)  Chief Complaint: Annual Exam:  Duration of exercise:: 15-30 minutes  Barriers to taking medications (Submitted on 9/14/2023)  Chief Complaint: Annual Exam:  Barriers to taking medications:: Problems remembering to take them

## 2023-09-18 ENCOUNTER — HOSPITAL ENCOUNTER (OUTPATIENT)
Dept: CT IMAGING | Facility: CLINIC | Age: 28
Discharge: HOME | End: 2023-09-18
Attending: OTOLARYNGOLOGY | Admitting: OTOLARYNGOLOGY
Payer: COMMERCIAL

## 2023-09-18 DIAGNOSIS — R09.81 NASAL CONGESTION: ICD-10-CM

## 2023-09-18 DIAGNOSIS — J34.2 DEVIATED NASAL SEPTUM: ICD-10-CM

## 2023-09-18 DIAGNOSIS — R09.81 CONGESTION OF PARANASAL SINUS: ICD-10-CM

## 2023-09-18 PROCEDURE — 70486 CT MAXILLOFACIAL W/O DYE: CPT

## 2023-09-18 NOTE — TELEPHONE ENCOUNTER
FUTURE VISIT INFORMATION      FUTURE VISIT INFORMATION:  Date: 12/19/23  Time: 10:00am  Location: [facility] [place]  REFERRAL INFORMATION:  Referring provider:  Siobhan KNOX CNP  Referring providers clinic:  [facility]  Reason for visit/diagnosis  Chronic bilateral thoracic back pain, Large breasts    RECORDS REQUESTED FROM:       Clinic name Comments Records Status Imaging Status   [facility] Ov/referral 9/14/23 EPIC

## 2023-10-24 ENCOUNTER — VIRTUAL VISIT (OUTPATIENT)
Dept: PSYCHOLOGY | Facility: CLINIC | Age: 28
End: 2023-10-24
Payer: COMMERCIAL

## 2023-10-24 DIAGNOSIS — F31.81 BIPOLAR 2 DISORDER (H): ICD-10-CM

## 2023-10-24 PROCEDURE — 90791 PSYCH DIAGNOSTIC EVALUATION: CPT | Mod: 95

## 2023-10-24 ASSESSMENT — ANXIETY QUESTIONNAIRES
IF YOU CHECKED OFF ANY PROBLEMS ON THIS QUESTIONNAIRE, HOW DIFFICULT HAVE THESE PROBLEMS MADE IT FOR YOU TO DO YOUR WORK, TAKE CARE OF THINGS AT HOME, OR GET ALONG WITH OTHER PEOPLE: SOMEWHAT DIFFICULT
GAD7 TOTAL SCORE: 14
5. BEING SO RESTLESS THAT IT IS HARD TO SIT STILL: MORE THAN HALF THE DAYS
7. FEELING AFRAID AS IF SOMETHING AWFUL MIGHT HAPPEN: SEVERAL DAYS
3. WORRYING TOO MUCH ABOUT DIFFERENT THINGS: MORE THAN HALF THE DAYS
GAD7 TOTAL SCORE: 14
3. WORRYING TOO MUCH ABOUT DIFFERENT THINGS: MORE THAN HALF THE DAYS
6. BECOMING EASILY ANNOYED OR IRRITABLE: NEARLY EVERY DAY
4. TROUBLE RELAXING: MORE THAN HALF THE DAYS
7. FEELING AFRAID AS IF SOMETHING AWFUL MIGHT HAPPEN: SEVERAL DAYS
8. IF YOU CHECKED OFF ANY PROBLEMS, HOW DIFFICULT HAVE THESE MADE IT FOR YOU TO DO YOUR WORK, TAKE CARE OF THINGS AT HOME, OR GET ALONG WITH OTHER PEOPLE?: SOMEWHAT DIFFICULT
6. BECOMING EASILY ANNOYED OR IRRITABLE: NEARLY EVERY DAY
IF YOU CHECKED OFF ANY PROBLEMS ON THIS QUESTIONNAIRE, HOW DIFFICULT HAVE THESE PROBLEMS MADE IT FOR YOU TO DO YOUR WORK, TAKE CARE OF THINGS AT HOME, OR GET ALONG WITH OTHER PEOPLE: SOMEWHAT DIFFICULT
1. FEELING NERVOUS, ANXIOUS, OR ON EDGE: MORE THAN HALF THE DAYS
5. BEING SO RESTLESS THAT IT IS HARD TO SIT STILL: MORE THAN HALF THE DAYS
GAD7 TOTAL SCORE: 14
2. NOT BEING ABLE TO STOP OR CONTROL WORRYING: MORE THAN HALF THE DAYS
4. TROUBLE RELAXING: MORE THAN HALF THE DAYS
1. FEELING NERVOUS, ANXIOUS, OR ON EDGE: MORE THAN HALF THE DAYS
2. NOT BEING ABLE TO STOP OR CONTROL WORRYING: MORE THAN HALF THE DAYS
GAD7 TOTAL SCORE: 14
GAD7 TOTAL SCORE: 14
7. FEELING AFRAID AS IF SOMETHING AWFUL MIGHT HAPPEN: SEVERAL DAYS

## 2023-10-24 ASSESSMENT — COLUMBIA-SUICIDE SEVERITY RATING SCALE - C-SSRS
2. HAVE YOU ACTUALLY HAD ANY THOUGHTS OF KILLING YOURSELF IN THE PAST MONTH?: NO
6. HAVE YOU EVER DONE ANYTHING, STARTED TO DO ANYTHING, OR PREPARED TO DO ANYTHING TO END YOUR LIFE?: NO
1. IN THE PAST MONTH, HAVE YOU WISHED YOU WERE DEAD OR WISHED YOU COULD GO TO SLEEP AND NOT WAKE UP?: NO

## 2023-10-24 ASSESSMENT — PATIENT HEALTH QUESTIONNAIRE - PHQ9
SUM OF ALL RESPONSES TO PHQ QUESTIONS 1-9: 16
10. IF YOU CHECKED OFF ANY PROBLEMS, HOW DIFFICULT HAVE THESE PROBLEMS MADE IT FOR YOU TO DO YOUR WORK, TAKE CARE OF THINGS AT HOME, OR GET ALONG WITH OTHER PEOPLE: SOMEWHAT DIFFICULT
SUM OF ALL RESPONSES TO PHQ QUESTIONS 1-9: 16

## 2023-10-24 NOTE — PROGRESS NOTES
"[facility]      PATIENT'S NAME: Cindy Contreras  PREFERRED NAME: Kerri  PRONOUNS:     she her  MRN: [id number]  : 1995  [address] [address]  [facility]  [id number]  DATE OF SERVICE: 10/24/23  START TIME: 11:00 am  END TIME: 11:55 am  PREFERRED PHONE: [phone]  May we leave a program related message: Yes  SERVICE MODALITY:  Video Visit:      Provider verified identity through the following two step process.  Patient provided:  Patient address    Telemedicine Visit: The patient's condition can be safely assessed and treated via synchronous audio and visual telemedicine encounter.      Reason for Telemedicine Visit: Patient convenience (e.g. access to timely appointments / distance to available provider)    Originating Site (Patient Location): Patient's other car    Distant Site (Provider Location): Provider Remote Setting- Home Office    Consent:  The patient/guardian has verbally consented to: the potential risks and benefits of telemedicine (video visit) versus in person care; bill my insurance or make self-payment for services provided; and responsibility for payment of non-covered services.     Patient would like the video invitation sent by:  My Chart    Mode of Communication:  [facility] Conference [facility]    Distant Location (Provider):  Off-site    As the provider I attest to compliance with applicable laws and regulations related to telemedicine.    UNIVERSAL ADULT Mental Health DIAGNOSTIC ASSESSMENT    Identifying Information:  Patient is a 28 year old,   individual.  Patient was referred for an assessment by self.  Patient attended the session alone.    Chief Complaint:   The reason for seeking services at this time is: \"Bipolar 2, anxiety\".  The problem(s) began a year and a half ago for both diagnoses .     Patient has attempted to resolve these concerns in the past through medication, therapy, work in the mental health " field-have tried DBT and CBT on my own .    Social/Family History:  Patient reported they grew up in [facility]  .  They were raised by biological parents  .  Parents  /  in 2008-9 when patient was about 14.  Patient reported that their childhood was good - mom travelled a lot for work. Dad raised us-2 girls, coached our basketball, softball. Now mom and patient have a great relationship.  Patient described their current relationships with family of origin as good relationships with parents and sister.  Sister is  a year older and has a difficult relationship with dad. Stepmom is supportive-works in mental health    The patient describes their cultural background as .  Cultural influences and impact on patient's life structure, values, norms, and healthcare: Athiest.  Contextual influences on patient's health include: Contextual Factors: Learning Environment Factors mom supported [employer] education financially, getting an SAIMA now .   These factors will be addressed in the Preliminary Treatment plan. Patient identified their preferred language to be English. Patient reported they does not need the assistance of an  or other support involved in therapy.     Patient reported had no significant delays in developmental tasks.   Patient's highest education level was graduate school  .  Patient identified the following learning problems: none reported.  Modifications will not be used to assist communication in therapy.  Patient reports they are  able to understand written materials.    Patient reported the following relationship history 2.5 year relationship that ended in March, 2 month relationship.  Patient's current relationship status is single for a couple weeks.  Patient identified their sexual orientation as bi-sexual.  Patient reported having   child(arsalan). Patient identified parents; siblings; pets; friends as part of their support system.  Patient identified the quality of  these relationships as stable and meaningful,  .      Patient's current living/housing situation involves staying in own home/apartment.  The immediate members of family and household include Alina Contreras, 58,Mother  and they report that housing is stable.    Patient is currently employed fulltime.  Patient reports their finances are obtained through employment. Patient does identify finances as a current stressor.      Patient reported that they have not been involved with the legal system.    Patient does not report being under probation/ parole/ jurisdiction. They are not under any current court jurisdiction. .    Patient's Strengths and Limitations:  Patient identified the following strengths or resources that will help them succeed in treatment: community involvement, exercise routine, friends / good social support, family support, insight, intelligence, positive work environment, and music, volunteering . Things that may interfere with the patient's success in treatment include:  work schedule .     Assessments:  The following assessments were completed by patient for this visit:  PHQ2:       7/5/2022     3:27 PM   PHQ-2 ( 1999 [employer])   Q1: Little interest or pleasure in doing things 3   Q2: Feeling down, depressed or hopeless 3   PHQ-2 Score 6   Q1: Little interest or pleasure in doing things Nearly every day   Q2: Feeling down, depressed or hopeless Nearly every day   PHQ-2 Score 6     PHQ9:       7/5/2022     3:27 PM 8/12/2022    10:45 AM 1/23/2023    12:06 PM 6/20/2023     6:48 AM 7/25/2023     2:51 PM 9/14/2023     9:10 AM 10/24/2023    10:42 AM   PHQ-9 SCORE   PHQ-9 Total Score MyChart 21 (Severe depression) 13 (Moderate depression) 11 (Moderate depression) 18 (Moderately severe depression) 10 (Moderate depression) 7 (Mild depression) 16 (Moderately severe depression)   PHQ-9 Total Score 21 13 11 18 10 7 16     GAD2:       10/24/2023    10:53 AM   BLANCA-2   Feeling nervous, anxious, or on edge 2    2    Not being able to stop or control worrying 2    2   BLANCA-2 Total Score 4    4     GAD7:       7/25/2023     2:51 PM 9/14/2023     9:10 AM 10/24/2023    10:53 AM   BLANCA-7 SCORE   Total Score 8 (mild anxiety) 8 (mild anxiety) 14 (moderate anxiety)   Total Score 8 8 14    14     CAGE-AID:       10/24/2023    10:54 AM   CAGE-AID Total Score   Total Score 0   Total Score MyChart 0 (A total score of 2 or greater is considered clinically significant)     PROMIS 10-Global Health (only subscores and total score):       10/24/2023    10:54 AM   PROMIS-10 Scores Only   Global Mental Health Score 10    10   Global Physical Health Score 15    15   PROMIS TOTAL - SUBSCORES 25    25     [place] Suicide Severity Rating Scale (Lifetime/Recent)      10/24/2023    11:00 AM   [place] Suicide Severity Rating (Lifetime/Recent)   Q1 Wished to be Dead (Past Month) no   Q2 Suicidal Thoughts (Past Month) no   Q6 Suicide Behavior (Lifetime) no       Personal and Family Medical History:  Patient does report a family history of mental health concerns.  Patient reports family history includes Anxiety Disorder in her father and sister; Depression in her mother; Diabetes in her paternal grandmother; Other Cancer in her maternal grandfather..     Patient does report Mental Health Diagnosis and/or Treatment.  Patient Patient reported the following previous diagnoses which include(s): a bipolar disorder; depression .  Patient reported symptoms began around age 17 (depression).  Patient has received mental health services in the past:  therapy; psychiatry  .  Psychiatric Hospitalizations: none . Patient denies a history of civil commitment.  Currently, patient none  receiving other mental health services.  These include  medication through primary care .       Patient has had a physical exam to rule out medical causes for current symptoms.  Date of last physical exam was within the past year. Client was encouraged to follow up with PCP if symptoms  were to develop. The patient has a [facility] Provider, who is named Siobhan Covington.  Patient reports no current medical concerns.  Patient reports pain concerns including back pain.  Patient does not want help addressing pain concerns..   There are not significant appetite / nutritional concerns / weight changes.   Patient does not report a history of head injury / trauma / cognitive impairment.        Current Outpatient Medications:     ARIPiprazole (ABILIFY) 5 MG tablet, , Disp: , Rfl:     Cholecalciferol (VITAMIN D-3) 125 MCG (5000 UT) TABS, Take 1 capsule by mouth daily, Disp: 30 tablet, Rfl: 3    cyclobenzaprine (FLEXERIL) 5 MG tablet, Take 1 tablet (5 mg) by mouth 3 times daily as needed for muscle spasms, Disp: 15 tablet, Rfl: 0    escitalopram (LEXAPRO) 5 MG tablet, Take 1 tablet (5 mg) by mouth daily, Disp: 90 tablet, Rfl: 0    lactobacillus rhamnosus, GG, (CULTURELL) capsule, Take 1 capsule by mouth 2 times daily, Disp: , Rfl:     loratadine (CLARITIN) 10 MG tablet, Take 10 mg by mouth, Disp: , Rfl:     multivitamin w/minerals (MULTI-VITAMIN) tablet, Take 1 tablet by mouth daily, Disp: , Rfl:     norgestimate-ethinyl estradiol (ORTHO-CYCLEN) 0.25-35 MG-MCG tablet, Take 1 tablet by mouth daily, Disp: 84 tablet, Rfl: 3    phentermine (ADIPEX-P) 15 MG capsule, Take 1 capsule (15 mg) by mouth every morning, Disp: 30 capsule, Rfl: 3    spironolactone (ALDACTONE) 100 MG tablet, Take 1 tablet (100 mg) by mouth daily, Disp: 90 tablet, Rfl: 3    topiramate (TOPAMAX) 25 MG tablet, Take  75mg at bedtime, Disp: 90 tablet, Rfl: 2    Medication Adherence:  Patient reports taking.      Patient Allergies:    Allergies   Allergen Reactions    Nuts Other (See Comments)     Tree nuts  Other reaction(s): Other (See Comments)  Tree nuts       Medical History:    Past Medical History:   Diagnosis Date    Depressive disorder 2014         Current Mental Status Exam:   Appearance:  Appropriate    Eye Contact:  Good    Psychomotor:  Normal       Gait / station:  no problem  Attitude / Demeanor: Interested Pleasant  Speech      Rate / Production: Normal/ Responsive      Volume:  Normal  volume      Language:  intact  Mood:   Anxious   Affect:   Appropriate    Thought Content: Clear   Thought Process: Coherent       Associations: No loosening of associations  Insight:   Good   Judgment:  Intact   Orientation:  All  Attention/concentration: Short, Easily Distracted, and concentration attention    Substance Use:  Patient did not report a family history of substance use concerns; see medical history section for details.  Patient has not received chemical dependency treatment in the past.  Patient has not ever been to detox.      Patient is not currently receiving any chemical dependency treatment.           Substance History of use Age of first use Date of last use     Pattern and duration of use (include amounts and frequency)   Alcohol currently use   15 10/21/23 REPORTS SUBSTANCE USE: reports using substance 1 times per week and has 3 cocktails at a time.   Patient reports heaviest use was college.   Cannabis   used in the past 16 09/30/23 REPORTS SUBSTANCE USE: N/A     Amphetamines   never used     REPORTS SUBSTANCE USE: N/A   Cocaine/crack    never used       REPORTS SUBSTANCE USE: N/A   Hallucinogens used in the past   18  01/24/13  REPORTS SUBSTANCE USE: N/A   Inhalants never used         REPORTS SUBSTANCE USE: N/A   Heroin never used         REPORTS SUBSTANCE USE: N/A   Other Opiates never used     REPORTS SUBSTANCE USE: N/A   Benzodiazepine   never used     REPORTS SUBSTANCE USE: N/A   Barbiturates never used     REPORTS SUBSTANCE USE: N/A   Over the counter meds never used     REPORTS SUBSTANCE USE: N/A   Caffeine currently use 17   REPORTS SUBSTANCE USE: reports using substance 1 times per day and has 1 or maybe 2 coffees at a time.   Patient reports heaviest use is current use.   Nicotine  currently use 25 10/24/23  REPORTS SUBSTANCE USE: reports using substance multiple times per day and has vape cartridge last 2 weeks   at a time.   Patient reports heaviest use is current use.   Other substances not listed above:  Identify:  never used     REPORTS SUBSTANCE USE: N/A     Patient reported the following problems as a result of their substance use: no problems, not applicable.    Substance Use: No symptoms    Based on the negative CAGE score and clinical interview there  are not indications of drug or alcohol abuse.    Significant Losses / Trauma / Abuse / Neglect Issues:   Patient did not  serve in the .    Concerns for possible neglect are not present.     Safety Assessment:   Patient denies current homicidal ideation and behaviors.  Patient denies current self-injurious ideation and behaviors.    Patient denied risk behaviors associated with substance use.  Patient denies any high risk behaviors associated with mental health symptoms.  Patient reports the following current concerns for their personal safety: None.  Patient reports there are not firearms in the house.       There are no firearms in the home..    History of Safety Concerns:  Patient denied a history of homicidal ideation.     Patient denied a history of personal safety concerns.    Patient denied a history of assaultive behaviors.    Patient denied a history of sexual assault behaviors.     Patient denied a history of risk behaviors associated with substance use.  Patient denies any history of high risk behaviors associated with mental health symptoms.  Patient reports the following protective factors: dedication to family or friends; access to a variety of clinical interventions and pets    Risk Plan:  See Recommendations for Safety and Risk Management Plan    Review of Symptoms per patient report:   Depression: Change in sleep, Lack of interest, Excessive or inappropriate guilt, Change in energy level, Difficulties concentrating, Change in appetite,  Psychomotor slowing or agitation, Feelings of hopelessness, Feelings of helplessness, Low self-worth, Ruminations, Irritability, Feeling sad, down, or depressed, Withdrawn, Poor hygeine, and Frequent crying  Esme:  Elevated mood, Irritability, Grandiosity, Racing thoughts, Increased activity, Decreased need for sleep, Restlessness, and Distractibility  Psychosis: No Symptoms  past visual hallucinations of bugs , auditory clicking sounds (a year ago)  Anxiety: Excessive worry, Nervousness, Physical complaints, such as headaches, stomachaches, muscle tension, Separation anxiety, Social anxiety, Psychomotor agitation, Ruminations, Poor concentration, and Irritability  Panic:  Palpitations, Tremors, Shortness of breath, Numbness, and rash on hands, depersonalization   Post Traumatic Stress Disorder:  No Symptoms   Eating Disorder: No Symptoms  ADD / ADHD:  Poor task completion and Distractibility  Conduct Disorder: No symptoms  Autism Spectrum Disorder: No symptoms  Obsessive Compulsive Disorder: Checking    Patient reports the following compulsive behaviors and treatment history:  none .      Diagnostic Criteria:   Bipolar II   **Must meet all criteria below for Dx of Bipolar II Disorder**   History of Hypomania, symptoms included:  A. A distinct period of abnormally and persistently elevated, expansive, or irritable mood and abnormally and persistently increased activity or energy lasting at least 4 consecutive days and presentmost of the day, nearly every day.  B. During the period of mood disturbance and increased energy and activity, three (or more) of the following symptoms have persisted (four if the mood is only irritable), represent a nonticeable change from usual behaivor, and have been present to a degree:   - inflated self-esteem or grandiosity    - decreased need for sleep (e.g., feels rested after only 3 hours of sleep)    - more talkative than usual or pressure to keep talking    - flight of ideas or  subjective experience that thoughts are racing   - distractibility   - increase in goal-directed activity  C. The episode is associated with an unequivocal change in functioning that is uncharacteristic of the person when not symptomatic  D. The disturbance in mood and the change in functioning are observable by others  E. The episode is not severe enough to cause marked impairment in social or occupational functioning or to necessitate hospitalization, and does not have psychotic features.  F. The symptoms are not attributable to the direct physiological effects of a substance or a general medical condition    Functional Status:  Patient reports the following functional impairments:  relationship(s) and Intimate and otherwise .     Nonprogrammatic care:  Patient is requesting basic services to address current mental health concerns.    Clinical Summary:  1. Reason for assessment: Bipolar 2, anxiety, depression .  2. Psychosocial, Cultural and Contextual Factors: family hx of mental health concerns, symptoms present in teen years, ineffective treatment  .  3. Principal DSM5 Diagnoses  (Sustained by DSM5 Criteria Listed Above):   296.89 Bipolar II Disorder Depressed.  4. Other Diagnoses that is relevant to services:   300.02 (F41.1) Generalized Anxiety Disorder.  5. Provisional Diagnosis:  296.89 Bipolar II Disorder Depressed as evidenced by ROS .  6. Prognosis: Expect Improvement.  7. Likely consequences of symptoms if not treated: Left untreated, pt may experience an increase in symptom severity, which will likely interfere further with functioning across contexts and may indicate need for a higher level of care.  8. Client strengths include:  educated, employed, intelligent, and support of family, friends and providers .     Recommendations:     1. Plan for Safety and Risk Management:   Safety and Risk: Recommended that patient call 911 or go to the local ED should there be a change in any of these risk factors..           Report to child / adult protection services was NA.     2. Patient's   potential childhood/attachment concerns will be addressed in therapy  .     3. Initial Treatment will focus on:    Mood Instability - regulation and safety planning .     4. Resources/Service Plan:    services are not indicated.   Modifications to assist communication are not indicated.   Additional disability accommodations are not indicated.      5. Collaboration:   Collaboration / coordination of treatment will be initiated with the following  support professionals: primary care physician.      6.  Referrals:   The following referral(s) will be initiated:  none at this time . Next Scheduled Appointment: 10/31/23.      A Release of Information has been obtained for the following:  n/a .     Clinical Substantiation/medical necessity for the above recommendations:  left untreated, patient's condition could indicate a higher level of care.    7. NYASIA:    NYASIA:  Discussed the general effects of drugs and alcohol on health and well-being. Provider gave patient printed information about the  effects of chemical use on their health and well being. Recommendations:  n/a .     8. Records:   These were reviewed at time of assessment.   Information in this assessment was obtained from the medical record and  provided by patient who is a fair historian.    Patient will have open access to their mental health medical record.    9.   Interactive Complexity: No    10. Safety Plan:   Patient committed to safety. Will complete plan in next session    Provider Name/ Credentials:  Marycruz KO LICSW  October 24, 2023

## 2023-10-25 ENCOUNTER — ANCILLARY PROCEDURE (OUTPATIENT)
Dept: GENERAL RADIOLOGY | Facility: CLINIC | Age: 28
End: 2023-10-25
Attending: PHYSICIAN ASSISTANT
Payer: COMMERCIAL

## 2023-10-25 ENCOUNTER — OFFICE VISIT (OUTPATIENT)
Dept: URGENT CARE | Facility: URGENT CARE | Age: 28
End: 2023-10-25
Payer: COMMERCIAL

## 2023-10-25 VITALS
DIASTOLIC BLOOD PRESSURE: 81 MMHG | HEART RATE: 81 BPM | TEMPERATURE: 98.3 F | SYSTOLIC BLOOD PRESSURE: 120 MMHG | WEIGHT: 137 LBS | BODY MASS INDEX: 25.87 KG/M2

## 2023-10-25 DIAGNOSIS — R07.89 CHEST WALL PAIN: ICD-10-CM
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
## 2023-10-25 DIAGNOSIS — M54.2 NECK PAIN: ICD-10-CM
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
## 2023-10-25 PROCEDURE — 72040 X-RAY EXAM NECK SPINE 2-3 VW: CPT | Mod: TC | Performed by: STUDENT IN AN ORGANIZED HEALTH CARE EDUCATION/TRAINING PROGRAM

## 2023-10-25 PROCEDURE — 71046 X-RAY EXAM CHEST 2 VIEWS: CPT | Mod: TC | Performed by: RADIOLOGY

## 2023-10-25 PROCEDURE — 99214 OFFICE O/P EST MOD 30 MIN: CPT | Performed by: PHYSICIAN ASSISTANT

## 2023-10-25 RX ORDER — CYCLOBENZAPRINE HCL 5 MG
5 TABLET ORAL 3 TIMES DAILY PRN
Qty: 15 TABLET | Refills: 0 | Status: SHIPPED | OUTPATIENT
Start: 2023-10-25 | End: 2023-12-19

## 2023-10-25 ASSESSMENT — PAIN SCALES - GENERAL: PAINLEVEL: EXTREME PAIN (8)
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
## 2023-10-25 NOTE — PATIENT INSTRUCTIONS
Take tylenol 1000mg three times per day for pain  Use ice on the area     You can use flexeril for muscle spasms or tightness in your neck, this will cause drowsiness

## 2023-10-31 ENCOUNTER — VIRTUAL VISIT (OUTPATIENT)
Dept: PSYCHOLOGY | Facility: CLINIC | Age: 28
End: 2023-10-31
Payer: COMMERCIAL

## 2023-10-31 DIAGNOSIS — F31.81 BIPOLAR 2 DISORDER (H): Primary | ICD-10-CM

## 2023-10-31 PROCEDURE — 90837 PSYTX W PT 60 MINUTES: CPT | Mod: VID

## 2023-10-31 ASSESSMENT — PATIENT HEALTH QUESTIONNAIRE - PHQ9
SUM OF ALL RESPONSES TO PHQ QUESTIONS 1-9: 16
10. IF YOU CHECKED OFF ANY PROBLEMS, HOW DIFFICULT HAVE THESE PROBLEMS MADE IT FOR YOU TO DO YOUR WORK, TAKE CARE OF THINGS AT HOME, OR GET ALONG WITH OTHER PEOPLE: VERY DIFFICULT
SUM OF ALL RESPONSES TO PHQ QUESTIONS 1-9: 16
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
## 2023-11-15 ENCOUNTER — MYC REFILL (OUTPATIENT)
Dept: FAMILY MEDICINE | Facility: CLINIC | Age: 28
End: 2023-11-15
Payer: COMMERCIAL

## 2023-11-15 DIAGNOSIS — F31.81 BIPOLAR 2 DISORDER (H): Primary | ICD-10-CM

## 2023-11-16 RX ORDER — ARIPIPRAZOLE 5 MG/1
TABLET ORAL
OUTPATIENT
Start: 2023-11-16

## 2023-11-20 DIAGNOSIS — F31.81 BIPOLAR 2 DISORDER (H): Primary | ICD-10-CM

## 2023-11-20 RX ORDER — ARIPIPRAZOLE 5 MG/1
5 TABLET ORAL DAILY
Qty: 30 TABLET | Refills: 1 | Status: SHIPPED | OUTPATIENT
Start: 2023-11-20 | End: 2024-01-24

## 2023-11-27 ENCOUNTER — TELEPHONE (OUTPATIENT)
Dept: PSYCHIATRY | Facility: CLINIC | Age: 28
End: 2023-11-27
Payer: COMMERCIAL

## 2023-11-27 NOTE — TELEPHONE ENCOUNTER
PSYCHIATRY CLINIC PHONE INTAKE     SERVICES REQUESTED / INTERESTED IN          Med Management    Presenting Problem and Brief History                              What would you like to be seen for? (brief description):  Patient diagnosed with Bi-Polar 2 a couple of years ago and is currently being prescribed 2.5mg of abilify and 5mg of lexapro by PCP. Patient would like to discuss increasing medications as they have not been as beneficial for her. Patient states within the past 6 months she has cycled with hypo-angus, SI, and depression and has felt episodic for the past three months.   Have you received a mental health diagnosis? Yes   Which one (s): Depression and BLANCA  Is there any history of developmental delay?  No   Are you currently seeing a mental health provider?  Yes            Who / month last seen:  Therapist, completed one session--just started  Do you have mental health records elsewhere?  Yes [facility] and [facility]   Will you sign a release so we can obtain them?  Yes    Have you ever been hospitalized for psychiatric reasons?  No  Describe:  No    Do you have current thoughts of self-harm?  No    Do you currently have thoughts of harming others?  No    Do you have any safety concerns? No   If yes to these, offer to reach out to a  for follow up.      Substance Use History     Do you have any history of alcohol / illicit drug use?  Yes  Describe:  Not 'abuse'  Have you ever received treatment for this?   N/A     Describe:  N/A     Social History     Who is the patient's a guardian?   Self     Name / number: Self  Have you had an ACT team in last 12 months?  No  Describe: No   OK to leave a detailed voicemail?  Yes    Would you be interested in learning more about research opportunities for which you or your child may qualify? We can connect you with a team member for more information.  No  If yes, send an [employer] message to Arpita Alvarez    Medical/ Surgical History                                    Patient Active Problem List   Diagnosis    Major Depression, Single Episode    Acne    Bipolar 2 disorder (H)    Environmental allergies    Presence of IUD    Smoker    Class 1 obesity without serious comorbidity with body mass index (BMI) of 33.0 to 33.9 in adult          Medications             Have you taken >3 psychiatric medications in your past?  Yes  Do you currently take 5 or more medications, including prescriptions, supplements, and other over the counter products?  No    If YES to at least one of these questions:   As part of your evaluation in our clinic, we have specially trained pharmacists as part of your care team. Your provider would like for you to meet with one of our pharmacists to review your current and past medications, ensure your med list is up to date, and queue up any questions or concerns you have about medications. They will review all of your medications, not just for mental health, to help ensure you know what you re taking and that everything is working together.     Please schedule patient in [facility] (Vaishali Gong or Ritu Diallo) for 60m MTM in any green space as virtual (video), telephone, or in person (designated in person days per Epic templates).  -Appt notes can say  Psych eval on xx/xx   -Route telephone encounter to the pharmacist who will be seeing the patient.  If patient has questions about insurance coverage or billing, please still schedule the visit and refer them to call the [facility] coordinators at [phone].    Current Outpatient Medications   Medication Sig Dispense Refill    ARIPiprazole (ABILIFY) 5 MG tablet Take 1 tablet (5 mg) by mouth daily 30 tablet 1    Cholecalciferol (VITAMIN D-3) 125 MCG (5000 UT) TABS Take 1 capsule by mouth daily 30 tablet 3    cyclobenzaprine (FLEXERIL) 5 MG tablet Take 1 tablet (5 mg) by mouth 3 times daily as needed for muscle spasms 15 tablet 0    escitalopram (LEXAPRO) 5 MG tablet Take 1 tablet (5 mg)  by mouth daily 90 tablet 0    lactobacillus rhamnosus, GG, (CULTURELL) capsule Take 1 capsule by mouth 2 times daily      loratadine (CLARITIN) 10 MG tablet Take 10 mg by mouth      multivitamin w/minerals (MULTI-VITAMIN) tablet Take 1 tablet by mouth daily      norgestimate-ethinyl estradiol (ORTHO-CYCLEN) 0.25-35 MG-MCG tablet Take 1 tablet by mouth daily 84 tablet 3    phentermine (ADIPEX-P) 15 MG capsule Take 1 capsule (15 mg) by mouth every morning 30 capsule 3    spironolactone (ALDACTONE) 100 MG tablet Take 1 tablet (100 mg) by mouth daily 90 tablet 3    topiramate (TOPAMAX) 25 MG tablet Take  75mg at bedtime 90 tablet 2         DISPOSITION      Intake Phone Screen completed on 11/27 at [facility] scheduled with Dr. Reilly Karimi 1/24/24. New patient paperwork emailed to address on file.     Angela Bonilla.

## 2023-11-29 ENCOUNTER — VIRTUAL VISIT (OUTPATIENT)
Dept: PSYCHOLOGY | Facility: CLINIC | Age: 28
End: 2023-11-29
Payer: COMMERCIAL

## 2023-11-29 DIAGNOSIS — F31.81 BIPOLAR 2 DISORDER (H): Primary | ICD-10-CM

## 2023-11-29 PROCEDURE — 90837 PSYTX W PT 60 MINUTES: CPT | Mod: VID
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
## 2023-12-15 NOTE — PROGRESS NOTES
PLASTIC SURGERY HISTORY AND PHYSICAL    Chief Complaint:Hypertrophy of breasts   Referring Provider: Siobhan Covington      HPI:  Kerri Contreras  is a 28 year old female who presents with symptomatic macromastia. She currently wears a 34G, would prefer to be a D. She complains of upper back pain, neck pain, shoulder pain for 4 years. Breasts haven't decreased with 50lb weight loss. Symptoms seem worse since the weight loss. Describes frequent tensions headaches 3x/month which feels related to her back pain. Also get insomnia due to back pain keeping her up. Feels that large breasts affect ability to run/exercise. She has tried the following without successful resolution of pain symptoms: massage, chiropractor, pain medication including ibuprofen, tylenol, wearing multiple bras, buying specialty bras. + family history of breast cancer-maternal grandmother.     Recent mammogram: no  Rashes: no    Highest weight 185, currenlty 135lb, has been stable for 5 months. On phentermine and topiramate. Plans to stay on them long term.     PMH:   Past Medical History:   Diagnosis Date    Depressive disorder 2014   bipolar    PSH:   No past surgical history on file.    FH:   Family History   Problem Relation Age of Onset    Depression Mother     Anxiety Disorder Father     Other Cancer Maternal Grandfather     Diabetes Paternal Grandmother     Anxiety Disorder Sister         SH:   Social History     Tobacco Use    Smoking status: Former     Packs/day: 0.50     Years: 5.00     Additional pack years: 0.00     Total pack years: 2.50     Types: Cigarettes     Quit date: 5/1/2020     Years since quitting: 3.6     Passive exposure: Current    Smokeless tobacco: Former   Vaping Use    Vaping Use: Every day    Last attempt to quit: 8/16/2023    Substances: Nicotine, Flavoring    Devices: Disposable, Pre-filled pod   Substance Use Topics    Alcohol use: Yes     Alcohol/week: 12.0 standard drinks of alcohol     Types: 12 Cans of beer per week     "Drug use: Never   Uses nicotine vape  Social drinker    Work/school: non profit.    MEDS:     Current Outpatient Medications:     ARIPiprazole (ABILIFY) 5 MG tablet, Take 1 tablet (5 mg) by mouth daily, Disp: 30 tablet, Rfl: 1    Cholecalciferol (VITAMIN D-3) 125 MCG (5000 UT) TABS, Take 1 capsule by mouth daily, Disp: 30 tablet, Rfl: 3    escitalopram (LEXAPRO) 5 MG tablet, Take 1 tablet (5 mg) by mouth daily, Disp: 90 tablet, Rfl: 0    lactobacillus rhamnosus, GG, (CULTURELL) capsule, Take 1 capsule by mouth 2 times daily, Disp: , Rfl:     loratadine (CLARITIN) 10 MG tablet, Take 10 mg by mouth, Disp: , Rfl:     multivitamin w/minerals (MULTI-VITAMIN) tablet, Take 1 tablet by mouth daily, Disp: , Rfl:     norgestimate-ethinyl estradiol (ORTHO-CYCLEN) 0.25-35 MG-MCG tablet, Take 1 tablet by mouth daily, Disp: 84 tablet, Rfl: 3    phentermine (ADIPEX-P) 15 MG capsule, Take 1 capsule (15 mg) by mouth every morning, Disp: 30 capsule, Rfl: 3    spironolactone (ALDACTONE) 100 MG tablet, Take 1 tablet (100 mg) by mouth daily, Disp: 90 tablet, Rfl: 3    topiramate (TOPAMAX) 25 MG tablet, Take  75mg at bedtime, Disp: 90 tablet, Rfl: 2       ALLERGIES:     Allergies   Allergen Reactions    Nuts Other (See Comments)     Tree nuts  Other reaction(s): Other (See Comments)  Tree nuts        ROS: Denies chest pain, shortness of breath, MI, CVA, DVT, PE, and bleeding disorders.     PHYSICAL EXAMINATION:   /77 (BP Location: Left arm, Patient Position: Sitting, Cuff Size: Adult Regular)   Pulse 89   Ht 1.549 m (5' 1\")   Wt 62.5 kg (137 lb 11.2 oz)   LMP  (LMP Unknown)   SpO2 99%   BMI 26.02 kg/m     BMI: Body mass index is 26.02 kg/m .  Body surface area is 1.64 meters squared.   General: NAD  Chest: bilateral macromastia with grade II ptosis. +upper pole deflation. Mixed fibrofatty tissue.   +shoulder grooving    330g     ASSESSMENT:  28 year old female PMH depression, bipolar, MO s/p weight loss with MWM, who presents " with symptomatic macromastia desiring breast reduction.     PLAN:   I believe she is a good candidate for a breast reduction. Prior to surgery she will need smoking cessation. According to Schnur scale 330g will need to be removed, this will be approximately 40-50% reduction from her current size. She understands that this amount will need to be removed to be covered by insurance. I think she may be closer to C cup after surgery, which she is ok with.    Risks and benefits of the procedure were discussed, including but not limited to bleeding, infection, scarring, wound healing complications, asymmetry, loss of nipple sensation, nipple necrosis, inability to breast feed and change in size of breasts in the future. She understands the risks and would like to proceed with surgery.    I explained the surgical scheduling process, including picking a surgical date and then our team submitting for prior auth 6 weeks prior to this date.     Will quit nicotine vape, will message when quit and I will place orders. Photos taken today. Will need to stop phentermine pre op.     Liberty Brooks PA-C  Plastic and Reconstructive Surgery     45 minutes spent on the date of the encounter doing chart review, history and physical, documentation and further activity as noted above.

## 2023-12-19 ENCOUNTER — PRE VISIT (OUTPATIENT)
Dept: PLASTIC SURGERY | Facility: CLINIC | Age: 28
End: 2023-12-19

## 2023-12-19 ENCOUNTER — OFFICE VISIT (OUTPATIENT)
Dept: PLASTIC SURGERY | Facility: CLINIC | Age: 28
End: 2023-12-19
Attending: NURSE PRACTITIONER
Payer: COMMERCIAL

## 2023-12-19 VITALS
SYSTOLIC BLOOD PRESSURE: 111 MMHG | BODY MASS INDEX: 26 KG/M2 | HEIGHT: 61 IN | WEIGHT: 137.7 LBS | DIASTOLIC BLOOD PRESSURE: 77 MMHG | HEART RATE: 89 BPM | OXYGEN SATURATION: 99 %

## 2023-12-19 DIAGNOSIS — M54.6 CHRONIC BILATERAL THORACIC BACK PAIN: ICD-10-CM

## 2023-12-19 DIAGNOSIS — G89.29 CHRONIC BILATERAL THORACIC BACK PAIN: ICD-10-CM

## 2023-12-19 DIAGNOSIS — N62 LARGE BREASTS: Primary | ICD-10-CM

## 2023-12-19 PROCEDURE — 99214 OFFICE O/P EST MOD 30 MIN: CPT | Performed by: PHYSICIAN ASSISTANT

## 2023-12-19 ASSESSMENT — PAIN SCALES - GENERAL: PAINLEVEL: NO PAIN (0)

## 2023-12-19 NOTE — NURSING NOTE
"Chief Complaint   Patient presents with    Consult     Breast reduction consult.       Vitals:    12/19/23 0956   BP: 111/77   BP Location: Left arm   Patient Position: Sitting   Cuff Size: Adult Regular   Pulse: 89   SpO2: 99%   Weight: 62.5 kg (137 lb 11.2 oz)   Height: 1.549 m (5' 1\")       Body mass index is 26.02 kg/m .      Davie Lee, EMT    "

## 2023-12-19 NOTE — LETTER
12/19/2023       RE: Cindy Contreras  [address]  [place] [facility] [address]       Dear Colleague,    Thank you for referring your patient, Cindy Contreras, to the [facility] [place] at [facility]. Please see a copy of my visit note below.    PLASTIC SURGERY HISTORY AND PHYSICAL    Chief Complaint:Hypertrophy of breasts   Referring Provider: Siobhan Covington      HPI:  Kerri Contreras  is a 28 year old female who presents with symptomatic macromastia. She currently wears a 34G, would prefer to be a D. She complains of upper back pain, neck pain, shoulder pain for 4 years. Breasts haven't decreased with 50lb weight loss. Symptoms seem worse since the weight loss. Describes frequent tensions headaches 3x/month which feels related to her back pain. Also get insomnia due to back pain keeping her up. Feels that large breasts affect ability to run/exercise. She has tried the following without successful resolution of pain symptoms: massage, chiropractor, pain medication including ibuprofen, tylenol, wearing multiple bras, buying specialty bras. + family history of breast cancer-maternal grandmother.     Recent mammogram: no  Rashes: no    Highest weight 185, currenlty 135lb, has been stable for 5 months. On phentermine and topiramate. Plans to stay on them long term.     PMH:   Past Medical History:   Diagnosis Date    Depressive disorder 2014   bipolar    PSH:   No past surgical history on file.    FH:   Family History   Problem Relation Age of Onset    Depression Mother     Anxiety Disorder Father     Other Cancer Maternal Grandfather     Diabetes Paternal Grandmother     Anxiety Disorder Sister         SH:   Social History     Tobacco Use    Smoking status: Former     Packs/day: 0.50     Years: 5.00     Additional pack years: 0.00     Total pack years: 2.50     Types: Cigarettes     Quit date: 5/1/2020     Years since  "quitting: 3.6     Passive exposure: Current    Smokeless tobacco: Former   Vaping Use    Vaping Use: Every day    Last attempt to quit: 8/16/2023    Substances: Nicotine, Flavoring    Devices: Disposable, Pre-filled pod   Substance Use Topics    Alcohol use: Yes     Alcohol/week: 12.0 standard drinks of alcohol     Types: 12 Cans of beer per week    Drug use: Never   Uses nicotine vape  Social drinker    Work/school: non profit.    MEDS:     Current Outpatient Medications:     ARIPiprazole (ABILIFY) 5 MG tablet, Take 1 tablet (5 mg) by mouth daily, Disp: 30 tablet, Rfl: 1    Cholecalciferol (VITAMIN D-3) 125 MCG (5000 UT) TABS, Take 1 capsule by mouth daily, Disp: 30 tablet, Rfl: 3    escitalopram (LEXAPRO) 5 MG tablet, Take 1 tablet (5 mg) by mouth daily, Disp: 90 tablet, Rfl: 0    lactobacillus rhamnosus, GG, (CULTURELL) capsule, Take 1 capsule by mouth 2 times daily, Disp: , Rfl:     loratadine (CLARITIN) 10 MG tablet, Take 10 mg by mouth, Disp: , Rfl:     multivitamin w/minerals (MULTI-VITAMIN) tablet, Take 1 tablet by mouth daily, Disp: , Rfl:     norgestimate-ethinyl estradiol (ORTHO-CYCLEN) 0.25-35 MG-MCG tablet, Take 1 tablet by mouth daily, Disp: 84 tablet, Rfl: 3    phentermine (ADIPEX-P) 15 MG capsule, Take 1 capsule (15 mg) by mouth every morning, Disp: 30 capsule, Rfl: 3    spironolactone (ALDACTONE) 100 MG tablet, Take 1 tablet (100 mg) by mouth daily, Disp: 90 tablet, Rfl: 3    topiramate (TOPAMAX) 25 MG tablet, Take  75mg at bedtime, Disp: 90 tablet, Rfl: 2       ALLERGIES:     Allergies   Allergen Reactions    Nuts Other (See Comments)     Tree nuts  Other reaction(s): Other (See Comments)  Tree nuts        ROS: Denies chest pain, shortness of breath, MI, CVA, DVT, PE, and bleeding disorders.     PHYSICAL EXAMINATION:   /77 (BP Location: Left arm, Patient Position: Sitting, Cuff Size: Adult Regular)   Pulse 89   Ht 1.549 m (5' 1\")   Wt 62.5 kg (137 lb 11.2 oz)   LMP  (LMP Unknown)   SpO2 " 99%   BMI 26.02 kg/m     BMI: Body mass index is 26.02 kg/m .  Body surface area is 1.64 meters squared.   General: NAD  Chest: bilateral macromastia with grade II ptosis. +upper pole deflation. Mixed fibrofatty tissue.   +shoulder grooving    330g     ASSESSMENT:  28 year old female PMH depression, bipolar, MO s/p weight loss with MWM, who presents with symptomatic macromastia desiring breast reduction.     PLAN:   I believe she is a good candidate for a breast reduction. Prior to surgery she will need smoking cessation. According to Schnur scale 330g will need to be removed, this will be approximately 40-50% reduction from her current size. She understands that this amount will need to be removed to be covered by insurance. I think she may be closer to C cup after surgery, which she is ok with.    Risks and benefits of the procedure were discussed, including but not limited to bleeding, infection, scarring, wound healing complications, asymmetry, loss of nipple sensation, nipple necrosis, inability to breast feed and change in size of breasts in the future. She understands the risks and would like to proceed with surgery.    I explained the surgical scheduling process, including picking a surgical date and then our team submitting for prior auth 6 weeks prior to this date.     Will quit nicotine vape, will message when quit and I will place orders. Photos taken today. Will need to stop phentermine pre op.        45 minutes spent on the date of the encounter doing chart review, history and physical, documentation and further activity as noted above.        Again, thank you for allowing me to participate in the care of your patient.      Sincerely,    Liberty Brooks PA-C

## 2023-12-20 ENCOUNTER — LAB (OUTPATIENT)
Dept: LAB | Facility: CLINIC | Age: 28
End: 2023-12-20
Payer: COMMERCIAL

## 2023-12-20 ENCOUNTER — VIRTUAL VISIT (OUTPATIENT)
Dept: FAMILY MEDICINE | Facility: CLINIC | Age: 28
End: 2023-12-20
Payer: COMMERCIAL

## 2023-12-20 DIAGNOSIS — J02.9 SORE THROAT: ICD-10-CM

## 2023-12-20 DIAGNOSIS — E55.9 VITAMIN D DEFICIENCY: ICD-10-CM

## 2023-12-20 DIAGNOSIS — J02.9 SORE THROAT: Primary | ICD-10-CM

## 2023-12-20 LAB
DEPRECATED S PYO AG THROAT QL EIA: POSITIVE
MONOCYTES NFR BLD AUTO: NEGATIVE %

## 2023-12-20 PROCEDURE — 36415 COLL VENOUS BLD VENIPUNCTURE: CPT

## 2023-12-20 PROCEDURE — 99213 OFFICE O/P EST LOW 20 MIN: CPT | Mod: VID | Performed by: FAMILY MEDICINE

## 2023-12-20 PROCEDURE — 87880 STREP A ASSAY W/OPTIC: CPT

## 2023-12-20 PROCEDURE — 82306 VITAMIN D 25 HYDROXY: CPT

## 2023-12-20 PROCEDURE — 83970 ASSAY OF PARATHORMONE: CPT

## 2023-12-20 PROCEDURE — 86308 HETEROPHILE ANTIBODY SCREEN: CPT

## 2023-12-20 RX ORDER — AMOXICILLIN 875 MG
875 TABLET ORAL 2 TIMES DAILY
Qty: 20 TABLET | Refills: 0 | Status: SHIPPED | OUTPATIENT
Start: 2023-12-20 | End: 2023-12-30

## 2023-12-20 ASSESSMENT — ENCOUNTER SYMPTOMS: SORE THROAT: 1

## 2023-12-20 ASSESSMENT — PATIENT HEALTH QUESTIONNAIRE - PHQ9
SUM OF ALL RESPONSES TO PHQ QUESTIONS 1-9: 6
SUM OF ALL RESPONSES TO PHQ QUESTIONS 1-9: 6
10. IF YOU CHECKED OFF ANY PROBLEMS, HOW DIFFICULT HAVE THESE PROBLEMS MADE IT FOR YOU TO DO YOUR WORK, TAKE CARE OF THINGS AT HOME, OR GET ALONG WITH OTHER PEOPLE: NOT DIFFICULT AT ALL

## 2023-12-20 NOTE — PROGRESS NOTES
"Kerri is a 28 year old who is being evaluated via a billable video visit.      How would you like to obtain your AVS? MyChart  If the video visit is dropped, the invitation should be resent by: Text to cell phone: [phone]  Will anyone else be joining your video visit? No          Assessment & Plan     Sore throat  Differential diagnosis discussed included infectious disease like strep, mononucleosis etc., she will head out to the nearest clinic, had a strep and mono done, she will be notified of the result, in the meantime continue symptomatic care, gargle with salt and water etc.  Follow-up with PCP if not improving in a week.  - Streptococcus A Rapid Screen w/Reflex to PCR - Clinic Collect; Future  - Mononucleosis screen; Future  Addendum: Strep test came back positive, amoxicillin was sent take as directed, follow-up with PCP if not improving.  Review of external notes as documented elsewhere in note  13 minutes spent by me on the date of the encounter doing chart review, review of outside records, review of test results, interpretation of tests, patient visit, and documentation        BMI:   Estimated body mass index is 26.02 kg/m  as calculated from the following:    Height as of 12/19/23: 1.549 m (5' 1\").    Weight as of 12/19/23: 62.5 kg (137 lb 11.2 oz).       This note was completed in part using a voice recognition software, any grammatical or context distortion are unintentional and inherent to the software.      MD FENG [facility]    Og Manning is a 28 year old, presenting for the following health issues:  Pharyngitis (White spots in the back of throat. Symptoms started since Monday.) and Light headache      12/20/2023    12:53 PM   Additional Questions   Roomed by Jennifer TONEY       Pharyngitis     History of Present Illness       Reason for visit:  Sore thraot  Symptom onset:  1-3 days ago  Symptom intensity:  Severe  Symptom progression:  Worsening  Had these " "symptoms before:  Yes  Has tried/received treatment for these symptoms:  Yes  Previous treatment was successful:  Yes  Prior treatment description:  Antibioty  What makes it worse:  None  What makes it better:  Not really    She eats 2-3 servings of fruits and vegetables daily.She consumes 0 sweetened beverage(s) daily.She exercises with enough effort to increase her heart rate 30 to 60 minutes per day.  She exercises with enough effort to increase her heart rate 3 or less days per week. She is missing 2 dose(s) of medications per week.  She is not taking prescribed medications regularly due to remembering to take.     She started getting sick last Monday with sore throat, sensation of swollen glands in the neck area, fatigue, possible exposure at work.  Member sick with some type of transmissible disease.She had a strep in the past, started antibiotic.      Review of Systems   HENT:  Positive for sore throat.       Constitutional, HEENT, cardiovascular, pulmonary, gi and gu systems are negative, except as otherwise noted.      Objective    Vitals - Patient Reported  Weight (Patient Reported): 61.2 kg (135 lb)  Height (Patient Reported): 154.9 cm (5' 1\")  BMI (Based on Pt Reported Ht/Wt): 25.51        Physical Exam   GENERAL: Healthy, alert and no distress  EYES: Eyes grossly normal to inspection.  No discharge or erythema, or obvious scleral/conjunctival abnormalities.  RESP: No audible wheeze, cough, or visible cyanosis.  No visible retractions or increased work of breathing.    SKIN: Visible skin clear. No significant rash, abnormal pigmentation or lesions.  NEURO: Cranial nerves grossly intact.  Mentation and speech appropriate for age.  PSYCH: Mentation appears normal, affect normal/bright, judgement and insight intact, normal speech and appearance well-groomed.    No results found for any visits on 12/20/23.    This note was completed in part using a voice recognition software, any grammatical or context " distortion are unintentional and inherent to the software.          Video-Visit Details    Type of service:  Video Visit     Originating Location (pt. Location): Home    Distant Location (provider location):  On-site  Platform used for Video Visit: [employer]

## 2023-12-21 LAB
PTH-INTACT SERPL-MCNC: 21 PG/ML (ref 15–65)
VIT D+METAB SERPL-MCNC: 44 NG/ML (ref 20–50)

## 2024-01-02 ENCOUNTER — VIRTUAL VISIT (OUTPATIENT)
Dept: PSYCHOLOGY | Facility: CLINIC | Age: 29
End: 2024-01-02
Payer: COMMERCIAL

## 2024-01-02 ENCOUNTER — TELEPHONE (OUTPATIENT)
Dept: PLASTIC SURGERY | Facility: CLINIC | Age: 29
End: 2024-01-02
Payer: COMMERCIAL

## 2024-01-02 DIAGNOSIS — F31.81 BIPOLAR 2 DISORDER (H): Primary | ICD-10-CM

## 2024-01-02 PROCEDURE — 90837 PSYTX W PT 60 MINUTES: CPT | Mod: 95

## 2024-01-02 NOTE — TELEPHONE ENCOUNTER
Patient called in and LVM on surgery schedulers line. She noted she has quit smoking and would like to schedule surgery. No orders placed. Will route to clinic.    Jennifer Duran on 1/2/2024 at 4:11 PM

## 2024-01-02 NOTE — PROGRESS NOTES
[facility]                                     Progress Note    Patient Name: Cindy Contreras  Date: 1/2/24         Service Type: Individual      Session Start Time: 8:00 am  Session End Time: 8:55 am     Session Length: 55 min    Session #: 4    Attendees: Client attended alone    Service Modality:  Video Visit:      Provider verified identity through the following two step process.  Patient provided:  Patient is known previously to provider    Telemedicine Visit: The patient's condition can be safely assessed and treated via synchronous audio and visual telemedicine encounter.      Reason for Telemedicine Visit: Patient convenience (e.g. access to timely appointments / distance to available provider)    Originating Site (Patient Location): Patient's home    Distant Site (Provider Location): [facility] [place] [facility]    Consent:  The patient/guardian has verbally consented to: the potential risks and benefits of telemedicine (video visit) versus in person care; bill my insurance or make self-payment for services provided; and responsibility for payment of non-covered services.     Patient would like the video invitation sent by:  My Chart    Mode of Communication:  Video Conference via [facility]    Distant Location (Provider):  Off-site    As the provider I attest to compliance with applicable laws and regulations related to telemedicine.    DATA  Interactive Complexity: No  Crisis: No  Extended Session (53+ minutes): PROLONGED SERVICE IN THE OUTPATIENT SETTING REQUIRING DIRECT (FACE-TO-FACE) PATIENT CONTACT BEYOND THE USUAL SERVICE:    - Longer session due to limited access to mental health appointments and necessity to address patient's distress / complexity  Interactive Complexity: No  Crisis: No        Progress Since Last Session (Related to Symptoms / Goals / Homework):   Symptoms: No change      Homework: Partially completed      Episode of Care  Goals: Minimal progress - CONTEMPLATION (Considering change and yet undecided); Intervened by assessing the negative and positive thinking (ambivalence) about behavior change     Current / Ongoing Stressors and Concerns:  Planning to move to [place] in June to be nearer to mom and sister. Some dating, not satisfying. Thinking of past relationship. Preparing for return to school.     Treatment Objective(s) Addressed in This Session:   Reinforce  safety plan   Current stressor processing   Identify/Understand values     Intervention:   Interpersonal Therapy: provided active listening and validation, utilized rapport building  Explored interest in move to [place]. Recent relationship learning/insight, need for values identification to support decision-making.     Assessments completed prior to visit:  The following assessments were completed by patient for this visit:  PHQ2:       7/5/2022     3:27 PM   PHQ-2 ( 1999 [employer])   Q1: Little interest or pleasure in doing things 3   Q2: Feeling down, depressed or hopeless 3   PHQ-2 Score 6   Q1: Little interest or pleasure in doing things Nearly every day   Q2: Feeling down, depressed or hopeless Nearly every day   PHQ-2 Score 6     PHQ9:       6/20/2023     6:48 AM 7/25/2023     2:51 PM 9/14/2023     9:10 AM 10/24/2023    10:42 AM 10/31/2023     7:47 AM 12/20/2023    12:47 PM 1/2/2024     8:01 AM   PHQ-9 SCORE   PHQ-9 Total Score MyChart 18 (Moderately severe depression) 10 (Moderate depression) 7 (Mild depression) 16 (Moderately severe depression) 16 (Moderately severe depression) 6 (Mild depression) 10 (Moderate depression)   PHQ-9 Total Score 18 10 7 16 16 6 10     GAD2:       10/24/2023    10:53 AM   BLANCA-2   Feeling nervous, anxious, or on edge 2   Not being able to stop or control worrying 2   BLANCA-2 Total Score 4    4     GAD7:       7/25/2023     2:51 PM 9/14/2023     9:10 AM 10/24/2023    10:53 AM   BLANCA-7 SCORE   Total Score 8 (mild anxiety) 8 (mild anxiety) 14  (moderate anxiety)   Total Score 8 8 14    14     CAGE-AID:       10/24/2023    10:54 AM   CAGE-AID Total Score   Total Score 0   Total Score MyChart 0 (A total score of 2 or greater is considered clinically significant)     PROMIS 10-Global Health (all questions and answers displayed):       10/24/2023    10:54 AM   PROMIS 10   In general, would you say your health is: Good   In general, would you say your quality of life is: Good   In general, how would you rate your physical health? Good   In general, how would you rate your mental health, including your mood and your ability to think? Fair   In general, how would you rate your satisfaction with your social activities and relationships? Good   In general, please rate how well you carry out your usual social activities and roles Good   To what extent are you able to carry out your everyday physical activities such as walking, climbing stairs, carrying groceries, or moving a chair? Completely   In the past 7 days, how often have you been bothered by emotional problems such as feeling anxious, depressed, or irritable? Often   In the past 7 days, how would you rate your fatigue on average? Moderate   In the past 7 days, how would you rate your pain on average, where 0 means no pain, and 10 means worst imaginable pain? 3   In general, would you say your health is: 3   In general, would you say your quality of life is: 3   In general, how would you rate your physical health? 3   In general, how would you rate your mental health, including your mood and your ability to think? 2   In general, how would you rate your satisfaction with your social activities and relationships? 3   In general, please rate how well you carry out your usual social activities and roles. (This includes activities at home, at work and in your community, and responsibilities as a parent, child, spouse, employee, friend, etc.) 3   To what extent are you able to carry out your everyday physical  activities such as walking, climbing stairs, carrying groceries, or moving a chair? 5   In the past 7 days, how often have you been bothered by emotional problems such as feeling anxious, depressed, or irritable? 4   In the past 7 days, how would you rate your fatigue on average? 3   In the past 7 days, how would you rate your pain on average, where 0 means no pain, and 10 means worst imaginable pain? 3   Global Mental Health Score 10    10   Global Physical Health Score 15    15   PROMIS TOTAL - SUBSCORES 25    25     Mayo Suicide Severity Rating Scale (Lifetime/Recent)      10/24/2023    11:00 AM   Mayo Suicide Severity Rating (Lifetime/Recent)   Q1 Wished to be Dead (Past Month) no   Q2 Suicidal Thoughts (Past Month) no   Q6 Suicide Behavior (Lifetime) no         ASSESSMENT: Current Emotional / Mental Status (status of significant symptoms):   Risk status (Self / Other harm or suicidal ideation)   Patient denies current fears or concerns for personal safety.   Patient reports the following current or recent suicidal ideation or behaviors: passive SI.   Patient denies current or recent homicidal ideation or behaviors.   Patient reports current or recent self injurious behavior or ideation including passive Ideation.   Patient denies other safety concerns.   Patient reports there has been no change in risk factors since their last session.     Patient reports there has been no change in protective factors since their last session.     A safety and risk management plan has been developed including: Patient consented to co-developed safety plan on 10/31/23.  Safety and risk management plan was reviewed.   Patient agreed to use safety plan should any safety concerns arise.  A copy was made available to the patient.     Appearance:   Appropriate    Eye Contact:   Good    Psychomotor Behavior: Normal    Attitude:   Cooperative    Orientation:   All   Speech    Rate / Production: Normal     Volume:  Normal     Mood:    Depressed    Affect:    Blunted  Tearful   Thought Content:  Clear  Rumination    Thought Form:  Circumstantial   Insight:    Fair      Medication Review:   No changes to current psychiatric medication(s)     Medication Compliance:   Yes     Changes in Health Issues:   None reported     Chemical Use Review:   Substance Use: Chemical use reviewed, no active concerns identified      Tobacco Use: Yes, some vaping, starting to quit to prepare for surgery.  Patient reports frequency of use daily. Does not need support.     Diagnosis:  1. Bipolar 2 disorder (H)      Collateral Reports Completed:   Not Applicable    PLAN: (Patient Tasks / Therapist Tasks / Other)  Use safety plan, use resources to move forward (accountability, emotional intelligence, intuition, loving kindness.) Complete values exercise    Marycruz Degroot, [facility]  1/2/2024    _________________________________________________________________                                            Individual Treatment Plan    Patient's Name: Cindy Contreras  YOB: 1995    Date of Creation: 1/2/24  Date Treatment Plan Last Reviewed/Revised:     DSM5 Diagnoses: 296.89 Bipolar II Disorder With seasonal pattern  Psychosocial / Contextual Factors:   PROMIS (reviewed every 90 days): 24 (10/25/23)    Referral / Collaboration:  Referral to another professional/service is not indicated at this time..    Anticipated number of session for this episode of care: 6-9 sessions  Anticipation frequency of session: Every other week  Anticipated Duration of each session: 53 or more minutes  Treatment plan will be reviewed in 90 days or when goals have been changed.     MeasurableTreatment Goal(s) related to diagnosis / functional impairment(s)  Goal 1: Patient will learn to reduce rumination, practice thought stopping to improve emotion regulation to improve focus and healthy behaviors relationships    I will know I've met my goal when tbc.      Objective #A  (Patient Action)    Patient will  Use created safety plan when sx present, share with others .  Status: New - Date: 1/2/24      Intervention(s)  Therapist will teach  safety plan skills .    Objective #B  Patient will  identify values and put them into practice for daily living/decision-making .  Status:  New - Date: 1/2/24  Intervention(s)  Therapist will assign homework of values practice and process/integrate  identified values into therapy goals .    Objective #C    Patient will identify and practice at least 5 strategies for more effectively managing Bipolar Disorder.  Patient will learn and practice grounding, DBT skills including distress tolerance, emotion regulation and mindfulness  Patient will identify and practice structure in sleep, ADLs, exercise and other wellness activities  Status: New - Date: 1/2/24      Intervention(s)  Therapist will teach skills and assign homework .    Patient has reviewed and agreed to the above plan.      Marycruz Degroot, [facility]  January 2, 2024                [facility]                                       Cindy Contreras     SAFETY PLAN:  Step 1: Warning signs / cues (Thoughts, images, mood, situation, behavior) that a crisis may be developing:  Thoughts:  I'm a failure, I'm alone, Nobody cares about me  Images: flashbacks and visions of harm: past memories of cutting  Thinking Processes: ruminations (can't stop thinking about my problems): relationships  Mood: worsening depression, hopelessness, and mood swings  Behaviors: isolating/withdrawing , can't stop crying, not taking care of myself, not taking care of my responsibilities, sleeping too much, not sleeping enough, and increasing frequency and duration of dissociation  Situations: relationship problems, financial stress, and tough situations (ie car accident)    Step 2: Coping strategies - Things I can do to take my mind off of my problems without contacting another person (relaxation technique,  "physical activity):  Distress Tolerance Strategies:  play with my pet , listen to positive and upbeat music: play guitar, sensory based activities/self-soothe with five senses: essential oils, watch a funny movie: podcast-H3, read a book:  , change body temperature (ice pack/cold water) , paced breathing/progressive muscle relaxation, intense exercise: gym across the street  for 2-3 minutes , and play guitar  Physical Activities: go for a walk  Focus on helpful thoughts:  \"I will get through this\", \"Ride the wave\", remind myself of what is important to me: dog, family, friends, and my people are here for me  Step 3: People and social settings that provide distraction:   Mom, best friend Agatha, sister Angela   coffee shop, park, library, gym , and work   Step 4: Remind myself of people and things that are important to me and worth living for:  family, friends, dog  Step 5: When I am in crisis, I can ask these people to help me use my safety plan:  Agatha, mom, kom,   Step 6: Making the environment safe:   be around others  Step 7: Professionals or agencies I can contact during a crisis:  Suicide Prevention Lifeline: Call or Text 988   Local Crisis Services:  [facility], Warm Line    Call 911 or go to my nearest emergency department.   I helped develop this safety plan and agree to use it when needed.  I have been given a copy of this plan.      Client signature _________________________________________________________________  Today s date:  10/31/2023  Completed by Provider Name/ Credentials:  Marycruz Degroot [facility]  October 31, 2023  Adapted from Safety Plan Template 2008 Nelsy Cervantes and Igor Glasgow is reprinted with the express permission of the authors.  No portion of the Safety Plan Template may be reproduced without the express, written permission.  You can contact the authors at [email or web address] or [email or web address].        "

## 2024-01-03 DIAGNOSIS — N62 MACROMASTIA: Primary | ICD-10-CM

## 2024-01-03 NOTE — TELEPHONE ENCOUNTER
Called and spoke with the patient to schedule surgery. Surgery scheduled for 4/4 at [facility] with Dr. Pollock.    H&P with PCP Dr. Covington within 30 days of the surgery date. Patient verbalized understanding H&P is needed prior to surgery.    Pre-op consult with Dr. Pollock scheduled for 3/13.    Post-op scheduled for 4/16 with [facility] in [place].    Patient has questions for the finance team and asked for writer to send contact information to her for the finance team. This writer will send a message to the finance team to please reach out to patient. Patient also would like a [employer] message with the call back number.    Writer will mail surgery packet.    No further questions/concerns at this time.    Jennifer Duran on 1/3/2024 at 8:56 AM

## 2024-01-15 ASSESSMENT — PATIENT HEALTH QUESTIONNAIRE - PHQ9
SUM OF ALL RESPONSES TO PHQ QUESTIONS 1-9: 16
SUM OF ALL RESPONSES TO PHQ QUESTIONS 1-9: 16
10. IF YOU CHECKED OFF ANY PROBLEMS, HOW DIFFICULT HAVE THESE PROBLEMS MADE IT FOR YOU TO DO YOUR WORK, TAKE CARE OF THINGS AT HOME, OR GET ALONG WITH OTHER PEOPLE: SOMEWHAT DIFFICULT

## 2024-01-16 ENCOUNTER — VIRTUAL VISIT (OUTPATIENT)
Dept: PSYCHOLOGY | Facility: CLINIC | Age: 29
End: 2024-01-16
Payer: COMMERCIAL

## 2024-01-16 DIAGNOSIS — F31.81 BIPOLAR 2 DISORDER (H): Primary | ICD-10-CM

## 2024-01-16 PROCEDURE — 90834 PSYTX W PT 45 MINUTES: CPT | Mod: 95

## 2024-01-16 NOTE — PROGRESS NOTES
[facility]                                     Progress Note    Patient Name: Cindy Contreras  Date: 1/16/24         Service Type: Individual      Session Start Time: 8:00 am  Session End Time: 8:45 am     Session Length: 45 min    Session #: 5    Attendees: Client attended alone    Service Modality:  Video Visit:      Provider verified identity through the following two step process.  Patient provided:  Patient is known previously to provider    Telemedicine Visit: The patient's condition can be safely assessed and treated via synchronous audio and visual telemedicine encounter.      Reason for Telemedicine Visit: Patient convenience (e.g. access to timely appointments / distance to available provider)    Originating Site (Patient Location): Patient's home    Distant Site (Provider Location): [facility] [place] [facility]    Consent:  The patient/guardian has verbally consented to: the potential risks and benefits of telemedicine (video visit) versus in person care; bill my insurance or make self-payment for services provided; and responsibility for payment of non-covered services.     Patient would like the video invitation sent by:  My Chart    Mode of Communication:  Video Conference via [facility]    Distant Location (Provider):  Off-site    As the provider I attest to compliance with applicable laws and regulations related to telemedicine.    DATA  Interactive Complexity: No  Crisis: No  Extended Session (53+ minutes): PROLONGED SERVICE IN THE OUTPATIENT SETTING REQUIRING DIRECT (FACE-TO-FACE) PATIENT CONTACT BEYOND THE USUAL SERVICE:    - Longer session due to limited access to mental health appointments and necessity to address patient's distress / complexity  Interactive Complexity: No  Crisis: No        Progress Since Last Session (Related to Symptoms / Goals / Homework):   Symptoms: No change      Homework: Partially completed      Episode of Care  Goals: Minimal progress - CONTEMPLATION (Considering change and yet undecided); Intervened by assessing the negative and positive thinking (ambivalence) about behavior change     Current / Ongoing Stressors and Concerns:  Self harm-cutting last Friday after contact with ex while drinking. Current hypomanic state, little sleep for the past week. Planning to move to [place] in June to be nearer to mom and sister. Some dating, not satisfying. Thinking of past relationship. Preparing for return to school.     Treatment Objective(s) Addressed in This Session:   Reinforce  safety plan   Current stressor processing   Identify/Understand values     Intervention:   Interpersonal Therapy: provided active listening and validation, utilized rapport building  Identified precipitating event and additional behaviors exacerbating self harm. Surfaced help seeking behaviors, validated removal of razor blades from home, connecting with friend for support and use of boundaries to support safety. Set intention to create structured daily plan   Assessments completed prior to visit:  The following assessments were completed by patient for this visit:  PHQ2:       7/5/2022     3:27 PM   PHQ-2 ( 1999 [employer])   Q1: Little interest or pleasure in doing things 3   Q2: Feeling down, depressed or hopeless 3   PHQ-2 Score 6   Q1: Little interest or pleasure in doing things Nearly every day   Q2: Feeling down, depressed or hopeless Nearly every day   PHQ-2 Score 6     PHQ9:       7/25/2023     2:51 PM 9/14/2023     9:10 AM 10/24/2023    10:42 AM 10/31/2023     7:47 AM 12/20/2023    12:47 PM 1/2/2024     8:01 AM 1/15/2024     9:23 PM   PHQ-9 SCORE   PHQ-9 Total Score MyChart 10 (Moderate depression) 7 (Mild depression) 16 (Moderately severe depression) 16 (Moderately severe depression) 6 (Mild depression) 10 (Moderate depression) 16 (Moderately severe depression)   PHQ-9 Total Score 10 7 16 16 6 10 16     GAD2:       10/24/2023    10:53 AM    BLANCA-2   Feeling nervous, anxious, or on edge 2   Not being able to stop or control worrying 2   BLANCA-2 Total Score 4    4     GAD7:       7/25/2023     2:51 PM 9/14/2023     9:10 AM 10/24/2023    10:53 AM   BLANCA-7 SCORE   Total Score 8 (mild anxiety) 8 (mild anxiety) 14 (moderate anxiety)   Total Score 8 8 14    14     CAGE-AID:       10/24/2023    10:54 AM   CAGE-AID Total Score   Total Score 0   Total Score MyChart 0 (A total score of 2 or greater is considered clinically significant)     PROMIS 10-Global Health (all questions and answers displayed):       10/24/2023    10:54 AM   PROMIS 10   In general, would you say your health is: Good   In general, would you say your quality of life is: Good   In general, how would you rate your physical health? Good   In general, how would you rate your mental health, including your mood and your ability to think? Fair   In general, how would you rate your satisfaction with your social activities and relationships? Good   In general, please rate how well you carry out your usual social activities and roles Good   To what extent are you able to carry out your everyday physical activities such as walking, climbing stairs, carrying groceries, or moving a chair? Completely   In the past 7 days, how often have you been bothered by emotional problems such as feeling anxious, depressed, or irritable? Often   In the past 7 days, how would you rate your fatigue on average? Moderate   In the past 7 days, how would you rate your pain on average, where 0 means no pain, and 10 means worst imaginable pain? 3   In general, would you say your health is: 3   In general, would you say your quality of life is: 3   In general, how would you rate your physical health? 3   In general, how would you rate your mental health, including your mood and your ability to think? 2   In general, how would you rate your satisfaction with your social activities and relationships? 3   In general, please rate  how well you carry out your usual social activities and roles. (This includes activities at home, at work and in your community, and responsibilities as a parent, child, spouse, employee, friend, etc.) 3   To what extent are you able to carry out your everyday physical activities such as walking, climbing stairs, carrying groceries, or moving a chair? 5   In the past 7 days, how often have you been bothered by emotional problems such as feeling anxious, depressed, or irritable? 4   In the past 7 days, how would you rate your fatigue on average? 3   In the past 7 days, how would you rate your pain on average, where 0 means no pain, and 10 means worst imaginable pain? 3   Global Mental Health Score 10    10   Global Physical Health Score 15    15   PROMIS TOTAL - SUBSCORES 25    25     [facility] Suicide Severity Rating Scale (Lifetime/Recent)      10/24/2023    11:00 AM   [facility] Suicide Severity Rating (Lifetime/Recent)   Q1 Wished to be Dead (Past Month) no   Q2 Suicidal Thoughts (Past Month) no   Q6 Suicide Behavior (Lifetime) no         ASSESSMENT: Current Emotional / Mental Status (status of significant symptoms):   Risk status (Self / Other harm or suicidal ideation)   Patient denies current fears or concerns for personal safety.   Patient reports the following current or recent suicidal ideation or behaviors: passive SI.   Patient denies current or recent homicidal ideation or behaviors.   Patient reports current or recent self injurious behavior or ideation including passive Ideation.   Patient denies other safety concerns.   Patient reports there has been no change in risk factors since their last session.     Patient reports there has been no change in protective factors since their last session.     A safety and risk management plan has been developed including: Patient consented to co-developed safety plan on 10/31/23.  Safety and risk management plan was reviewed.   Patient agreed to use safety plan  should any safety concerns arise.  A copy was made available to the patient.     Appearance:   Appropriate    Eye Contact:   Good    Psychomotor Behavior: Normal    Attitude:   Cooperative    Orientation:   All   Speech    Rate / Production: Normal     Volume:  Normal    Mood:    Depressed    Affect:    Blunted    Thought Content:  Clear  Rumination    Thought Form:  Circumstantial   Insight:    Fair      Medication Review:   No changes to current psychiatric medication(s)     Medication Compliance:   Yes     Changes in Health Issues:   None reported     Chemical Use Review:   Substance Use: increase in alcohol .  Patient reports frequency of use at home, now stopped again following self harm (drank 2 bottles of wine alone over the week).  Provided encouragement towards sobriety        Tobacco Use: Yes, some vaping, starting to quit to prepare for surgery.  Patient reports frequency of use daily. Does not need support.     Diagnosis:  1. Bipolar 2 disorder (H)      Collateral Reports Completed:   Not Applicable    PLAN: (Patient Tasks / Therapist Tasks / Other)  Use safety plan, use resources to move forward (accountability, emotional intelligence, intuition, loving kindness.) Complete values exercise, schedule     Marycruz Degroot, [facility]  1/16/2024    _________________________________________________________________                                            Individual Treatment Plan    Patient's Name: Cindy Contreras  YOB: 1995    Date of Creation: 1/2/24  Date Treatment Plan Last Reviewed/Revised:     DSM5 Diagnoses: 296.89 Bipolar II Disorder With seasonal pattern  Psychosocial / Contextual Factors:   PROMIS (reviewed every 90 days): 24 (10/25/23)    Referral / Collaboration:  Referral to another professional/service is not indicated at this time..    Anticipated number of session for this episode of care: 6-9 sessions  Anticipation frequency of session: Every other week  Anticipated Duration of  each session: 53 or more minutes  Treatment plan will be reviewed in 90 days or when goals have been changed.     MeasurableTreatment Goal(s) related to diagnosis / functional impairment(s)  Goal 1: Patient will learn to reduce rumination, practice thought stopping to improve emotion regulation to improve focus and healthy behaviors relationships    I will know I've met my goal when tbc.      Objective #A (Patient Action)    Patient will  Use created safety plan when sx present, share with others .  Status: New - Date: 1/2/24      Intervention(s)  Therapist will teach  safety plan skills .    Objective #B  Patient will  identify values and put them into practice for daily living/decision-making .  Status:  New - Date: 1/2/24  Intervention(s)  Therapist will assign homework of values practice and process/integrate  identified values into therapy goals .    Objective #C    Patient will identify and practice at least 5 strategies for more effectively managing Bipolar Disorder.  Patient will learn and practice grounding, DBT skills including distress tolerance, emotion regulation and mindfulness  Patient will identify and practice structure in sleep, ADLs, exercise and other wellness activities  Status: New - Date: 1/2/24      Intervention(s)  Therapist will teach skills and assign homework .    Patient has reviewed and agreed to the above plan.      Marycruz Degroot, [facility]  January 2, 2024                [facility]     SAFETY PLAN:  Step 1: Warning signs / cues (Thoughts, images, mood, situation, behavior) that a crisis may be developing:  Thoughts:  I'm a failure, I'm alone, Nobody cares about me  Images: flashbacks and visions of harm: past memories of cutting  Thinking Processes: ruminations (can't stop thinking about my problems): relationships  Mood: worsening depression, hopelessness, and mood swings  Behaviors: isolating/withdrawing , can't  "stop crying, not taking care of myself, not taking care of my responsibilities, sleeping too much, not sleeping enough, and increasing frequency and duration of dissociation  Situations: relationship problems, financial stress, and tough situations (ie car accident)    Step 2: Coping strategies - Things I can do to take my mind off of my problems without contacting another person (relaxation technique, physical activity):  Distress Tolerance Strategies:  play with my pet , listen to positive and upbeat music: play guitar, sensory based activities/self-soothe with five senses: essential oils, watch a funny movie: podcast-H3, read a book:  , change body temperature (ice pack/cold water) , paced breathing/progressive muscle relaxation, intense exercise: gym across the street  for 2-3 minutes , and play guitar  Physical Activities: go for a walk  Focus on helpful thoughts:  \"I will get through this\", \"Ride the wave\", remind myself of what is important to me: dog, family, friends, and my people are here for me  Step 3: People and social settings that provide distraction:   Mom, best friend Agatha, sister Angela   coffee shop, park, library, gym , and work   Step 4: Remind myself of people and things that are important to me and worth living for:  family, friends, dog  Step 5: When I am in crisis, I can ask these people to help me use my safety plan:  Agatha, mom, stepmom,   Step 6: Making the environment safe:   be around others  Step 7: Professionals or agencies I can contact during a crisis:  Suicide Prevention Lifeline: Call or Text 988   Local Crisis Services:  [facility] Line    Call 911 or go to my nearest emergency department.   I helped develop this safety plan and agree to use it when needed.  I have been given a copy of this plan.      Client signature _________________________________________________________________  Today s date:  10/31/2023  Completed by Provider Name/ Credentials:  Marycruz Degroot" MSW LICSW  October 31, 2023  Adapted from Safety Plan Template 2008 Nelsy Cervantes and Igor Glasgow is reprinted with the express permission of the authors.  No portion of the Safety Plan Template may be reproduced without the express, written permission.  You can contact the authors at [email or web address] or [email or web address].

## 2024-01-24 ENCOUNTER — OFFICE VISIT (OUTPATIENT)
Dept: PSYCHIATRY | Facility: CLINIC | Age: 29
End: 2024-01-24
Attending: PSYCHIATRY & NEUROLOGY
Payer: COMMERCIAL

## 2024-01-24 VITALS
BODY MASS INDEX: 26.02 KG/M2 | DIASTOLIC BLOOD PRESSURE: 73 MMHG | SYSTOLIC BLOOD PRESSURE: 115 MMHG | TEMPERATURE: 98.5 F | HEIGHT: 61 IN | HEART RATE: 87 BPM

## 2024-01-24 DIAGNOSIS — F31.81 BIPOLAR 2 DISORDER (H): Primary | ICD-10-CM

## 2024-01-24 PROBLEM — Z97.5 PRESENCE OF IUD: Status: RESOLVED | Noted: 2019-07-12 | Resolved: 2024-01-24

## 2024-01-24 PROCEDURE — 90792 PSYCH DIAG EVAL W/MED SRVCS: CPT | Mod: GC

## 2024-01-24 ASSESSMENT — ANXIETY QUESTIONNAIRES
7. FEELING AFRAID AS IF SOMETHING AWFUL MIGHT HAPPEN: SEVERAL DAYS
7. FEELING AFRAID AS IF SOMETHING AWFUL MIGHT HAPPEN: SEVERAL DAYS
6. BECOMING EASILY ANNOYED OR IRRITABLE: NEARLY EVERY DAY
4. TROUBLE RELAXING: MORE THAN HALF THE DAYS
3. WORRYING TOO MUCH ABOUT DIFFERENT THINGS: MORE THAN HALF THE DAYS
1. FEELING NERVOUS, ANXIOUS, OR ON EDGE: MORE THAN HALF THE DAYS
4. TROUBLE RELAXING: MORE THAN HALF THE DAYS
2. NOT BEING ABLE TO STOP OR CONTROL WORRYING: MORE THAN HALF THE DAYS
8. IF YOU CHECKED OFF ANY PROBLEMS, HOW DIFFICULT HAVE THESE MADE IT FOR YOU TO DO YOUR WORK, TAKE CARE OF THINGS AT HOME, OR GET ALONG WITH OTHER PEOPLE?: SOMEWHAT DIFFICULT
3. WORRYING TOO MUCH ABOUT DIFFERENT THINGS: MORE THAN HALF THE DAYS
7. FEELING AFRAID AS IF SOMETHING AWFUL MIGHT HAPPEN: SEVERAL DAYS
1. FEELING NERVOUS, ANXIOUS, OR ON EDGE: MORE THAN HALF THE DAYS
5. BEING SO RESTLESS THAT IT IS HARD TO SIT STILL: MORE THAN HALF THE DAYS
GAD7 TOTAL SCORE: 14
8. IF YOU CHECKED OFF ANY PROBLEMS, HOW DIFFICULT HAVE THESE MADE IT FOR YOU TO DO YOUR WORK, TAKE CARE OF THINGS AT HOME, OR GET ALONG WITH OTHER PEOPLE?: SOMEWHAT DIFFICULT
7. FEELING AFRAID AS IF SOMETHING AWFUL MIGHT HAPPEN: SEVERAL DAYS
GAD7 TOTAL SCORE: 14
5. BEING SO RESTLESS THAT IT IS HARD TO SIT STILL: MORE THAN HALF THE DAYS
GAD7 TOTAL SCORE: 14
IF YOU CHECKED OFF ANY PROBLEMS ON THIS QUESTIONNAIRE, HOW DIFFICULT HAVE THESE PROBLEMS MADE IT FOR YOU TO DO YOUR WORK, TAKE CARE OF THINGS AT HOME, OR GET ALONG WITH OTHER PEOPLE: SOMEWHAT DIFFICULT
2. NOT BEING ABLE TO STOP OR CONTROL WORRYING: MORE THAN HALF THE DAYS
GAD7 TOTAL SCORE: 14
IF YOU CHECKED OFF ANY PROBLEMS ON THIS QUESTIONNAIRE, HOW DIFFICULT HAVE THESE PROBLEMS MADE IT FOR YOU TO DO YOUR WORK, TAKE CARE OF THINGS AT HOME, OR GET ALONG WITH OTHER PEOPLE: SOMEWHAT DIFFICULT
6. BECOMING EASILY ANNOYED OR IRRITABLE: NEARLY EVERY DAY

## 2024-01-24 ASSESSMENT — PATIENT HEALTH QUESTIONNAIRE - PHQ9
SUM OF ALL RESPONSES TO PHQ QUESTIONS 1-9: 13
10. IF YOU CHECKED OFF ANY PROBLEMS, HOW DIFFICULT HAVE THESE PROBLEMS MADE IT FOR YOU TO DO YOUR WORK, TAKE CARE OF THINGS AT HOME, OR GET ALONG WITH OTHER PEOPLE: SOMEWHAT DIFFICULT
SUM OF ALL RESPONSES TO PHQ QUESTIONS 1-9: 13

## 2024-01-24 ASSESSMENT — PAIN SCALES - GENERAL: PAINLEVEL: NO PAIN (0)

## 2024-01-24 NOTE — PROGRESS NOTES
"   [facility]  NEW PATIENT EVALUATION     CARE TEAM:    PCP- Siobhan Covington  Therapist- Marycruz Mikayla Manning is a 28 year old who uses the pronouns she, her, hers.      Chief Concern     New patient evaluation     Diagnoses     Bipolar affective disorder, type II  Borderline personality disorder     Assessment     Cindy Contreras is a 28 year old with a past psychiatric history of bipolar 2 disorder and generalized anxiety disorder who was seen for a new patient evaluation in the outpatient psychiatry clinic. At present, Kerri reports episodic symptoms that appear cyclic in nature and consistent with episodes of both hypomania and depression, with depressive symptoms, in general, being more prominent. Periods of hypomania are characterized by increased goal-directed activity, increased energy, and some increases in impulsivity, but not to such a degree as to require hospitalization; no history of psychosis during these periods. Depressive periods include depressed mood, low energy, low motivation, and social withdrawal/isolation. This pattern of symptoms does appear to be consistent with a diagnosis of bipolar affective disorder, type II.    The diagnostic picture is, however, somewhat complicated by the presence of emotional dysregulation highly tied to interpersonal conflict, history of SIB, intermittent SI without plan or intent, and a chronic feeling of \"emptiness\". This constellation of symptoms would be consistent with a diagnosis of borderline personality disorder, which in some cases may mirror symptoms of BPADII. That said, from the history provided it appears that while these cluster B traits have a significant impact on Kerri's overall mental health, they are best characterized as a distinct diagnostic consideration from BPAD, rather than the underlying etiology of those symptoms.  This is supported by reported history of a trial " of sertraline resulting in one of these episodes being triggered more acutely.    Finally, while in the past she experienced patterns of anxiety that were generalized across situations and out of proportion to the situations in which they occur and thus fit a diagnosis of generalized anxiety disorder, these symptoms have not been of marked concern at present. Thus this diagnosis was deferred at this time.    Given prominent concerns around emotional dysregulation, will trial a titration of lamotrigine to target mood regulation and mood stabilization. See below for plan.    Future Considerations:  If lamotrigine effective for mood regulation, may consider discontinuing Abilify  DBT    Psychotropic Drug Interactions:  [PSYCHCLINICDDI]  none  Management: N/A    MNPMP was not checked today: not using controlled substances    Risk Statements:   Treatment Risk- Risks, benefits, alternatives and potential adverse effects have been discussed and are understood.   Safety Risk-Nasimy did not appear to be an imminent safety risk to self or others.     Plan     1) Medications:   - Continue Abilify 2.5mg daily  - Continue escitalopram 5mg daily  - Start lamotrigine 25mg for 14 days, then increase to 50mg per day    2) Psychotherapy: Continue with individual psychotherapist    3) Next due:  Labs- SGA monitoring labs due   EKG- N/A   Rating scales- AIMS: Determine next due    4) Referrals: MTM- for monitoring of lamotrigine between available follow-up  none    5) Other: none    6) Follow-up: Return to clinic in 4 weeks on BCT with next available provider. MTM referral to help bridge between provider availability.     Pertinent Background                                                   [most recent eval 01/24/24]     Kerri first experienced depression and anxiety as a teenager. During her early 20's began to experience periods of increased energy with heightened goal-oriented behavior and increased impulsivity associated with  "episodes of hypomania. These have been relatively well-controlled with Lexapro and Abilify after an initial trial of sertraline triggered an episode of hypomania. Does have a history of chronic SI and prior SIB with a recent recurrence of cutting in 2024. Symptoms of dysregulation, SI, SIB, rejection sensitivity, and inner feeling of emptiness consistent with diagnosis of borderline personality disorder.    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 But has struggled with symptoms since she was 17, primarily depression and anxiety up until that point. Hypomania started around early to mid 20's.    Initially started with online psychiatrist, but with changes in insurance had to change platforms, PCP took over med management.    Has been seeing a therapist for a long time, current therapist is somewhat newer.    Has moved around \"a lot\", [facility], back to [place]. Was supposed to do [employer], but then COVID hit. Moving to [place] later this year, probably August.    In school for [employer] with certificate in non-profit management currently.    Top priorities at present are managing hypomania better. Last was about a week ago and lasted for about a week. Only sleeping about 2 hours at a time, more irritable, hyper productive and out of character. Had an episode of self-harm around that time. That was the first episode of SIB in about 10 years. Racing thoughts are a key feature of hypomania; feeling that her internal dialogue is \"screaming\"; feels debilitating like she can't get things done meaningfully. Trouble expressing herself when feeling like that. Also has components of insomnia with ruminative thinking.    Prior to starting to taking medications would experience tapping and buzzing sounds, seeing " "\"creepy crawly\" things in the corner of her vision. None since starting medications. Prior psychiatrist mentioned she had traits of schizophrenia. During that time would also have feeling of depersonalization/derealization, felt like her thoughts didn't make sense or like she didn't feel \"real\" or her body felt numb. Feeling of \"dissociating from everything\". That dissociating feeling hasn't happened in a long while.    Depression more often than the hypomania, which is with more hypersomnia than insomnia. Key features are low interest, low motivation, isolating, oversleeping, depressed mood, feeling of ambivalence/apathy, as well as increases in suicidal ideation. Depression episodes are so frequent it is hard to delineate when one phase has started, tends to be a \"slip\" back into depression, lasting up to a few months.    SI is predominantly related to relationships, feels that she is very reactive to relationships, particularly romantic ones. Has wondered if the symptoms might reflect BPD.     For anxiety, strongly tied to social situations and has improved over the years. Public speaking, certain social situations were the primary triggers. Overall feels better since being on medications. Would get stress rashes on hands and chest, racing heart race, racing thoughts, difficulty regulating emotions in situations that prompted anxiety, ruminating. During college social anxiety as particularly bad, would stay in her dorm room for days to avoid interacting with others.     Initially started on a monotherapy of Zoloft, elicited a mixed state.    Didn't notice any changes to symptoms with starting phentermine.       Recent Psych Symptoms:   Depression:  depressed mood, anhedonia, low energy, poor concentration /memory, and indecisiveness  Elevated:  increased energy, increased activity, distractibility , racing thoughts, and mood dysregulation  Psychosis:  none  Anxiety:  excessive worry  Trauma Related:  " none  Insomnia:  No  Other:  No    Current Social History:  Financial/occupational: Manager at [employer]  Living situation (partner, children, pets, etc): Alone in an apartment  Social/spiritual support: Friends, family  Feels safe at home: Yes    Pertinent Substance Use:   Alcohol: Yes: 2x per month out socially, otherwise 1-2x per week glass of wine   Cannabis: Yes: occasional, but makes her anxious so avoids - 2x per year  Tobacco: Yes: nicotine gum, trying to quit vaping  Caffeine:  Yes: 2x cups coffee per day   Opioids: No   Other substances: none    Medical Review of Systems:   Lightheadedness/orthostasis: Intermittent, thinks it may be related to spironolactone  Headaches: Tension headaches, some association with chronic back pain  GI: none  Sexual health concerns: None    A comprehensive review of systems was performed and is negative other than noted above.    Contraception: Yes: OCPs     Mental Status Exam     Alertness: alert   Appearance: well groomed  Behavior/Demeanor: cooperative, pleasant, and calm, with good  eye contact   Speech: regular rate and rhythm  Language: intact and no problems  Psychomotor: normal or unremarkable  Mood: description consistent with euthymia  Affect: full range; congruent to: mood- yes, content- yes  Thought Process/Associations: unremarkable  Thought Content:  Reports none;  Denies suicidal & violent ideation and delusions  Perception:  Reports none;  Denies hallucinations  Insight: good  Judgment: good  Cognition: does  appear grossly intact; formal cognitive testing was not done  Gait and Station: unremarkable     Social History                                 pt reported     Per Previous Intake Assessment  The patient describes their cultural background as .  Cultural influences and impact on patient's life structure, values, norms, and healthcare: Atheist.  Contextual influences on patient's health include: Contextual Factors: Learning Environment Factors  mom supported college education financially, getting an [employer] now . These factors will be addressed in the Preliminary Treatment plan. Patient identified their preferred language to be English. Patient reported they does not need the assistance of an  or other support involved in therapy.   Patient reported they grew up in [facility]  .  They were raised by biological parents  .  Parents  /  in 2008-9 when patient was about 14.  Patient reported that their childhood was good - mom travelled a lot for work. Dad raised us-2 girls, coached our basketball, softball. Now mom and patient have a great relationship.  Patient described their current relationships with family of origin as good relationships with parents and sister.  Sister is  a year older and has a difficult relationship with dad. Stepmom is supportive-works in mental health      Family Mental Health History                                 pt reported     Patient does report a family history of mental health concerns.  Patient reports family history includes Anxiety Disorder in her father and sister; Depression in her mother; Diabetes in her paternal grandmother; Other Cancer in her maternal grandfather..      Past Psychiatric History     Self injurious behavior [method, most recent]: Yes: History of cutting, most recent 1x in 2024 after none for 10 years  Suicide attempt [#, most recent, method]: Yes: As a teenager took a handful of medications, didn't require hospitalization  Suicidal ideation hx [passive, active]: Yes: Passive ideation, no history of plan or intent    Violence/Aggression Hx:No   Psychosis Hx: Non-specific experience of seeing shadowy shapes out of the corners of her eyes  Eating Disorder Hx: No  Trauma hx: Yes: isolated traumatic events, but no chronic sources of trauma    Psych Hosp [#, most recent]: No   Commitment: No   TMS/ECT: No   Outpatient Programs [Day treatment, DBT, eating disorder tx, etc]:  "Individual therapy only    SUBSTANCE USE HISTORY   Past Use: Yes: alcohol and cannabis; had a period of drinking to intoxication 4-5 days per week about 2 years ago, but has markedly decreased use. Never used alcohol during day hours and did not suffer any consequences or deleterious effects of use.     Past Psych Med Trials      Medication Max Dose (mg) Dates / Duration Helpful? DC Reason / Adverse Effects?   Zoloft    Triggered hypomania   Abilify 2.5mg  Y    Lexapro 5mg  Y    Lamotrigine  01/2024 - current        Vitals   /73 (BP Location: Left arm, Patient Position: Sitting, Cuff Size: Adult Regular)   Pulse 87   Temp 98.5  F (36.9  C) (Oral)   Ht 1.549 m (5' 1\")   LMP  (LMP Unknown)   BMI 26.02 kg/m    Pulse Readings from Last 3 Encounters:   01/24/24 87   12/19/23 89   10/25/23 81     Wt Readings from Last 3 Encounters:   12/19/23 62.5 kg (137 lb 11.2 oz)   10/25/23 62.1 kg (137 lb)   09/14/23 62.3 kg (137 lb 4.8 oz)     BP Readings from Last 3 Encounters:   01/24/24 115/73   12/19/23 111/77   10/25/23 120/81        Medical History     ALLERGIES: Nuts    Patient Active Problem List   Diagnosis    Acne    Bipolar 2 disorder (H)    Environmental allergies    Smoker    Class 1 obesity without serious comorbidity with body mass index (BMI) of 33.0 to 33.9 in adult    Macromastia        Medications     Current Outpatient Medications   Medication Sig Dispense Refill    ARIPiprazole (ABILIFY) 5 MG tablet Take 0.5 tablets (2.5 mg) by mouth daily 45 tablet 0    escitalopram (LEXAPRO) 5 MG tablet Take 1 tablet (5 mg) by mouth daily 90 tablet 0    [START ON 2/22/2024] lamoTRIgine (LAMICTAL) 100 MG tablet Take 1 tablet (100 mg) by mouth daily for 14 days, THEN 2 tablets (200 mg) daily for 30 days. 74 tablet 0    lamoTRIgine (LAMICTAL) 25 MG tablet Take 1 tablet (25 mg) by mouth daily for 14 days, THEN 2 tablets (50 mg) daily for 14 days. 42 tablet 0    loratadine (CLARITIN) 10 MG tablet Take 10 mg by mouth      " multivitamin w/minerals (MULTI-VITAMIN) tablet Take 1 tablet by mouth daily      norgestimate-ethinyl estradiol (ORTHO-CYCLEN) 0.25-35 MG-MCG tablet Take 1 tablet by mouth daily 84 tablet 3    phentermine (ADIPEX-P) 15 MG capsule Take 1 capsule (15 mg) by mouth every morning 30 capsule 3    spironolactone (ALDACTONE) 100 MG tablet Take 1 tablet (100 mg) by mouth daily 90 tablet 3    topiramate (TOPAMAX) 25 MG tablet Take  75mg at bedtime 90 tablet 2    Cholecalciferol (VITAMIN D-3) 125 MCG (5000 UT) TABS Take 1 capsule by mouth daily 30 tablet 3    lactobacillus rhamnosus, GG, (CULTURELL) capsule Take 1 capsule by mouth 2 times daily (Patient not taking: Reported on 1/24/2024)          Labs and Data         10/24/2023    10:54 AM 1/24/2024     9:42 AM   PROMIS-10 Total Score w/o Sub Scores   PROMIS TOTAL - SUBSCORES 25    25 24    24         10/24/2023    10:54 AM   CAGE-AID Total Score   Total Score 0   Total Score MyChart 0 (A total score of 2 or greater is considered clinically significant)         1/2/2024     8:01 AM 1/15/2024     9:23 PM 1/24/2024     9:40 AM   PHQ-9 SCORE   PHQ-9 Total Score MyChart 10 (Moderate depression) 16 (Moderately severe depression) 13 (Moderate depression)   PHQ-9 Total Score 10 16 13         9/14/2023     9:10 AM 10/24/2023    10:53 AM 1/24/2024     9:41 AM   BLANCA-7 SCORE   Total Score 8 (mild anxiety) 14 (moderate anxiety) 14 (moderate anxiety)   Total Score 8 14    14 14    14       Liver/Kidney Function, TSH Metabolic Blood counts   Recent Labs   Lab Test 06/20/23  0744 03/06/23  0855   AST 24 30   ALT 30 43*   ALKPHOS 88 99   CR 0.70 0.77     Recent Labs   Lab Test 03/06/23  0855   TSH 1.38    Recent Labs   Lab Test 09/14/23  0946   CHOL 170   TRIG 159*   LDL 95   HDL 43*     Recent Labs   Lab Test 03/06/23  0855   A1C 5.6     Recent Labs   Lab Test 09/14/23  0946   GLC 88    Recent Labs   Lab Test 09/14/23  0946 03/06/23  0855   WBC  --  8.3   HGB 14.0 14.0   HCT  --  42.4   MCV   --  90   PLT  --  263         PROVIDER: Reilly Karimi MD    Level of Medical Decision Making:   - At least 1 chronic problem that is not stable  - Engaged in prescription drug management during visit (discussed any medication benefits, side effects, alternatives, etc.)       Patient staffed in clinic with Dr. Glaser who will sign the note.  Supervisor is Dr. Michelle.

## 2024-01-24 NOTE — NURSING NOTE
Chief Complaint   Patient presents with    Eval/Assessment     Bipolar 2 disorder     - Christiano Jacob, Visit Facilitator

## 2024-01-25 RX ORDER — LAMOTRIGINE 100 MG/1
TABLET ORAL
Qty: 74 TABLET | Refills: 0 | Status: SHIPPED | OUTPATIENT
Start: 2024-02-22 | End: 2024-04-06

## 2024-01-25 RX ORDER — LAMOTRIGINE 25 MG/1
TABLET ORAL
Qty: 42 TABLET | Refills: 0 | Status: SHIPPED | OUTPATIENT
Start: 2024-01-25 | End: 2024-09-03

## 2024-01-25 RX ORDER — ARIPIPRAZOLE 5 MG/1
2.5 TABLET ORAL DAILY
Qty: 45 TABLET | Refills: 0 | Status: SHIPPED | OUTPATIENT
Start: 2024-01-25 | End: 2024-06-03

## 2024-01-25 RX ORDER — ESCITALOPRAM OXALATE 5 MG/1
5 TABLET ORAL DAILY
Qty: 90 TABLET | Refills: 0 | Status: SHIPPED | OUTPATIENT
Start: 2024-01-25 | End: 2024-02-16

## 2024-01-30 ENCOUNTER — VIRTUAL VISIT (OUTPATIENT)
Dept: PSYCHOLOGY | Facility: CLINIC | Age: 29
End: 2024-01-30
Payer: COMMERCIAL

## 2024-01-30 DIAGNOSIS — F31.81 BIPOLAR 2 DISORDER (H): Primary | ICD-10-CM

## 2024-01-30 PROCEDURE — 90832 PSYTX W PT 30 MINUTES: CPT | Mod: 95

## 2024-01-30 NOTE — PROGRESS NOTES
[facility]                                     Progress Note    Patient Name: Cindy Contreras  Date: 1/30/24     Service Type: Individual      Session Start Time: 8:00 am  Session End Time: 8:37am     Session Length: 37 min    Session #: 6    Attendees: Client attended alone    Service Modality:  Video Visit:      Provider verified identity through the following two step process.  Patient provided:  Patient is known previously to provider    Telemedicine Visit: The patient's condition can be safely assessed and treated via synchronous audio and visual telemedicine encounter.      Reason for Telemedicine Visit: Patient convenience (e.g. access to timely appointments / distance to available provider)    Originating Site (Patient Location): Patient's home    Distant Site (Provider Location): [facility] [facility]    Consent:  The patient/guardian has verbally consented to: the potential risks and benefits of telemedicine (video visit) versus in person care; bill my insurance or make self-payment for services provided; and responsibility for payment of non-covered services.     Patient would like the video invitation sent by:  My Chart    Mode of Communication:  Video Conference via [facility]    Distant Location (Provider):  Off-site    As the provider I attest to compliance with applicable laws and regulations related to telemedicine.    DATA  Interactive Complexity: No  Crisis: No      Progress Since Last Session (Related to Symptoms / Goals / Homework):   Symptoms: Improving mood stability    Homework: Partially completed      Episode of Care Goals: Minimal progress - CONTEMPLATION (Considering change and yet undecided); Intervened by assessing the negative and positive thinking (ambivalence) about behavior change     Current / Ongoing Stressors and Concerns:  Improving mood-stability. Work overwhelm. Stopped vaping nicotine. Planning to move to  [place] in June to be nearer to mom and sister. Some dating, not satisfying. Less thoughts of past relationship. Preparing for return to school.     Treatment Objective(s) Addressed in This Session:   Reinforce  safety plan  Patient will identify and practice at least 5 strategies for more effectively managing Bipolar Disorder.  Patient will learn and practice grounding, DBT skills including distress tolerance, emotion regulation and mindfulness  Patient will identify and practice structure in sleep, ADLs, exercise and other wellness activities   Intervention:   Interpersonal Therapy: provided active listening and validation, utilized rapport building  Provided psychoeducation on grounding skills, meditation loy and loving kindness. Reinforced ADLs.    Assessments completed prior to visit:  The following assessments were completed by patient for this visit:  PHQ2:       7/5/2022     3:27 PM   PHQ-2 ( 1999 [employer])   Q1: Little interest or pleasure in doing things 3   Q2: Feeling down, depressed or hopeless 3   PHQ-2 Score 6   Q1: Little interest or pleasure in doing things Nearly every day   Q2: Feeling down, depressed or hopeless Nearly every day   PHQ-2 Score 6     PHQ9:       9/14/2023     9:10 AM 10/24/2023    10:42 AM 10/31/2023     7:47 AM 12/20/2023    12:47 PM 1/2/2024     8:01 AM 1/15/2024     9:23 PM 1/24/2024     9:40 AM   PHQ-9 SCORE   PHQ-9 Total Score MyChart 7 (Mild depression) 16 (Moderately severe depression) 16 (Moderately severe depression) 6 (Mild depression) 10 (Moderate depression) 16 (Moderately severe depression) 13 (Moderate depression)   PHQ-9 Total Score 7 16 16 6 10 16 13     GAD2:       10/24/2023    10:53 AM 1/24/2024     9:41 AM   BLANCA-2   Feeling nervous, anxious, or on edge 2 2   Not being able to stop or control worrying 2 2   BLANCA-2 Total Score 4    4 4    4     GAD7:       7/25/2023     2:51 PM 9/14/2023     9:10 AM 10/24/2023    10:53 AM 1/24/2024     9:41 AM   BLANCA-7 SCORE   Total  Score 8 (mild anxiety) 8 (mild anxiety) 14 (moderate anxiety) 14 (moderate anxiety)   Total Score 8 8 14    14 14    14     CAGE-AID:       10/24/2023    10:54 AM   CAGE-AID Total Score   Total Score 0   Total Score MyChart 0 (A total score of 2 or greater is considered clinically significant)     PROMIS 10-Global Health (all questions and answers displayed):       10/24/2023    10:54 AM 1/24/2024     9:42 AM   PROMIS 10   In general, would you say your health is: Good Very good   In general, would you say your quality of life is: Good Very good   In general, how would you rate your physical health? Good Very good   In general, how would you rate your mental health, including your mood and your ability to think? Fair Fair   In general, how would you rate your satisfaction with your social activities and relationships? Good Fair   In general, please rate how well you carry out your usual social activities and roles Good Very good   To what extent are you able to carry out your everyday physical activities such as walking, climbing stairs, carrying groceries, or moving a chair? Completely Completely   In the past 7 days, how often have you been bothered by emotional problems such as feeling anxious, depressed, or irritable? Often Always   In the past 7 days, how would you rate your fatigue on average? Moderate Moderate   In the past 7 days, how would you rate your pain on average, where 0 means no pain, and 10 means worst imaginable pain? 3 5   In general, would you say your health is: 3    In general, would you say your quality of life is: 3    In general, how would you rate your physical health? 3    In general, how would you rate your mental health, including your mood and your ability to think? 2    In general, how would you rate your satisfaction with your social activities and relationships? 3    In general, please rate how well you carry out your usual social activities and roles. (This includes activities at  home, at work and in your community, and responsibilities as a parent, child, spouse, employee, friend, etc.) 3    To what extent are you able to carry out your everyday physical activities such as walking, climbing stairs, carrying groceries, or moving a chair? 5    In the past 7 days, how often have you been bothered by emotional problems such as feeling anxious, depressed, or irritable? 4    In the past 7 days, how would you rate your fatigue on average? 3    In the past 7 days, how would you rate your pain on average, where 0 means no pain, and 10 means worst imaginable pain? 3    Global Mental Health Score 10    10 9       Global Physical Health Score 15    15 15       PROMIS TOTAL - SUBSCORES 25    25 24           Information is confidential and restricted. Go to Review Flowsheets to unlock data.    Multiple values from one day are sorted in reverse-chronological order     Export Suicide Severity Rating Scale (Lifetime/Recent)      10/24/2023    11:00 AM   Export Suicide Severity Rating (Lifetime/Recent)   Q1 Wished to be Dead (Past Month) no   Q2 Suicidal Thoughts (Past Month) no   Q6 Suicide Behavior (Lifetime) no         ASSESSMENT: Current Emotional / Mental Status (status of significant symptoms):   Risk status (Self / Other harm or suicidal ideation)   Patient denies current fears or concerns for personal safety.   Patient reports the following current or recent suicidal ideation or behaviors: passive SI [id number] by distraction.   Patient denies current or recent homicidal ideation or behaviors.   Patient reports current or recent self injurious behavior or ideation including passive Ideation.   Patient denies other safety concerns.   Patient reports there has been no change in risk factors since their last session.     Patient reports there has been no change in protective factors since their last session.     A safety and risk management plan has been developed including: Patient  consented to co-developed safety plan on 10/31/23.  Safety and risk management plan was reviewed.   Patient agreed to use safety plan should any safety concerns arise.  A copy was made available to the patient.     Appearance:   Appropriate    Eye Contact:   Good    Psychomotor Behavior: Normal    Attitude:   Cooperative    Orientation:   All   Speech    Rate / Production: Normal     Volume:  Normal    Mood:    Depressed    Affect:    Blunted    Thought Content:  Clear  Rumination    Thought Form:  Circumstantial   Insight:    Fair      Medication Review:   No changes to current psychiatric medication(s)     Medication Compliance:   Yes     Changes in Health Issues:   None reported     Chemical Use Review:   Substance Use: increase in alcohol .  Patient reports frequency of use at home, now stopped again following self harm (drank 2 bottles of wine alone over the week).  Provided encouragement towards sobriety        Tobacco Use: Yes, some vaping, quit to prepare for surgery April 4.  Patient reports frequency of use previously daily. Does not need support.     Diagnosis:  1. Bipolar 2 disorder (H)      Collateral Reports Completed:   Not Applicable    PLAN: (Patient Tasks / Therapist Tasks / Other)  Use safety plan, use resources to move forward (accountability, emotional intelligence, intuition, loving kindness.) Complete values exercise, schedule     Marycruz Degroot, [facility]  1/30/2024    _________________________________________________________________                                            Individual Treatment Plan    Patient's Name: Cindy Contreras  YOB: 1995    Date of Creation: 1/2/24  Date Treatment Plan Last Reviewed/Revised:     DSM5 Diagnoses: 296.89 Bipolar II Disorder With seasonal pattern  Psychosocial / Contextual Factors:   PROMIS (reviewed every 90 days): 24 (10/25/23)    Referral / Collaboration:  Referral to another professional/service is not indicated at this  time..    Anticipated number of session for this episode of care: 6-9 sessions  Anticipation frequency of session: Every other week  Anticipated Duration of each session: 53 or more minutes  Treatment plan will be reviewed in 90 days or when goals have been changed.     MeasurableTreatment Goal(s) related to diagnosis / functional impairment(s)  Goal 1: Patient will learn to reduce rumination, practice thought stopping to improve emotion regulation to improve focus and healthy behaviors relationships    I will know I've met my goal when tbc.      Objective #A (Patient Action)    Patient will  Use created safety plan when sx present, share with others .  Status: New - Date: 1/2/24      Intervention(s)  Therapist will teach  safety plan skills .    Objective #B  Patient will  identify values and put them into practice for daily living/decision-making .  Status:  New - Date: 1/2/24  Intervention(s)  Therapist will assign homework of values practice and process/integrate  identified values into therapy goals .    Objective #C    Patient will identify and practice at least 5 strategies for more effectively managing Bipolar Disorder.  Patient will learn and practice grounding, DBT skills including distress tolerance, emotion regulation and mindfulness  Patient will identify and practice structure in sleep, ADLs, exercise and other wellness activities  Status: New - Date: 1/2/24      Intervention(s)  Therapist will teach skills and assign homework .    Patient has reviewed and agreed to the above plan.      MARK Ojeda  January 2, 2024                [facility] Amira Contreras     SAFETY PLAN:  Step 1: Warning signs / cues (Thoughts, images, mood, situation, behavior) that a crisis may be developing:  Thoughts:  I'm a failure, I'm alone, Nobody cares about me  Images: flashbacks and visions of harm: past memories of cutting  Thinking Processes: ruminations  "(can't stop thinking about my problems): relationships  Mood: worsening depression, hopelessness, and mood swings  Behaviors: isolating/withdrawing , can't stop crying, not taking care of myself, not taking care of my responsibilities, sleeping too much, not sleeping enough, and increasing frequency and duration of dissociation  Situations: relationship problems, financial stress, and tough situations (ie car accident)    Step 2: Coping strategies - Things I can do to take my mind off of my problems without contacting another person (relaxation technique, physical activity):  Distress Tolerance Strategies:  play with my pet , listen to positive and upbeat music: play guitar, sensory based activities/self-soothe with five senses: essential oils, watch a funny movie: podcast-H3, read a book:  , change body temperature (ice pack/cold water) , paced breathing/progressive muscle relaxation, intense exercise: gym across the street  for 2-3 minutes , and play guitar  Physical Activities: go for a walk  Focus on helpful thoughts:  \"I will get through this\", \"Ride the wave\", remind myself of what is important to me: dog, family, friends, and my people are here for me  Step 3: People and social settings that provide distraction:   Mom, best friend Agatha, sister Angela   coffee shop, park, library, gym , and work   Step 4: Remind myself of people and things that are important to me and worth living for:  family, friends, dog  Step 5: When I am in crisis, I can ask these people to help me use my safety plan:  Agatha, mom, stepmom,   Step 6: Making the environment safe:   be around others  Step 7: Professionals or agencies I can contact during a crisis:  Suicide Prevention Lifeline: Call or Text 988   Local Crisis Services:  [facility] [place], Warm Line    Call 911 or go to my nearest emergency department.   I helped develop this safety plan and agree to use it when needed.  I have been given a copy of this plan.      Client " signature _________________________________________________________________  Today s date:  10/31/2023  Completed by Provider Name/ Credentials:  Marycruz Degroot MSW LICSW  October 31, 2023  Adapted from Safety Plan Template 2008 Nelsy Cervantes and Igor Glasgow is reprinted with the express permission of the authors.  No portion of the Safety Plan Template may be reproduced without the express, written permission.  You can contact the authors at [email or web address] or [email or web address].

## 2024-02-06 NOTE — PROGRESS NOTES
Medication Therapy Management (MTM) Encounter    ASSESSMENT:                            Medication Adherence/Access: No issues identified    Bipolar Disorder, anxiety:   Concern for ongoing hypomania symptoms. Tolerating lamotrigine well, continue with planned titration. Discussed options for hypomania management including stopping lexapro or possibly increasing dose of abilify. Will discuss with Dr. Karimi.     Allergic Rhinitis:   Stable     Contraception:   Stable     Weight management:   Stable    Acne:   Stable     Supplements:   Patient could continue to take both vitamin D and multivitamin together with food to help GI upset. Alternatively, could consider just taking multivitamin which contains vitamin D since level is wnl and recheck level in the future. Patient likely plans to continue taking both with food.     PLAN:                            Increase lamotrigine to 50mg daily as planned.   I will talk to Dr. Karimi about stopping Lexapro or increasing Abilify.   Ok to take vitamin D and multivitamin daily with food if taking on an empty stomach causes GI upset.     Follow-up: MTM 1-2 weeks    SUBJECTIVE/OBJECTIVE:                          Kerri Contreras is a 28 year old female contacted via secure video for an initial visit. She was referred to me from psychiatry.      Reason for visit:   Collaborative management of lamotrigine titration    Allergies/ADRs: Reviewed in chart  Past Medical History: Reviewed in chart  Tobacco: She reports that she quit smoking about 3 years ago. Her smoking use included cigarettes. She has a 2.5 pack-year smoking history. She has been exposed to tobacco smoke. She has quit using smokeless tobacco.  Alcohol: did not discuss    Medication Adherence/Access: no issues reported    Bipolar II Disorder, anxiety:   Lexapro 5mg daily  Abilify 2.5mg daily  Lamotrgine 25mg daily (planned increase to 50mg tomorrow)    Cindy is seen at [facility]. Initial  intake by Dr. Karimi. Scheduled to see Dr. Robbins 3/6/24.  Most recent visit was 1/24/24 at which time  lamotrigine was started . Please see note by pt's provider for additional details regarding symptoms and history.     Today, patient reports she is currently experiencing symptoms of hypomania.  She endorses decreased need for sleep, trouble focusing, racing thoughts, impulsivity, anxiety, feeling on edge, increased goal-directed activities.  Last night was the first night she slept more than 3 hours in over a week.  She reports this episode started prior to 1/24/2024 appointment with Dr. Karimi, she thought symptoms were resolving around that time, however they have persisted and increased somewhat since that time. She reports this is her longest period of hypomania, in the past they had lasted at most 4 days.  She denies safety concerns or the need for urgent/ER/hospital care.  She does have some increase stressors at work which may be contributing.    In regard to medication, she reports Abilify and Lexapro were started about 2 years ago and at that time she was dealing primarily with depression and anxiety symptoms.  She has never been on higher doses of either.  She is tolerating lamotrigine well, denies side effects or any signs/symptoms of rash.      Allergic Rhinitis:   loratadine 10 mg rarely  Patient reports no current medication side effects.    Patient feels that current therapy is effective.     Contraception:   Ortho-cycline daily. No issues reported    Weight management:   Phentermine 15mg daily  Topamax 75mg nightly  No issues reported  Wt Readings from Last 4 Encounters:   12/19/23 137 lb 11.2 oz (62.5 kg)   10/25/23 137 lb (62.1 kg)   09/14/23 137 lb 4.8 oz (62.3 kg)   08/23/23 144 lb (65.3 kg)         Acne:   Spironolactone 100mg daily  No issues reported    Supplements:   Vitamin D   Multivitamin  Probiotic   Stopped taking vitamin D and multivitamin due to nausea. Taking them in the  morning on an empty stomach. Wonders if she should only take 1 or the other.       Lab Results   Component Value Date    VITDT 44 12/20/2023    VITDT 18 (L) 03/06/2023       ----------------      I spent 30 minutes with this patient today. A copy of the visit note was provided to the patient's provider(s).    A summary of these recommendations was sent via [employer].    Vaishali Gong, PharmD, BCPP  Medication Therapy Management Pharmacist  [facility]      Telemedicine Visit Details  Type of service:  Video Conference via [employer]  Start Time:  8:08 AM  End Time: 8:38 AM     Medication Therapy Recommendations  Bipolar 2 disorder (H)    Current Medication: escitalopram (LEXAPRO) 5 MG tablet   Rationale: Undesirable effect - Adverse medication event - Safety   Recommendation: Discontinue Medication   Status: Accepted per Provider

## 2024-02-07 ENCOUNTER — VIRTUAL VISIT (OUTPATIENT)
Dept: PHARMACY | Facility: CLINIC | Age: 29
End: 2024-02-07
Payer: COMMERCIAL

## 2024-02-07 ENCOUNTER — MYC MEDICAL ADVICE (OUTPATIENT)
Dept: PHARMACY | Facility: CLINIC | Age: 29
End: 2024-02-07
Payer: COMMERCIAL

## 2024-02-07 DIAGNOSIS — Z78.9 TAKES DIETARY SUPPLEMENTS: ICD-10-CM

## 2024-02-07 DIAGNOSIS — Z30.9 CONTRACEPTIVE MANAGEMENT: ICD-10-CM

## 2024-02-07 DIAGNOSIS — F41.1 GAD (GENERALIZED ANXIETY DISORDER): ICD-10-CM

## 2024-02-07 DIAGNOSIS — F31.81 BIPOLAR 2 DISORDER (H): Primary | ICD-10-CM

## 2024-02-07 DIAGNOSIS — L70.9 ACNE: ICD-10-CM

## 2024-02-07 DIAGNOSIS — E66.811 CLASS 1 OBESITY WITHOUT SERIOUS COMORBIDITY WITH BODY MASS INDEX (BMI) OF 33.0 TO 33.9 IN ADULT: ICD-10-CM

## 2024-02-07 DIAGNOSIS — Z91.09 ENVIRONMENTAL ALLERGIES: ICD-10-CM

## 2024-02-07 PROCEDURE — 99207 PR NO CHARGE LOS: CPT | Mod: 95 | Performed by: PHARMACIST

## 2024-02-07 NOTE — Clinical Note
Hello,   Thank you for the referral! I met with patient who is concerned about hypomania symptoms - see note for details. Wondering your thoughts on having her stop lexapro in case it is contributing? Looks like she is scheduled to see Benito for follow-up, but not sure if she could/should be seen sooner? I am happy to follow-up with her too and do have openings next week.  Let me know your thoughts.   Thank you!  Vaishali Gong, PharmD, BCPP Medication Therapy Management Pharmacist [facility]

## 2024-02-07 NOTE — PATIENT INSTRUCTIONS
"Recommendations from today's MTM visit:                                                      Increase lamotrigine to 50mg daily as planned.   I will talk to Dr. Karimi about stopping Lexapro or increasing Abilify.   Ok to take vitamin D and multivitamin daily with food if taking on an empty stomach causes GI upset.     Follow-up: MTM 1-2 weeks    It was great speaking with you today.  I value your experience and would be very thankful for your time in providing feedback in our clinic survey. In the next few days, you may receive an email or text message from [facility] [employer] with a link to a survey related to your  clinical pharmacist.\"     To schedule another MTM appointment, please call the clinic directly or you may call the MTM scheduling line at [phone] or toll-free at [phone].     My Clinical Pharmacist's contact information:                                                      Please feel free to contact me with any questions or concerns you have.      Vaishali Gong, PharmD, BCPP  Medication Therapy Management Pharmacist  [facility]    "

## 2024-02-07 NOTE — PROGRESS NOTES
see [employer] message. Plan to stop lexapro - ok per dr. Karimi.     Medication Therapy Recommendations  Bipolar 2 disorder (H)    Current Medication: escitalopram (LEXAPRO) 5 MG tablet   Rationale: Undesirable effect - Adverse medication event - Safety   Recommendation: Discontinue Medication   Status: Accepted per Provider

## 2024-02-13 ENCOUNTER — VIRTUAL VISIT (OUTPATIENT)
Dept: PSYCHOLOGY | Facility: CLINIC | Age: 29
End: 2024-02-13
Payer: COMMERCIAL

## 2024-02-13 DIAGNOSIS — F31.81 BIPOLAR 2 DISORDER (H): Primary | ICD-10-CM

## 2024-02-13 PROCEDURE — 90837 PSYTX W PT 60 MINUTES: CPT | Mod: 95

## 2024-02-13 ASSESSMENT — ANXIETY QUESTIONNAIRES
8. IF YOU CHECKED OFF ANY PROBLEMS, HOW DIFFICULT HAVE THESE MADE IT FOR YOU TO DO YOUR WORK, TAKE CARE OF THINGS AT HOME, OR GET ALONG WITH OTHER PEOPLE?: VERY DIFFICULT
5. BEING SO RESTLESS THAT IT IS HARD TO SIT STILL: NEARLY EVERY DAY
7. FEELING AFRAID AS IF SOMETHING AWFUL MIGHT HAPPEN: MORE THAN HALF THE DAYS
3. WORRYING TOO MUCH ABOUT DIFFERENT THINGS: NEARLY EVERY DAY
GAD7 TOTAL SCORE: 20
6. BECOMING EASILY ANNOYED OR IRRITABLE: NEARLY EVERY DAY
IF YOU CHECKED OFF ANY PROBLEMS ON THIS QUESTIONNAIRE, HOW DIFFICULT HAVE THESE PROBLEMS MADE IT FOR YOU TO DO YOUR WORK, TAKE CARE OF THINGS AT HOME, OR GET ALONG WITH OTHER PEOPLE: VERY DIFFICULT
2. NOT BEING ABLE TO STOP OR CONTROL WORRYING: NEARLY EVERY DAY
GAD7 TOTAL SCORE: 20
7. FEELING AFRAID AS IF SOMETHING AWFUL MIGHT HAPPEN: MORE THAN HALF THE DAYS
1. FEELING NERVOUS, ANXIOUS, OR ON EDGE: NEARLY EVERY DAY
GAD7 TOTAL SCORE: 20
4. TROUBLE RELAXING: NEARLY EVERY DAY

## 2024-02-13 NOTE — PROGRESS NOTES
[facility]                                     Progress Note    Patient Name: Cindy Contreras  Date: 2/13/24     Service Type: Individual      Session Start Time: 8:00 am  Session End Time: 8:53am     Session Length: 53 min    Session #: 7    Attendees: Client attended alone    Service Modality:  Video Visit:      Provider verified identity through the following two step process.  Patient provided:  Patient is known previously to provider    Telemedicine Visit: The patient's condition can be safely assessed and treated via synchronous audio and visual telemedicine encounter.      Reason for Telemedicine Visit: Patient convenience (e.g. access to timely appointments / distance to available provider)    Originating Site (Patient Location): Patient's home    Distant Site (Provider Location): [facility]    Consent:  The patient/guardian has verbally consented to: the potential risks and benefits of telemedicine (video visit) versus in person care; bill my insurance or make self-payment for services provided; and responsibility for payment of non-covered services.     Patient would like the video invitation sent by:  My Chart    Mode of Communication:  Video Conference via [facility]    Distant Location (Provider):  Off-site    As the provider I attest to compliance with applicable laws and regulations related to telemedicine.    DATA  Interactive Complexity: No  Crisis: No      Progress Since Last Session (Related to Symptoms / Goals / Homework):   Symptoms: Improving drained following mood instability    Homework: Partially completed      Episode of Care Goals: Minimal progress - CONTEMPLATION (Considering change and yet undecided); Intervened by assessing the negative and positive thinking (ambivalence) about behavior change     Current / Ongoing Stressors and Concerns:  Inconsistent mood-stability-recent hypomania. Work overwhelm. Stopped  vaping nicotine. Planning to move to [place] in June to be nearer to mom and sister. Rumination about past relationship(s)     Treatment Objective(s) Addressed in This Session:   Reinforce  safety plan  Patient will identify and practice at least 5 strategies for more effectively managing Bipolar Disorder.  Patient will learn and practice grounding, DBT skills including distress tolerance, emotion regulation and mindfulness  Patient will identify and practice structure in sleep, ADLs, exercise and other wellness activities   Intervention:   Interpersonal Therapy: provided active listening and validation, utilized rapport building  Provided psychoeducation on grounding skills, meditation loy and loving kindness. Reinforced ADLs, medication in the am. Surfaced family patterns and attachment struggles impact on current functioning. Completed interactive awareness reflection.    Assessments completed prior to visit:  The following assessments were completed by patient for this visit:  PHQ2:       7/5/2022     3:27 PM   PHQ-2 ( 1999 [employer])   Q1: Little interest or pleasure in doing things 3   Q2: Feeling down, depressed or hopeless 3   PHQ-2 Score 6   Q1: Little interest or pleasure in doing things Nearly every day   Q2: Feeling down, depressed or hopeless Nearly every day   PHQ-2 Score 6     PHQ9:       9/14/2023     9:10 AM 10/24/2023    10:42 AM 10/31/2023     7:47 AM 12/20/2023    12:47 PM 1/2/2024     8:01 AM 1/15/2024     9:23 PM 1/24/2024     9:40 AM   PHQ-9 SCORE   PHQ-9 Total Score MyChart 7 (Mild depression) 16 (Moderately severe depression) 16 (Moderately severe depression) 6 (Mild depression) 10 (Moderate depression) 16 (Moderately severe depression) 13 (Moderate depression)   PHQ-9 Total Score 7 16 16 6 10 16 13     GAD2:       10/24/2023    10:53 AM 1/24/2024     9:41 AM 2/13/2024     8:00 AM   BLANCA-2   Feeling nervous, anxious, or on edge 2 2 3   Not being able to stop or control worrying 2 2 3   BLANCA-2  Total Score 4    4 4    4 6     GAD7:       7/25/2023     2:51 PM 9/14/2023     9:10 AM 10/24/2023    10:53 AM 1/24/2024     9:41 AM 2/13/2024     8:00 AM   BLANCA-7 SCORE   Total Score 8 (mild anxiety) 8 (mild anxiety) 14 (moderate anxiety) 14 (moderate anxiety) 20 (severe anxiety)   Total Score 8 8 14    14 14    14 20     CAGE-AID:       10/24/2023    10:54 AM   CAGE-AID Total Score   Total Score 0   Total Score MyChart 0 (A total score of 2 or greater is considered clinically significant)     PROMIS 10-Global Health (all questions and answers displayed):       10/24/2023    10:54 AM 1/24/2024     9:42 AM 2/13/2024     8:01 AM   PROMIS 10   In general, would you say your health is: Good Very good Very good   In general, would you say your quality of life is: Good Very good Good   In general, how would you rate your physical health? Good Very good Very good   In general, how would you rate your mental health, including your mood and your ability to think? Fair Fair Fair   In general, how would you rate your satisfaction with your social activities and relationships? Good Fair Very good   In general, please rate how well you carry out your usual social activities and roles Good Very good Good   To what extent are you able to carry out your everyday physical activities such as walking, climbing stairs, carrying groceries, or moving a chair? Completely Completely Completely   In the past 7 days, how often have you been bothered by emotional problems such as feeling anxious, depressed, or irritable? Often Always Always   In the past 7 days, how would you rate your fatigue on average? Moderate Moderate Moderate   In the past 7 days, how would you rate your pain on average, where 0 means no pain, and 10 means worst imaginable pain? 3 5 5   In general, would you say your health is: 3  4   In general, would you say your quality of life is: 3  3   In general, how would you rate your physical health? 3  4   In general, how  would you rate your mental health, including your mood and your ability to think? 2  2   In general, how would you rate your satisfaction with your social activities and relationships? 3  4   In general, please rate how well you carry out your usual social activities and roles. (This includes activities at home, at work and in your community, and responsibilities as a parent, child, spouse, employee, friend, etc.) 3  3   To what extent are you able to carry out your everyday physical activities such as walking, climbing stairs, carrying groceries, or moving a chair? 5  5   In the past 7 days, how often have you been bothered by emotional problems such as feeling anxious, depressed, or irritable? 4  5   In the past 7 days, how would you rate your fatigue on average? 3  3   In the past 7 days, how would you rate your pain on average, where 0 means no pain, and 10 means worst imaginable pain? 3  5   Global Mental Health Score 10    10 9     10   Global Physical Health Score 15    15 15     15   PROMIS TOTAL - SUBSCORES 25    25 24     25       Information is confidential and restricted. Go to Review Flowsheets to unlock data.    Multiple values from one day are sorted in reverse-chronological order     [place] Suicide Severity Rating Scale (Lifetime/Recent)      10/24/2023    11:00 AM   [place] Suicide Severity Rating (Lifetime/Recent)   Q1 Wished to be Dead (Past Month) no   Q2 Suicidal Thoughts (Past Month) no   Q6 Suicide Behavior (Lifetime) no         ASSESSMENT: Current Emotional / Mental Status (status of significant symptoms):   Risk status (Self / Other harm or suicidal ideation)   Patient denies current fears or concerns for personal safety.   Patient reports the following current or recent suicidal ideation or behaviors: passive SI teownkwe-ptyqginm-[id number] by distraction.   Patient denies current or recent homicidal ideation or behaviors.   Patient reports current or recent self injurious behavior or  ideation including passive Ideation.   Patient denies other safety concerns.   Patient reports there has been no change in risk factors since their last session.     Patient reports there has been no change in protective factors since their last session.     A safety and risk management plan has been developed including: Patient consented to co-developed safety plan on 10/31/23.  Safety and risk management plan was reviewed.   Patient agreed to use safety plan should any safety concerns arise.  A copy was made available to the patient.     Appearance:   Appropriate    Eye Contact:   Good    Psychomotor Behavior: Normal    Attitude:   Cooperative    Orientation:   All   Speech    Rate / Production: Normal     Volume:  Normal    Mood:    Depressed    Affect:    Blunted    Thought Content:  Clear  Rumination    Thought Form:  Circumstantial   Insight:    Fair      Medication Review:   No changes to current psychiatric medication(s)     Medication Compliance:   Yes     Changes in Health Issues:   None reported     Chemical Use Review:   Substance Use: increase in alcohol .  Patient reports frequency of use at home, now stopped again ).  Provided encouragement towards sobriety        Tobacco Use: Yes, some vaping, quit to prepare for surgery April 4.  Patient reports frequency of use previously daily. Does not need support.     Diagnosis:  1. Bipolar 2 disorder (H)      Collateral Reports Completed:   Not Applicable    PLAN: (Patient Tasks / Therapist Tasks / Other)  Use safety plan, use resources to move forward (accountability, emotional intelligence, intuition, loving kindness.) Complete values exercise, return to am meds, Yoly Degroot, [facility]  2/13/2024    _________________________________________________________________                                            Individual Treatment Plan    Patient's Name: Cindy Contreras  YOB: 1995    Date of Creation: 1/2/24  Date Treatment Plan Last  Reviewed/Revised:     DSM5 Diagnoses: 296.89 Bipolar II Disorder With seasonal pattern  Psychosocial / Contextual Factors:   PROMIS (reviewed every 90 days): 24 (10/25/23)    Referral / Collaboration:  Referral to another professional/service is not indicated at this time..    Anticipated number of session for this episode of care: 6-9 sessions  Anticipation frequency of session: Every other week  Anticipated Duration of each session: 53 or more minutes  Treatment plan will be reviewed in 90 days or when goals have been changed.     MeasurableTreatment Goal(s) related to diagnosis / functional impairment(s)  Goal 1: Patient will learn to reduce rumination, practice thought stopping to improve emotion regulation to improve focus and healthy behaviors relationships    I will know I've met my goal when tbc.      Objective #A (Patient Action)    Patient will  Use created safety plan when sx present, share with others .  Status: New - Date: 1/2/24      Intervention(s)  Therapist will teach  safety plan skills .    Objective #B  Patient will  identify values and put them into practice for daily living/decision-making .  Status:  New - Date: 1/2/24  Intervention(s)  Therapist will assign homework of values practice and process/integrate  identified values into therapy goals .    Objective #C    Patient will identify and practice at least 5 strategies for more effectively managing Bipolar Disorder.  Patient will learn and practice grounding, DBT skills including distress tolerance, emotion regulation and mindfulness  Patient will identify and practice structure in sleep, ADLs, exercise and other wellness activities  Status: New - Date: 1/2/24      Intervention(s)  Therapist will teach skills and assign homework .    Patient has reviewed and agreed to the above plan.      Marycruz Degroot, LICSW  January 2, 2024                [facility]     SAFETY  "PLAN:  Step 1: Warning signs / cues (Thoughts, images, mood, situation, behavior) that a crisis may be developing:  Thoughts:  I'm a failure, I'm alone, Nobody cares about me  Images: flashbacks and visions of harm: past memories of cutting  Thinking Processes: ruminations (can't stop thinking about my problems): relationships  Mood: worsening depression, hopelessness, and mood swings  Behaviors: isolating/withdrawing , can't stop crying, not taking care of myself, not taking care of my responsibilities, sleeping too much, not sleeping enough, and increasing frequency and duration of dissociation  Situations: relationship problems, financial stress, and tough situations (ie car accident)    Step 2: Coping strategies - Things I can do to take my mind off of my problems without contacting another person (relaxation technique, physical activity):  Distress Tolerance Strategies:  play with my pet , listen to positive and upbeat music: play guitar, sensory based activities/self-soothe with five senses: essential oils, watch a funny movie: podcast-H3, read a book:  , change body temperature (ice pack/cold water) , paced breathing/progressive muscle relaxation, intense exercise: gym across the street  for 2-3 minutes , and play guitar  Physical Activities: go for a walk  Focus on helpful thoughts:  \"I will get through this\", \"Ride the wave\", remind myself of what is important to me: dog, family, friends, and my people are here for me  Step 3: People and social settings that provide distraction:   Mom, best friend Agatha, sister Angela   coffee shop, park, library, gym , and work   Step 4: Remind myself of people and things that are important to me and worth living for:  family, friends, dog  Step 5: When I am in crisis, I can ask these people to help me use my safety plan:  Agatha, mom, stepmom,   Step 6: Making the environment safe:   be around others  Step 7: Professionals or agencies I can contact during a crisis:  Suicide " Prevention Lifeline: Call or Text 988   Local Crisis Services:  [facility], Warm Line    Call 911 or go to my nearest emergency department.   I helped develop this safety plan and agree to use it when needed.  I have been given a copy of this plan.      Client signature _________________________________________________________________  Today s date:  10/31/2023  Completed by Provider Name/ Credentials:  Marycruz Degroot MSW LICSW  October 31, 2023  Adapted from Safety Plan Template 2008 Nelsy Cervantes and Igor Glasgow is reprinted with the express permission of the authors.  No portion of the Safety Plan Template may be reproduced without the express, written permission.  You can contact the authors at [email or web address] or [email or web address].

## 2024-02-14 ENCOUNTER — TELEPHONE (OUTPATIENT)
Dept: PLASTIC SURGERY | Facility: CLINIC | Age: 29
End: 2024-02-14
Payer: COMMERCIAL

## 2024-02-14 NOTE — TELEPHONE ENCOUNTER
Received voicemail from patient     Patient is requesting an update about the status     Unclear if PA has been submitted per chart - patient has been in contact with PA team    Will ask PA team to follow up with patient    __    Jennie Johnson on 2/14/2024 at 3:25 PM  P: [phone]

## 2024-02-15 NOTE — PROGRESS NOTES
Medication Therapy Management (MTM) Encounter    ASSESSMENT:                            Medication Adherence/Access: No issues identified      Bipolar II Disorder, anxiety:   Hypomania symptoms resolved, lexapro stopped last week. Tolerating lamotrigine well. Will continue with planned lamotrigine titration - increasing to 100mg next week.     PLAN:                            Continue current medications  See Dr. Robbins 3/6    Follow-up: MTM - happy to follow-up as needed, will check in 1-2 months    SUBJECTIVE/OBJECTIVE:                          Kerri Contreras is a 28 year old female contacted via secure video for a follow-up visit from 2/7/24.       Reason for visit: medication check in.    Allergies/ADRs: Reviewed in chart  Past Medical History: Reviewed in chart  Tobacco: She reports that she quit smoking about 3 years ago. Her smoking use included cigarettes. She has a 2.5 pack-year smoking history. She has been exposed to tobacco smoke. She has quit using smokeless tobacco.  Alcohol: did not discuss    Medication Adherence/Access: no issues reported    Bipolar II Disorder, anxiety:   Abilify 2.5mg daily  Lamotrigine 50mg daily (planned increase to 100mg 2/22)    Cindy is seen at [facility]. Initial intake by Dr. Karimi. Scheduled to see Dr. Robbins 3/6/24.  Most recent visit was 1/24/24 at which time lamotrigine was started. Please see note by pt's provider for additional details regarding symptoms and history.     Today, patient reports hypomania resolved pretty quickly after stopping Lexapro. No concerning hypomania symptoms now. Describes her mood as low, which she reports is her baseline. Sleeping a lot.  Will increase lamotrigine to 100mg next week. Tolerating it well, no signs/symptoms rash.     From 2/7/24 MTM visit:   Today, patient reports she is currently experiencing symptoms of hypomania.  She endorses decreased need for sleep, trouble focusing, racing thoughts,  impulsivity, anxiety, feeling on edge, increased goal-directed activities.  Last night was the first night she slept more than 3 hours in over a week.  She reports this episode started prior to 1/24/2024 appointment with Dr. Karimi, she thought symptoms were resolving around that time, however they have persisted and increased somewhat since that time. She reports this is her longest period of hypomania, in the past they had lasted at most 4 days.  She denies safety concerns or the need for urgent/ER/hospital care.  She does have some increase stressors at work which may be contributing.    In regard to medication, she reports Abilify and Lexapro were started about 2 years ago and at that time she was dealing primarily with depression and anxiety symptoms.  She has never been on higher doses of either.  She is tolerating lamotrigine well, denies side effects or any signs/symptoms of rash.      ----------------      I spent 10 minutes with this patient today. A copy of the visit note was provided to the patient's provider(s).    A summary of these recommendations was sent via [employer]TAP.    Vaishali Gong, PharmD, BCPP  Medication Therapy Management Pharmacist  [facility]      Telemedicine Visit Details  Type of service:  Video Conference via FindIt  Start Time:  8:30 AM  End Time: 8:40 AM     Medication Therapy Recommendations  No medication therapy recommendations to display

## 2024-02-16 ENCOUNTER — VIRTUAL VISIT (OUTPATIENT)
Dept: PHARMACY | Facility: CLINIC | Age: 29
End: 2024-02-16
Payer: COMMERCIAL

## 2024-02-16 DIAGNOSIS — F41.1 GAD (GENERALIZED ANXIETY DISORDER): ICD-10-CM

## 2024-02-16 DIAGNOSIS — F31.81 BIPOLAR 2 DISORDER (H): Primary | ICD-10-CM

## 2024-02-16 PROCEDURE — 99207 PR NO CHARGE LOS: CPT | Mod: 95 | Performed by: PHARMACIST

## 2024-02-16 NOTE — Clinical Note
Hello,   Just wanted to loop you both in on my follow-up with patient. There was concern for hypomania symptoms at my first visit with her last week, we stopped lexapro and hypomania now resolved. She is continuing lamotrigine titration. Scheduled to see Benito 3/6.   Thank you,   Vaishali Gong, PharmD, BCPP Medication Therapy Management Pharmacist [facility]

## 2024-03-06 ENCOUNTER — VIRTUAL VISIT (OUTPATIENT)
Dept: PSYCHIATRY | Facility: CLINIC | Age: 29
End: 2024-03-06
Attending: STUDENT IN AN ORGANIZED HEALTH CARE EDUCATION/TRAINING PROGRAM
Payer: COMMERCIAL

## 2024-03-06 DIAGNOSIS — F31.81 BIPOLAR 2 DISORDER (H): Primary | ICD-10-CM

## 2024-03-06 PROCEDURE — 99204 OFFICE O/P NEW MOD 45 MIN: CPT | Mod: 95

## 2024-03-06 ASSESSMENT — PATIENT HEALTH QUESTIONNAIRE - PHQ9: SUM OF ALL RESPONSES TO PHQ QUESTIONS 1-9: 19

## 2024-03-06 NOTE — PROGRESS NOTES
Virtual Visit Details    Type of service:  Video Visit     Originating Location (pt. Location): Home    Distant Location (provider location):  On-site  Platform used for Video Visit: Latosha

## 2024-03-06 NOTE — NURSING NOTE
Is the patient currently in the state of [place]? YES    Visit mode:VIDEO    If the visit is dropped, the patient can be reconnected by: VIDEO VISIT: Text to cell phone:   Telephone Information:   Mobile [phone]       Will anyone else be joining the visit? NO  (If patient encounters technical issues they should call [phone]    How would you like to obtain your AVS? MyChart    Are changes needed to the allergy or medication list? No    Reason for visit: No chief complaint on file.    Yolanda JONESF

## 2024-03-06 NOTE — PATIENT INSTRUCTIONS
**For crisis resources, please see the information at the end of this document**   Patient Education    Thank you for coming to the [facility] [place] [facility].    Lab Testing:  If you had lab testing today and your results are reassuring or normal they will be mailed to you or sent through [employer] within 7 days. If the lab tests need quick action we will call you with the results. The phone number we will call with results is # [phone] (home) . If this is not the best number please call our clinic and change the number.    Medication Refills:  If you need any refills please call your pharmacy and they will contact us. Our fax number for refills is [phone]. Please allow three business for refill processing. If you need to  your refill at a new pharmacy, please contact the new pharmacy directly. The new pharmacy will help you get your medications transferred.     Scheduling:  If you have any concerns about today's visit or wish to schedule another appointment please call our office during normal business hours [phone] (8-5:00 M-F)    Contact Us:  Please call [phone] during business hours (8-5:00 M-F).  If after clinic hours, or on the weekend, please call  [phone].    Financial Assistance [phone]  [facility] Billing [phone]  Central Billing Office, [facility]: [phone]  [facility] Billing [phone]  Medical Records [phone]  [facility] Patient Bill of Rights [email or web address]       MENTAL HEALTH CRISIS RESOURCES:  For a emergency help, please call 911 or go to the nearest Emergency Department.     Emergency Walk-In Options:   EmPATH Unit @ [facility] ([place]): [phone] - Specialized mental health emergency area designed to be calming  [facility] ([place]): [phone]  [facility] ([place]):  [phone]  [facility] ([place]): [phone]    [facility] Crisis Information:   Jenifer: [phone]  Roger: [phone]  Deon ARCINIEGA) - Adult: [phone]     Child: [phone]  Leonid - Adult: [phone]     Child: [phone]  [place]: [phone]  List of all [facility] resources:   https://[place].gov/dhs/people-we-serve/adults/health-care/mental-health/resources/crisis-contacts.jsp    National Crisis Information:   Crisis Text Line: Text  [place]  to [address]13  National Suicide Prevention Lifeline: 6-138-683-MXXW (1-597.450.3447)       For online chat options, visit https://suicidepreventionlifeline.org/chat/  Poison Control Center: 1-858.773.1598  Trans Lifeline: 1-446.549.3381 - Hotline for transgender people of all ages  The [place] Project: 4-184-786-1729 - Hotline for LGBT youth     For Non-Emergency Support:   Fast Tracker: Mental Health & Substance Use Disorder Resources -   https://www.fasttrackNetcontinuumn.org/       Again thank you for choosing [facility] [place] [facility] and please let us know how we can best partner with you to improve you and your family's health.    You may be receiving a survey regarding this appointment. We would love to have your feedback, both positive and negative. The survey is done by an external company, so your answers are anonymous.

## 2024-03-06 NOTE — PROGRESS NOTES
"   [facility]  TRANSFER of CARE DIAGNOSTIC ASSESSMENT     CARE TEAM:    PCP- Siobhan Covington  Therapist- Marycruz Smithtucker Manning is a 28 year old who uses the pronouns she, her, hers.      Chief Concern     \"I'm doing well these days, but I want to make sure we will prevent the next episode of elevated mood\".     Diagnoses     Bipolar affective disorder, type II  Borderline personality disorder     Assessment     Cindy Contreras is a 28 year old with a past psychiatric history of bipolar 2 disorder and generalized anxiety disorder who was seen for follow up in the outpatient psychiatry clinic. At present, Kerri reports episodic symptoms that appear cyclic in nature and consistent with episodes of both hypomania and depression, with depressive symptoms, in general, being more prominent. Periods of hypomania are characterized by increased goal-directed activity, increased energy, and some increases in impulsivity, but not to such a degree as to require hospitalization; no history of psychosis during these periods. Depressive periods include depressed mood, low energy, low motivation, and social withdrawal/isolation. This pattern of symptoms does appear to be consistent with a diagnosis of bipolar affective disorder, type II.    The diagnostic picture is, however, somewhat complicated by the presence of emotional dysregulation highly tied to interpersonal conflict, history of SIB, intermittent SI without plan or intent, and a chronic feeling of \"emptiness\". This constellation of symptoms would be consistent with a diagnosis of borderline personality disorder, which in some cases may mirror symptoms of BPADII. That said, from the history provided it appears that while these cluster B traits have a significant impact on Kerri's overall mental health, they are best characterized as a distinct diagnostic consideration from BPAD, rather than the " underlying etiology of those symptoms.  This is supported by reported history of a trial of sertraline resulting in one of these episodes being triggered more acutely.    Today, she was feeling stable while she was concerned about the last episode of elevated mood that lasted 3 to 4 weeks with more severe symptoms compared to previous episodes that were usually only 2 to 3 days.  During this last episode she was in touch with the team and the team decided to stop her Lexapro.  She feels that this helpful and also titration for Lamictal was helpful to manage her elevated mood episode.  We are still in the process of deciding that episode make her eligible for diagnosis of bipolar type I.    In the past, she experienced patterns of anxiety that were generalized across situations and out of proportion to the situations in which they occur and thus fit a diagnosis of generalized anxiety disorder, these symptoms have not been of marked concern at present. Thus this diagnosis was deferred at this time.    Given prominent concerns around emotional dysregulation, Lexapro was stopped and we continued titration of lamotrigine to target mood regulation and mood stabilization. We also discussed with her the potentials for Lithium as an option to be considered in the future and she is in agreement with the plan. See below for plan.    Future Considerations:  If lamotrigine effective for mood regulation, may consider discontinuing Abilify  DBT    Psychotropic Drug Interactions:  [PSYCHCLINICDDI]  none  Management: N/A    MNPMP was not checked today: not using controlled substances    Risk Statements:   Treatment Risk- Risks, benefits, alternatives and potential adverse effects have been discussed and are understood.   Safety Risk-Ally did not appear to be an imminent safety risk to self or others.     Plan     1) Medications:   - Continue Abilify 2.5mg daily  - Stopped escitalopram 5mg daily  - Increase lamotrigine to 200  mg    2) Psychotherapy: Continue with individual psychotherapist    3) Next due:  Labs- SGA monitoring labs due   EKG- N/A   Rating scales- AIMS: Determine next due    4) Referrals: MTM- for monitoring of lamotrigine between available follow-up  none    5) Other: none    6) Follow-up: Return to clinic in 4 weeks on BCT.     Pertinent Background                                                   [most recent eval 01/24/24]     Kerri first experienced depression and anxiety as a teenager. During her early 20's began to experience periods of increased energy with heightened goal-oriented behavior and increased impulsivity associated with episodes of hypomania. These have been relatively well-controlled with Lexapro and Abilify after an initial trial of sertraline triggered an episode of hypomania. Does have a history of chronic SI and prior SIB with a recent recurrence of cutting in 2024. Symptoms of dysregulation, SI, SIB, rejection sensitivity, and inner feeling of emptiness consistent with diagnosis of borderline personality disorder.    Pertinent items include:  SIB, passive SI     Subjective     She has a very busy schedule completing her [employer] degree, living alone, planning to move to [place], having her parents out of the states and starting a new relationship which keeps her very busy and sometimes stressed out.  She feels being reasonably stable in terms of her mood without any safety concerns  She reports having mildly paranoia during last episode of elevated mood  Diagnosed with BPAD II about 2-2.5 years ago. But has struggled with symptoms since she was 17, primarily depression and anxiety up until that point. Hypomania started around early to mid 20's.  Initially started with online psychiatrist, but with changes in insurance had to change platforms, PCP took over med management.  Has been seeing a therapist for a long time, current therapist is somewhat newer.    Current Social  History:  Financial/occupational: Manager at [employer]  Living situation (partner, children, pets, etc): Alone in an apartment  Social/spiritual support: Friends, family  Feels safe at home: Yes    Pertinent Substance Use:   Alcohol: Yes: 2x per month out socially, otherwise 1-2x per week glass of wine   Cannabis: Yes: occasional, but makes her anxious so avoids - 2x per year  Tobacco: Yes: nicotine gum, trying to quit vaping  Caffeine:  Yes: 2x cups coffee per day   Opioids: No   Other substances: none    Medical Review of Systems:   Lightheadedness/orthostasis: Intermittent, thinks it may be related to spironolactone  Headaches: Tension headaches, some association with chronic back pain  GI: none  Sexual health concerns: None    A comprehensive review of systems was performed and is negative other than noted above.    Contraception: Yes: OCPs     Mental Status Exam     Alertness: alert   Appearance: well groomed  Behavior/Demeanor: cooperative, pleasant, and calm, with good  eye contact   Speech: regular rate and rhythm  Language: intact and no problems  Psychomotor: normal or unremarkable  Mood: description consistent with euthymia  Affect: full range; congruent to: mood- yes, content- yes  Thought Process/Associations: unremarkable  Thought Content:  Reports none;  Denies suicidal & violent ideation and delusions  Perception:  Reports none;  Denies hallucinations  Insight: good  Judgment: good  Cognition: does  appear grossly intact; formal cognitive testing was not done  Gait and Station: unremarkable     Social History                                 pt reported     Per Previous Intake Assessment  The patient describes their cultural background as .  Cultural influences and impact on patient's life structure, values, norms, and healthcare: Atheist.  Contextual influences on patient's health include: Contextual Factors: Learning Environment Factors mom supported college education financially,  getting an [employer] now . These factors will be addressed in the Preliminary Treatment plan. Patient identified their preferred language to be English. Patient reported they does not need the assistance of an  or other support involved in therapy.   Patient reported they grew up in [facility]  .  They were raised by biological parents  .  Parents  /  in 2008-9 when patient was about 14.  Patient reported that their childhood was good - mom travelled a lot for work. Dad raised us-2 girls, coached our basketball, softball. Now mom and patient have a great relationship.  Patient described their current relationships with family of origin as good relationships with parents and sister.  Sister is  a year older and has a difficult relationship with dad. Goldy is supportive-works in mental health      Family Mental Health History                                 pt reported     Patient does report a family history of mental health concerns.  Patient reports family history includes Anxiety Disorder in her father and sister; Depression in her mother; Diabetes in her paternal grandmother; Other Cancer in her maternal grandfather..      Past Psychiatric History     Self injurious behavior [method, most recent]: Yes: History of cutting, most recent 1x in 2024 after none for 10 years  Suicide attempt [#, most recent, method]: Yes: As a teenager took a handful of medications, didn't require hospitalization  Suicidal ideation hx [passive, active]: Yes: Passive ideation, no history of plan or intent    Violence/Aggression Hx:No   Psychosis Hx: Non-specific experience of seeing shadowy shapes out of the corners of her eyes  Eating Disorder Hx: No  Trauma hx: Yes: isolated traumatic events, but no chronic sources of trauma    Psych Hosp [#, most recent]: No   Commitment: No   TMS/ECT: No   Outpatient Programs [Day treatment, DBT, eating disorder tx, etc]: Individual therapy only    SUBSTANCE USE HISTORY    Past Use: Yes: alcohol and cannabis; had a period of drinking to intoxication 4-5 days per week about 2 years ago, but has markedly decreased use. Never used alcohol during day hours and did not suffer any consequences or deleterious effects of use.     Past Psych Med Trials      Medication Max Dose (mg) Dates / Duration Helpful? DC Reason / Adverse Effects?   Zoloft    Triggered hypomania   Abilify 2.5mg  Y    Lexapro 5mg  Y    Lamotrigine  01/2024 - current        Vitals   LMP  (LMP Unknown)   Pulse Readings from Last 3 Encounters:   01/24/24 87   12/19/23 89   10/25/23 81     Wt Readings from Last 3 Encounters:   12/19/23 62.5 kg (137 lb 11.2 oz)   10/25/23 62.1 kg (137 lb)   09/14/23 62.3 kg (137 lb 4.8 oz)     BP Readings from Last 3 Encounters:   01/24/24 115/73   12/19/23 111/77   10/25/23 120/81        Medical History     ALLERGIES: Nuts    Patient Active Problem List   Diagnosis    Acne    Bipolar 2 disorder (H)    Environmental allergies    Smoker    Class 1 obesity without serious comorbidity with body mass index (BMI) of 33.0 to 33.9 in adult    Macromastia        Medications     Current Outpatient Medications   Medication Sig Dispense Refill    ARIPiprazole (ABILIFY) 5 MG tablet Take 0.5 tablets (2.5 mg) by mouth daily 45 tablet 0    Cholecalciferol (VITAMIN D-3) 125 MCG (5000 UT) TABS Take 1 capsule by mouth daily 30 tablet 3    lactobacillus rhamnosus, GG, (CULTURELL) capsule Take 1 capsule by mouth 2 times daily      lamoTRIgine (LAMICTAL) 100 MG tablet Take 1 tablet (100 mg) by mouth daily for 14 days, THEN 2 tablets (200 mg) daily for 30 days. 74 tablet 0    lamoTRIgine (LAMICTAL) 25 MG tablet Take 1 tablet (25 mg) by mouth daily for 14 days, THEN 2 tablets (50 mg) daily for 14 days. 42 tablet 0    loratadine (CLARITIN) 10 MG tablet Take 10 mg by mouth daily as needed for allergies      multivitamin w/minerals (MULTI-VITAMIN) tablet Take 1 tablet by mouth daily      norgestimate-ethinyl estradiol  (ORTHO-CYCLEN) 0.25-35 MG-MCG tablet Take 1 tablet by mouth daily 84 tablet 3    phentermine (ADIPEX-P) 15 MG capsule Take 1 capsule (15 mg) by mouth every morning 30 capsule 3    spironolactone (ALDACTONE) 100 MG tablet Take 1 tablet (100 mg) by mouth daily 90 tablet 3    topiramate (TOPAMAX) 25 MG tablet Take  75mg at bedtime 90 tablet 2        Labs and Data         10/24/2023    10:54 AM 1/24/2024     9:42 AM 2/13/2024     8:01 AM   PROMIS-10 Total Score w/o Sub Scores   PROMIS TOTAL - SUBSCORES 25    25 24    24 25         10/24/2023    10:54 AM   CAGE-AID Total Score   Total Score 0   Total Score MyChart 0 (A total score of 2 or greater is considered clinically significant)         1/2/2024     8:01 AM 1/15/2024     9:23 PM 1/24/2024     9:40 AM   PHQ-9 SCORE   PHQ-9 Total Score MyChart 10 (Moderate depression) 16 (Moderately severe depression) 13 (Moderate depression)   PHQ-9 Total Score 10 16 13         10/24/2023    10:53 AM 1/24/2024     9:41 AM 2/13/2024     8:00 AM   BLANCA-7 SCORE   Total Score 14 (moderate anxiety) 14 (moderate anxiety) 20 (severe anxiety)   Total Score 14    14 14    14 20       Liver/Kidney Function, TSH Metabolic Blood counts   Recent Labs   Lab Test 06/20/23  0744 03/06/23  0855   AST 24 30   ALT 30 43*   ALKPHOS 88 99   CR 0.70 0.77     Recent Labs   Lab Test 03/06/23  0855   TSH 1.38    Recent Labs   Lab Test 09/14/23  0946   CHOL 170   TRIG 159*   LDL 95   HDL 43*     Recent Labs   Lab Test 03/06/23  0855   A1C 5.6     Recent Labs   Lab Test 09/14/23  0946   GLC 88    Recent Labs   Lab Test 09/14/23  0946 03/06/23  0855   WBC  --  8.3   HGB 14.0 14.0   HCT  --  42.4   MCV  --  90   PLT  --  263         PROVIDER: Benito Robbins MD    Level of Medical Decision Making:   - At least 1 chronic problem that is not stable  - Engaged in prescription drug management during visit (discussed any medication benefits, side effects, alternatives, etc.)    The patient was discussed with   Mick, but during the staffing the connection was lost and we have not been able to complete a staffing with her.  Supervisor is Dr. Fong.       Patient staffed in clinic with Dr. Glaser who will sign the note.  Supervisor is Dr. Michelle.

## 2024-03-12 ENCOUNTER — OFFICE VISIT (OUTPATIENT)
Dept: FAMILY MEDICINE | Facility: CLINIC | Age: 29
End: 2024-03-12
Payer: COMMERCIAL

## 2024-03-12 VITALS
TEMPERATURE: 98 F | HEIGHT: 61 IN | HEART RATE: 82 BPM | SYSTOLIC BLOOD PRESSURE: 110 MMHG | BODY MASS INDEX: 25.49 KG/M2 | WEIGHT: 135 LBS | OXYGEN SATURATION: 99 % | RESPIRATION RATE: 18 BRPM | DIASTOLIC BLOOD PRESSURE: 72 MMHG

## 2024-03-12 DIAGNOSIS — F31.81 BIPOLAR 2 DISORDER (H): ICD-10-CM

## 2024-03-12 DIAGNOSIS — N62 MACROMASTIA: ICD-10-CM

## 2024-03-12 DIAGNOSIS — Z91.09 ENVIRONMENTAL ALLERGIES: ICD-10-CM

## 2024-03-12 DIAGNOSIS — E66.3 OVERWEIGHT: ICD-10-CM

## 2024-03-12 DIAGNOSIS — L70.9 ACNE, UNSPECIFIED ACNE TYPE: ICD-10-CM

## 2024-03-12 DIAGNOSIS — Z01.818 PRE-OP EXAM: Primary | ICD-10-CM

## 2024-03-12 PROBLEM — E66.811 CLASS 1 OBESITY WITHOUT SERIOUS COMORBIDITY WITH BODY MASS INDEX (BMI) OF 33.0 TO 33.9 IN ADULT: Status: RESOLVED | Noted: 2023-02-21 | Resolved: 2024-03-12

## 2024-03-12 LAB — HGB BLD-MCNC: 13.4 G/DL (ref 11.7–15.7)

## 2024-03-12 PROCEDURE — 80048 BASIC METABOLIC PNL TOTAL CA: CPT | Performed by: NURSE PRACTITIONER

## 2024-03-12 PROCEDURE — 99214 OFFICE O/P EST MOD 30 MIN: CPT | Performed by: NURSE PRACTITIONER

## 2024-03-12 PROCEDURE — 85018 HEMOGLOBIN: CPT | Performed by: NURSE PRACTITIONER

## 2024-03-12 PROCEDURE — 36415 COLL VENOUS BLD VENIPUNCTURE: CPT | Performed by: NURSE PRACTITIONER

## 2024-03-12 NOTE — PROGRESS NOTES
Preoperative Evaluation  [facility]  [address]  [facility] [address]  Phone: [phone]  Fax: [phone]  Primary Provider: Siobhan Covington  Pre-op Performing Provider: SIOBHAN COVINGTON  Mar 12, 2024       Kerri is a 28 year old, presenting for the following:  Pre-Op Exam        12/20/2023    12:53 PM   Additional Questions   Roomed by Jennifer TONEY     Surgical Information  Surgery/Procedure: Breast reduction  Surgery Location: [facility]  Surgeon: Dr Pollock  Surgery Date: 4-4-24  Time of Surgery: unsure  Where patient plans to recover: At home with family  Fax number for surgical facility: Note does not need to be faxed, will be available electronically in Epic.    Assessment & Plan     The proposed surgical procedure is considered INTERMEDIATE risk.    Pre-op exam  Preop exam performed.  General contraindications for surgery.  Patient hold NSAIDs for 7 days prior to surgery.  - Basic metabolic panel  (Ca, Cl, CO2, Creat, Gluc, K, Na, BUN)  - Hemoglobin  - Basic metabolic panel  (Ca, Cl, CO2, Creat, Gluc, K, Na, BUN)  - Hemoglobin    Macromastia  Recommend over-the-counter acetaminophen as needed for pain.    Bipolar 2 disorder (H)  Patient continues Abilify and Lamictal.    Environmental allergies  Patient continues loratadine.    Acne, unspecified acne type  Patient will hold spironolactone the morning of surgery.  May resume postop per surgeon.    Overweight  Patient continues topiramate.  She will hold phentermine for 7 days prior to surgery.        - No identified additional risk factors other than previously addressed    Antiplatelet or Anticoagulation Medication Instructions   - Patient is on no antiplatelet or anticoagulation medications.    Additional Medication Instructions   - Diuretics: HOLD on the day of surgery.   - Antiepileptics: Continue without modification.   - SSRIs, SNRIs, TCAs, Antipsychotics: Continue without modification.    - phentermine:  HOLD 7 days prior to surgery.    Recommendation  APPROVAL GIVEN to proceed with proposed procedure, without further diagnostic evaluation.      Subjective       HPI related to upcoming procedure: Patient presents for a pre-op for breast reduction.  She has macromastia and experiences thoracic back pain daily.  She takes ibuprofen as needed for the pain. Patient has a history of bipolar and depression.  She was seeing psychiatry and is taking Abilify and Lamictal. She feels as though her mood is stable.  She denies thoughts of suicide.  Patient is seeing the weight loss clinic and is taking topiramate and phentermine.   Patient is taking spironolactone for acne.  She has been taking loratadine for allergies.         3/12/2024     3:51 PM   Preop Questions   1. Have you ever had a heart attack or stroke? No   2. Have you ever had surgery on your heart or blood vessels, such as a stent placement, a coronary artery bypass, or surgery on an artery in your head, neck, heart, or legs? No   3. Do you have chest pain with activity? No   4. Do you have a history of  heart failure? No   5. Do you currently have a cold, bronchitis or symptoms of other infection? No   6. Do you have a cough, shortness of breath, or wheezing? No   7. Do you or anyone in your family have previous history of blood clots? No   8. Do you or does anyone in your family have a serious bleeding problem such as prolonged bleeding following surgeries or cuts? No   9. Have you ever had problems with anemia or been told to take iron pills? No   10. Have you had any abnormal blood loss such as black, tarry or bloody stools, or abnormal vaginal bleeding? No   11. Have you ever had a blood transfusion? No   12. Are you willing to have a blood transfusion if it is medically needed before, during, or after your surgery? Yes   13. Have you or any of your relatives ever had problems with anesthesia? No   14. Do you have sleep apnea, excessive snoring or daytime  drowsiness? No   15. Do you have any artifical heart valves or other implanted medical devices like a pacemaker, defibrillator, or continuous glucose monitor? No   16. Do you have artificial joints? No   17. Are you allergic to latex? No   18. Is there any chance that you may be pregnant? No       Health Care Directive  Patient does not have a Health Care Directive or Living Will: Discussed advance care planning with patient; information given to patient to review.    Preoperative Review of    reviewed - controlled substances prescribed by other outside provider(s).      Status of Chronic Conditions:  See problem list for active medical problems.  Problems all longstanding and stable, except as noted/documented.  See ROS for pertinent symptoms related to these conditions.    Patient Active Problem List    Diagnosis Date Noted    Macromastia 01/03/2024     Priority: Medium    Bipolar 2 disorder (H) 01/23/2023     Priority: Medium    Environmental allergies 01/23/2023     Priority: Medium    Acne 12/09/2019     Priority: Medium    Smoker 12/09/2019     Priority: Medium      Past Medical History:   Diagnosis Date    Bipolar 2 disorder (H) 01/23/2023     No past surgical history on file.  Current Outpatient Medications   Medication Sig Dispense Refill    ARIPiprazole (ABILIFY) 5 MG tablet Take 0.5 tablets (2.5 mg) by mouth daily 45 tablet 0    Cholecalciferol (VITAMIN D-3) 125 MCG (5000 UT) TABS Take 1 capsule by mouth daily 30 tablet 3    lactobacillus rhamnosus, GG, (CULTURELL) capsule Take 1 capsule by mouth 2 times daily      lamoTRIgine (LAMICTAL) 100 MG tablet Take 1 tablet (100 mg) by mouth daily for 14 days, THEN 2 tablets (200 mg) daily for 30 days. 74 tablet 0    loratadine (CLARITIN) 10 MG tablet Take 10 mg by mouth daily as needed for allergies      multivitamin w/minerals (MULTI-VITAMIN) tablet Take 1 tablet by mouth daily      norgestimate-ethinyl estradiol (ORTHO-CYCLEN) 0.25-35 MG-MCG tablet Take 1  "tablet by mouth daily 84 tablet 3    phentermine (ADIPEX-P) 15 MG capsule Take 1 capsule (15 mg) by mouth every morning 30 capsule 3    spironolactone (ALDACTONE) 100 MG tablet Take 1 tablet (100 mg) by mouth daily 90 tablet 3    topiramate (TOPAMAX) 25 MG tablet Take  75mg at bedtime 90 tablet 2    lamoTRIgine (LAMICTAL) 25 MG tablet Take 1 tablet (25 mg) by mouth daily for 14 days, THEN 2 tablets (50 mg) daily for 14 days. 42 tablet 0       Allergies   Allergen Reactions    Nuts Other (See Comments)     Tree nuts  Other reaction(s): Other (See Comments)  Tree nuts        Social History     Tobacco Use    Smoking status: Former     Packs/day: 0.50     Years: 5.00     Additional pack years: 0.00     Total pack years: 2.50     Types: Cigarettes     Quit date: 5/1/2020     Years since quitting: 3.8     Passive exposure: Current    Smokeless tobacco: Former   Substance Use Topics    Alcohol use: Yes     Alcohol/week: 12.0 standard drinks of alcohol     Types: 12 Cans of beer per week     Family History   Problem Relation Age of Onset    Depression Mother     Anxiety Disorder Father     Other Cancer Maternal Grandfather     Diabetes Paternal Grandmother     Anxiety Disorder Sister      History   Drug Use Unknown         Review of Systems    Review of Systems  Constitutional, HEENT, cardiovascular, pulmonary, gi and gu systems are negative, except as otherwise noted.    Objective    /72   Pulse 82   Temp 98  F (36.7  C) (Temporal)   Resp 18   Ht 1.549 m (5' 1\")   Wt 61.2 kg (135 lb)   LMP  (LMP Unknown)   SpO2 99%   BMI 25.51 kg/m     Estimated body mass index is 25.51 kg/m  as calculated from the following:    Height as of this encounter: 1.549 m (5' 1\").    Weight as of this encounter: 61.2 kg (135 lb).  Physical Exam  GENERAL: alert and no distress  EYES: Eyes grossly normal to inspection, PERRL and conjunctivae and sclerae normal  HENT: ear canals and TM's normal, nose and mouth without ulcers or " lesions  NECK: no adenopathy, no asymmetry, masses, or scars  RESP: lungs clear to auscultation - no rales, rhonchi or wheezes  CV: regular rate and rhythm, normal S1 S2, no S3 or S4, no murmur, click or rub, no peripheral edema  ABDOMEN: soft, nontender, no hepatosplenomegaly, no masses and bowel sounds normal  MS: no gross musculoskeletal defects noted, no edema  SKIN: no suspicious lesions or rashes  NEURO: Normal strength and tone, mentation intact and speech normal  PSYCH: mentation appears normal, affect normal/bright    Recent Labs   Lab Test 09/14/23  0946 06/20/23  0744 03/06/23  0855   HGB 14.0  --  14.0   PLT  --   --  263   NA  --  139 138   POTASSIUM  --  4.3 3.9   CR  --  0.70 0.77   A1C  --   --  5.6        Diagnostics  Labs pending at this time.  Results will be reviewed when available.   No EKG required, no history of coronary heart disease, significant arrhythmia, peripheral arterial disease or other structural heart disease.    Revised Cardiac Risk Index (RCRI)  The patient has the following serious cardiovascular risks for perioperative complications:   - No serious cardiac risks = 0 points     RCRI Interpretation: 0 points: Class I (very low risk - 0.4% complication rate)         Signed Electronically by: ABILIO Lima CNP  Copy of this evaluation report is provided to requesting physician.

## 2024-03-13 ENCOUNTER — OFFICE VISIT (OUTPATIENT)
Dept: PLASTIC SURGERY | Facility: CLINIC | Age: 29
End: 2024-03-13
Payer: COMMERCIAL

## 2024-03-13 VITALS
DIASTOLIC BLOOD PRESSURE: 83 MMHG | WEIGHT: 137 LBS | HEIGHT: 61 IN | OXYGEN SATURATION: 100 % | SYSTOLIC BLOOD PRESSURE: 117 MMHG | HEART RATE: 71 BPM | BODY MASS INDEX: 25.86 KG/M2

## 2024-03-13 DIAGNOSIS — N62 LARGE BREASTS: Primary | ICD-10-CM

## 2024-03-13 LAB
ANION GAP SERPL CALCULATED.3IONS-SCNC: 13 MMOL/L (ref 7–15)
BUN SERPL-MCNC: 8.5 MG/DL (ref 6–20)
CALCIUM SERPL-MCNC: 9.9 MG/DL (ref 8.6–10)
CHLORIDE SERPL-SCNC: 105 MMOL/L (ref 98–107)
CREAT SERPL-MCNC: 0.74 MG/DL (ref 0.51–0.95)
DEPRECATED HCO3 PLAS-SCNC: 21 MMOL/L (ref 22–29)
EGFRCR SERPLBLD CKD-EPI 2021: >90 ML/MIN/1.73M2
GLUCOSE SERPL-MCNC: 84 MG/DL (ref 70–99)
POTASSIUM SERPL-SCNC: 4.1 MMOL/L (ref 3.4–5.3)
SODIUM SERPL-SCNC: 139 MMOL/L (ref 135–145)

## 2024-03-13 PROCEDURE — 99214 OFFICE O/P EST MOD 30 MIN: CPT | Performed by: PLASTIC SURGERY

## 2024-03-13 ASSESSMENT — PAIN SCALES - GENERAL: PAINLEVEL: NO PAIN (0)

## 2024-03-13 NOTE — PROGRESS NOTES
PREOPERATIVE VISIT NOTE     PRESENTING COMPLAINT:  Preop visit for upcoming bilateral breast reduction on 4/4/2024     HISTORY OF PRESENTING COMPLAINT: The patient is here for a pre-op visit for the patient's surgery.  The patient is being seen in the presence of my nurse. There is no change since the patient's consultation. Please see the consult note for details.     No change in history physical exam after seeing my PA on 12/19/2023.  Plan is a C- D cup.  Schnurr scale is 3 and 30 g.  She has been off of all nicotine products for over 6 weeks.     ASSESSMENT AND PLAN:  Based upon the above findings, the patient is here for a preop visit for upcoming surgery.  I had a mikhail, detailed discussion with the patient in the presence of my nurse (who was present from beginning to end) about the proposed surgery. I was very clear and detailed about overview of surgery, perioperative plans, and expectations. All risks, benefits and alternatives of the surgery including but not limited to pain, infection, bleeding, scarring, asymmetry, seromas, hematomas, wound breakdown, wound dehiscence, prominent scar, hypertrophic scar, keloid scar, requirement of further surgeries, skin/tissue necrosis, nerve/muscle/deeper structure injury, nipple graft, nipple necrosis, rehypertrophy, inability to breast-feed, inability to promise cup size, DVT, PE, MI, CVA, pneumonia, renal failure and death, were explained in detail. The patient agreed and understood them all and had all the questions answered to the patient's satisfaction, and the patient wants to proceed with surgery. I look forward to helping the patient out in the near future.  All questions were answered.  The patient was happy with the visit.    Total time spent in the encounter today including chart review, visit itself, and post-visit paperwork was 30 minutes.

## 2024-03-13 NOTE — NURSING NOTE
Pre Op Teaching Note:       Pre and Post op Patient Education    Relevant Diagnosis:  macromastia  Surgical procedure:  BRM    Patient demonstrates understanding of the following:  Date of surgery:  4/4/24  Location of surgery:  [facility] - 5th Floor  History and Physical and any other testing necessary prior to surgery: Yes, discussed with pt need for pre-op within 30 days of surgery with PCP.    Patient demonstrates understanding of the following:    Pre-op showering/scrub information with PCMX Soap: Yes, soap given in clinic today  Blood thinner medications discussed and when to stop (if applicable):  Per PCP at pre-op.     Pt aware of need to stop phenteramine 7 days prior to surgery.    Post-op follow-up:  Discussed how to contact the hospital, nurse, and clinic scheduling staff if necessary. (See packet information)    Instructional materials used/given/mailed:  [facility] Packet per surgery scheduler, post op teaching sheet, Soap.  Pt advised that final arrival information to come closer to the surgery date by [facility] nurses.

## 2024-03-13 NOTE — LETTER
3/13/2024       RE: Cindy Contreras  [address] [facility] [address]       Dear Colleague,    Thank you for referring your patient, Cnidy Contreras, to the [facility] [place] at [facility]. Please see a copy of my visit note below.    PREOPERATIVE VISIT NOTE     PRESENTING COMPLAINT:  Preop visit for upcoming bilateral breast reduction on 4/4/2024     HISTORY OF PRESENTING COMPLAINT: The patient is here for a pre-op visit for the patient's surgery.  The patient is being seen in the presence of my nurse. There is no change since the patient's consultation. Please see the consult note for details.     No change in history physical exam after seeing my PA on 12/19/2023.  Plan is a C- D cup.  Schnurr scale is 3 and 30 g.  She has been off of all nicotine products for over 6 weeks.     ASSESSMENT AND PLAN:  Based upon the above findings, the patient is here for a preop visit for upcoming surgery.  I had a mikhail, detailed discussion with the patient in the presence of my nurse (who was present from beginning to end) about the proposed surgery. I was very clear and detailed about overview of surgery, perioperative plans, and expectations. All risks, benefits and alternatives of the surgery including but not limited to pain, infection, bleeding, scarring, asymmetry, seromas, hematomas, wound breakdown, wound dehiscence, prominent scar, hypertrophic scar, keloid scar, requirement of further surgeries, skin/tissue necrosis, nerve/muscle/deeper structure injury, nipple graft, nipple necrosis, rehypertrophy, inability to breast-feed, inability to promise cup size, DVT, PE, MI, CVA, pneumonia, renal failure and death, were explained in detail. The patient agreed and understood them all and had all the questions answered to the patient's satisfaction, and the patient wants to proceed with surgery. I look forward to  helping the patient out in the near future.  All questions were answered.  The patient was happy with the visit.    Total time spent in the encounter today including chart review, visit itself, and post-visit paperwork was 30 minutes.       Again, thank you for allowing me to participate in the care of your patient.      Sincerely,    FENG Pollock MD

## 2024-03-13 NOTE — NURSING NOTE
"Chief Complaint   Patient presents with    RECHECK     Kerri, is being seen today for a Pre-op consult - DOS 4/4, confirm continued smoking cessation and stopping phentermine prior to surgery.       Vitals:    03/13/24 0846   BP: 117/83   BP Location: Left arm   Patient Position: Chair   Cuff Size: Adult Regular   Pulse: 71   SpO2: 100%   Weight: 62.1 kg (137 lb)   Height: 1.549 m (5' 1\")       Body mass index is 25.89 kg/m .      Jennifer Quezada LPN    "

## 2024-03-24 ENCOUNTER — TELEPHONE (OUTPATIENT)
Dept: ENDOCRINOLOGY | Facility: CLINIC | Age: 29
End: 2024-03-24
Payer: COMMERCIAL

## 2024-03-24 DIAGNOSIS — E66.811 CLASS 1 OBESITY WITHOUT SERIOUS COMORBIDITY WITH BODY MASS INDEX (BMI) OF 33.0 TO 33.9 IN ADULT, UNSPECIFIED OBESITY TYPE: ICD-10-CM

## 2024-03-27 RX ORDER — PHENTERMINE HYDROCHLORIDE 15 MG/1
15 CAPSULE ORAL EVERY MORNING
Qty: 30 CAPSULE | Refills: 3 | OUTPATIENT
Start: 2024-03-27

## 2024-03-27 NOTE — TELEPHONE ENCOUNTER
Refill denied at this time. Patient needs appointment with Kate Rasheed PA-C. [employer] message sent to patient to schedule appointment.

## 2024-03-27 NOTE — TELEPHONE ENCOUNTER
phentermine 15 MG capsule        Last Written Prescription Date:  8/23/23  Last Fill Quantity: 30,   # refills: 3  Last Office Visit : 8/23/23  Future Office visit:  3 months     Received refill request for      phentermine 15 MG capsule    . Patient needs appointment scheduled prior to any refills. Clinic Coordinator notified and will follow up with the patient as appropriate. The pharmacy has been notified that the medication will not be refilled prior to an appointment being scheduled.

## 2024-04-02 ENCOUNTER — ANESTHESIA EVENT (OUTPATIENT)
Dept: SURGERY | Facility: AMBULATORY SURGERY CENTER | Age: 29
End: 2024-04-02
Payer: COMMERCIAL

## 2024-04-02 NOTE — ANESTHESIA PREPROCEDURE EVALUATION
Anesthesia Pre-Procedure Evaluation    Patient: Cindy Contreras   MRN: [id number] : 1995        Procedure : Procedure(s):  MAMMOPLASTY, REDUCTION, BILATERAL          Past Medical History:   Diagnosis Date    Bipolar 2 disorder (H) 2023      No past surgical history on file.   Allergies   Allergen Reactions    Nuts Other (See Comments)     Tree nuts  Other reaction(s): Other (See Comments)  Tree nuts      Social History     Tobacco Use    Smoking status: Former     Packs/day: 0.50     Years: 5.00     Additional pack years: 0.00     Total pack years: 2.50     Types: Cigarettes     Quit date: 2020     Years since quitting: 3.9     Passive exposure: Current    Smokeless tobacco: Former   Substance Use Topics    Alcohol use: Yes     Alcohol/week: 12.0 standard drinks of alcohol     Types: 12 Cans of beer per week      Wt Readings from Last 1 Encounters:   24 62.1 kg (137 lb)        Anesthesia Evaluation   Pt has had prior anesthetic.     No history of anesthetic complications       ROS/MED HX  ENT/Pulmonary:  - neg pulmonary ROS     Neurologic:  - neg neurologic ROS     Cardiovascular:  - neg cardiovascular ROS     METS/Exercise Tolerance: >4 METS    Hematologic:  - neg hematologic  ROS     Musculoskeletal:  - neg musculoskeletal ROS     GI/Hepatic:  - neg GI/hepatic ROS     Renal/Genitourinary:  - neg Renal ROS     Endo:  - neg endo ROS     Psychiatric/Substance Use:  - neg psychiatric ROS   (+) psychiatric history bipolar       Infectious Disease:  - neg infectious disease ROS     Malignancy:  - neg malignancy ROS     Other:  - neg other ROS          Physical Exam    Airway        Mallampati: II   TM distance: > 3 FB   Neck ROM: full   Mouth opening: > 3 cm    Respiratory Devices and Support         Dental       (+) Completely normal teeth      Cardiovascular   cardiovascular exam normal          Pulmonary   pulmonary exam normal                OUTSIDE LABS:  CBC:   Lab Results   Component  "Value Date    WBC 8.3 03/06/2023    HGB 13.4 03/12/2024    HGB 14.0 09/14/2023    HCT 42.4 03/06/2023     03/06/2023     BMP:   Lab Results   Component Value Date     03/12/2024     06/20/2023    POTASSIUM 4.1 03/12/2024    POTASSIUM 4.3 06/20/2023    CHLORIDE 105 03/12/2024    CHLORIDE 104 06/20/2023    CO2 21 (L) 03/12/2024    CO2 21 (L) 06/20/2023    BUN 8.5 03/12/2024    BUN 11.5 06/20/2023    CR 0.74 03/12/2024    CR 0.70 06/20/2023    GLC 84 03/12/2024    GLC 88 09/14/2023     COAGS: No results found for: \"PTT\", \"INR\", \"FIBR\"  POC: No results found for: \"BGM\", \"HCG\", \"HCGS\"  HEPATIC:   Lab Results   Component Value Date    ALBUMIN 4.5 06/20/2023    PROTTOTAL 7.1 06/20/2023    ALT 30 06/20/2023    AST 24 06/20/2023    ALKPHOS 88 06/20/2023    BILITOTAL 0.5 06/20/2023     OTHER:   Lab Results   Component Value Date    A1C 5.6 03/06/2023    RUPAL 9.9 03/12/2024    TSH 1.38 03/06/2023       Anesthesia Plan    ASA Status:  2    NPO Status:  NPO Appropriate    Anesthesia Type: General.     - Airway: LMA   Induction: Intravenous.   Maintenance: Balanced.        Consents    Anesthesia Plan(s) and associated risks, benefits, and realistic alternatives discussed. Questions answered and patient/representative(s) expressed understanding.     - Discussed:     - Discussed with:  Patient            Postoperative Care    Pain management: IV analgesics, Oral pain medications, Multi-modal analgesia, Peripheral nerve block (Single Shot).   PONV prophylaxis: Ondansetron (or other 5HT-3), Dexamethasone or Solumedrol, Background Propofol Infusion     Comments:    Other Comments: Discussed risks of general anesthesia, including aspiration pneumonia, sore throat/hoarse voice, abrasions/damage to lips/tongue/teeth, nausea, rare complications (including medication reactions, cardiac, pulmonary, hypoxia/low oxygen, recall). Ensured understanding, invited questions and all questions were answered. Patient wishes to " "proceed.    Discussed preoperative pectoralis nerve block. Discussed risks of nerve block, including nerve injury, bleeding, infection, incomplete analgesia. Discussed anticipated areas of sensory and motor block, limb precautions, and fall precautions. Discussed alternative of not performing a nerve block. Ensured understanding, invited questions and all questions were answered. Patient wishes to proceed.             Vinita De Los Santos MD    I have reviewed the pertinent notes and labs in the chart from the past 30 days and (re)examined the patient.  Any updates or changes from those notes are reflected in this note.              # Overweight: Estimated body mass index is 25.89 kg/m  as calculated from the following:    Height as of 3/13/24: 1.549 m (5' 1\").    Weight as of 3/13/24: 62.1 kg (137 lb).      "

## 2024-04-04 ENCOUNTER — ANESTHESIA (OUTPATIENT)
Dept: SURGERY | Facility: AMBULATORY SURGERY CENTER | Age: 29
End: 2024-04-04
Payer: COMMERCIAL

## 2024-04-04 ENCOUNTER — HOSPITAL ENCOUNTER (OUTPATIENT)
Facility: AMBULATORY SURGERY CENTER | Age: 29
Discharge: HOME | End: 2024-04-04
Attending: PLASTIC SURGERY
Payer: COMMERCIAL

## 2024-04-04 VITALS
HEIGHT: 61 IN | BODY MASS INDEX: 24.55 KG/M2 | OXYGEN SATURATION: 100 % | TEMPERATURE: 97 F | WEIGHT: 130 LBS | HEART RATE: 67 BPM | DIASTOLIC BLOOD PRESSURE: 59 MMHG | RESPIRATION RATE: 16 BRPM | SYSTOLIC BLOOD PRESSURE: 92 MMHG

## 2024-04-04 DIAGNOSIS — N62 MACROMASTIA: Primary | ICD-10-CM

## 2024-04-04 DIAGNOSIS — F31.81 BIPOLAR 2 DISORDER (H): ICD-10-CM

## 2024-04-04 LAB
HCG UR QL: NEGATIVE
INTERNAL QC OK POCT: NORMAL
POCT KIT EXPIRATION DATE: NORMAL
POCT KIT LOT NUMBER: NORMAL

## 2024-04-04 PROCEDURE — 19318 BREAST REDUCTION: CPT | Mod: 50 | Performed by: PLASTIC SURGERY

## 2024-04-04 PROCEDURE — 19318 BREAST REDUCTION: CPT | Performed by: STUDENT IN AN ORGANIZED HEALTH CARE EDUCATION/TRAINING PROGRAM

## 2024-04-04 PROCEDURE — 88305 TISSUE EXAM BY PATHOLOGIST: CPT | Mod: TC | Performed by: PLASTIC SURGERY

## 2024-04-04 PROCEDURE — 88305 TISSUE EXAM BY PATHOLOGIST: CPT | Mod: 26 | Performed by: PATHOLOGY

## 2024-04-04 PROCEDURE — 19318 BREAST REDUCTION: CPT | Mod: LT

## 2024-04-04 PROCEDURE — 36620 INSERTION CATHETER ARTERY: CPT | Mod: 59 | Performed by: ANESTHESIOLOGY

## 2024-04-04 PROCEDURE — 64450 NJX AA&/STRD OTHER PN/BRANCH: CPT | Mod: 59 | Performed by: STUDENT IN AN ORGANIZED HEALTH CARE EDUCATION/TRAINING PROGRAM

## 2024-04-04 PROCEDURE — 19318 BREAST REDUCTION: CPT | Performed by: NURSE ANESTHETIST, CERTIFIED REGISTERED

## 2024-04-04 PROCEDURE — 81025 URINE PREGNANCY TEST: CPT | Performed by: PATHOLOGY

## 2024-04-04 RX ORDER — LIDOCAINE 40 MG/G
CREAM TOPICAL
Status: DISCONTINUED | OUTPATIENT
Start: 2024-04-04 | End: 2024-04-04 | Stop reason: HOSPADM

## 2024-04-04 RX ORDER — ONDANSETRON 2 MG/ML
4 INJECTION INTRAMUSCULAR; INTRAVENOUS EVERY 30 MIN PRN
Status: DISCONTINUED | OUTPATIENT
Start: 2024-04-04 | End: 2024-04-05 | Stop reason: HOSPADM

## 2024-04-04 RX ORDER — ONDANSETRON 4 MG/1
4 TABLET, ORALLY DISINTEGRATING ORAL EVERY 30 MIN PRN
Status: DISCONTINUED | OUTPATIENT
Start: 2024-04-04 | End: 2024-04-04 | Stop reason: HOSPADM

## 2024-04-04 RX ORDER — BUPIVACAINE HYDROCHLORIDE AND EPINEPHRINE 2.5; 5 MG/ML; UG/ML
INJECTION, SOLUTION INFILTRATION; PERINEURAL
Status: COMPLETED | OUTPATIENT
Start: 2024-04-04 | End: 2024-04-04

## 2024-04-04 RX ORDER — HYDROMORPHONE HYDROCHLORIDE 1 MG/ML
0.4 INJECTION, SOLUTION INTRAMUSCULAR; INTRAVENOUS; SUBCUTANEOUS EVERY 5 MIN PRN
Status: DISCONTINUED | OUTPATIENT
Start: 2024-04-04 | End: 2024-04-04 | Stop reason: HOSPADM

## 2024-04-04 RX ORDER — FENTANYL CITRATE 50 UG/ML
25-50 INJECTION, SOLUTION INTRAMUSCULAR; INTRAVENOUS
Status: DISCONTINUED | OUTPATIENT
Start: 2024-04-04 | End: 2024-04-04 | Stop reason: HOSPADM

## 2024-04-04 RX ORDER — LIDOCAINE HYDROCHLORIDE 20 MG/ML
INJECTION, SOLUTION INFILTRATION; PERINEURAL PRN
Status: DISCONTINUED | OUTPATIENT
Start: 2024-04-04 | End: 2024-04-04

## 2024-04-04 RX ORDER — DEXAMETHASONE SODIUM PHOSPHATE 4 MG/ML
INJECTION, SOLUTION INTRA-ARTICULAR; INTRALESIONAL; INTRAMUSCULAR; INTRAVENOUS; SOFT TISSUE PRN
Status: DISCONTINUED | OUTPATIENT
Start: 2024-04-04 | End: 2024-04-04

## 2024-04-04 RX ORDER — CEFAZOLIN SODIUM 2 G/50ML
2 SOLUTION INTRAVENOUS
Status: COMPLETED | OUTPATIENT
Start: 2024-04-04 | End: 2024-04-04

## 2024-04-04 RX ORDER — PROPOFOL 10 MG/ML
INJECTION, EMULSION INTRAVENOUS PRN
Status: DISCONTINUED | OUTPATIENT
Start: 2024-04-04 | End: 2024-04-04

## 2024-04-04 RX ORDER — FENTANYL CITRATE 50 UG/ML
50 INJECTION, SOLUTION INTRAMUSCULAR; INTRAVENOUS EVERY 5 MIN PRN
Status: DISCONTINUED | OUTPATIENT
Start: 2024-04-04 | End: 2024-04-04 | Stop reason: HOSPADM

## 2024-04-04 RX ORDER — NALOXONE HYDROCHLORIDE 0.4 MG/ML
0.2 INJECTION, SOLUTION INTRAMUSCULAR; INTRAVENOUS; SUBCUTANEOUS
Status: DISCONTINUED | OUTPATIENT
Start: 2024-04-04 | End: 2024-04-04 | Stop reason: HOSPADM

## 2024-04-04 RX ORDER — FLUMAZENIL 0.1 MG/ML
0.2 INJECTION, SOLUTION INTRAVENOUS
Status: DISCONTINUED | OUTPATIENT
Start: 2024-04-04 | End: 2024-04-04 | Stop reason: HOSPADM

## 2024-04-04 RX ORDER — OXYCODONE HYDROCHLORIDE 5 MG/1
5 TABLET ORAL
Status: COMPLETED | OUTPATIENT
Start: 2024-04-04 | End: 2024-04-04

## 2024-04-04 RX ORDER — NALOXONE HYDROCHLORIDE 0.4 MG/ML
0.4 INJECTION, SOLUTION INTRAMUSCULAR; INTRAVENOUS; SUBCUTANEOUS
Status: DISCONTINUED | OUTPATIENT
Start: 2024-04-04 | End: 2024-04-04 | Stop reason: HOSPADM

## 2024-04-04 RX ORDER — NALOXONE HYDROCHLORIDE 0.4 MG/ML
0.1 INJECTION, SOLUTION INTRAMUSCULAR; INTRAVENOUS; SUBCUTANEOUS
Status: DISCONTINUED | OUTPATIENT
Start: 2024-04-04 | End: 2024-04-04 | Stop reason: HOSPADM

## 2024-04-04 RX ORDER — PROPOFOL 10 MG/ML
INJECTION, EMULSION INTRAVENOUS CONTINUOUS PRN
Status: DISCONTINUED | OUTPATIENT
Start: 2024-04-04 | End: 2024-04-04

## 2024-04-04 RX ORDER — ONDANSETRON 4 MG/1
4 TABLET, ORALLY DISINTEGRATING ORAL EVERY 30 MIN PRN
Status: DISCONTINUED | OUTPATIENT
Start: 2024-04-04 | End: 2024-04-05 | Stop reason: HOSPADM

## 2024-04-04 RX ORDER — NALOXONE HYDROCHLORIDE 0.4 MG/ML
0.1 INJECTION, SOLUTION INTRAMUSCULAR; INTRAVENOUS; SUBCUTANEOUS
Status: DISCONTINUED | OUTPATIENT
Start: 2024-04-04 | End: 2024-04-05 | Stop reason: HOSPADM

## 2024-04-04 RX ORDER — MAGNESIUM HYDROXIDE 1200 MG/15ML
LIQUID ORAL PRN
Status: DISCONTINUED | OUTPATIENT
Start: 2024-04-04 | End: 2024-04-04 | Stop reason: HOSPADM

## 2024-04-04 RX ORDER — SODIUM CHLORIDE, SODIUM LACTATE, POTASSIUM CHLORIDE, CALCIUM CHLORIDE 600; 310; 30; 20 MG/100ML; MG/100ML; MG/100ML; MG/100ML
INJECTION, SOLUTION INTRAVENOUS CONTINUOUS
Status: DISCONTINUED | OUTPATIENT
Start: 2024-04-04 | End: 2024-04-04 | Stop reason: HOSPADM

## 2024-04-04 RX ORDER — OXYCODONE HYDROCHLORIDE 5 MG/1
5-10 TABLET ORAL EVERY 6 HOURS PRN
Qty: 20 TABLET | Refills: 0 | Status: SHIPPED | OUTPATIENT
Start: 2024-04-04

## 2024-04-04 RX ORDER — HYDROMORPHONE HYDROCHLORIDE 1 MG/ML
0.2 INJECTION, SOLUTION INTRAMUSCULAR; INTRAVENOUS; SUBCUTANEOUS EVERY 5 MIN PRN
Status: DISCONTINUED | OUTPATIENT
Start: 2024-04-04 | End: 2024-04-04 | Stop reason: HOSPADM

## 2024-04-04 RX ORDER — ACETAMINOPHEN 325 MG/1
975 TABLET ORAL ONCE
Status: COMPLETED | OUTPATIENT
Start: 2024-04-04 | End: 2024-04-04

## 2024-04-04 RX ORDER — ONDANSETRON 2 MG/ML
4 INJECTION INTRAMUSCULAR; INTRAVENOUS EVERY 30 MIN PRN
Status: DISCONTINUED | OUTPATIENT
Start: 2024-04-04 | End: 2024-04-04 | Stop reason: HOSPADM

## 2024-04-04 RX ORDER — FENTANYL CITRATE 50 UG/ML
25 INJECTION, SOLUTION INTRAMUSCULAR; INTRAVENOUS EVERY 5 MIN PRN
Status: DISCONTINUED | OUTPATIENT
Start: 2024-04-04 | End: 2024-04-04 | Stop reason: HOSPADM

## 2024-04-04 RX ORDER — CEFAZOLIN SODIUM 2 G/50ML
2 SOLUTION INTRAVENOUS SEE ADMIN INSTRUCTIONS
Status: DISCONTINUED | OUTPATIENT
Start: 2024-04-04 | End: 2024-04-04 | Stop reason: HOSPADM

## 2024-04-04 RX ORDER — GLYCOPYRROLATE 0.2 MG/ML
INJECTION, SOLUTION INTRAMUSCULAR; INTRAVENOUS PRN
Status: DISCONTINUED | OUTPATIENT
Start: 2024-04-04 | End: 2024-04-04

## 2024-04-04 RX ORDER — OXYCODONE HYDROCHLORIDE 5 MG/1
10 TABLET ORAL
Status: DISCONTINUED | OUTPATIENT
Start: 2024-04-04 | End: 2024-04-05 | Stop reason: HOSPADM

## 2024-04-04 RX ORDER — ONDANSETRON 2 MG/ML
INJECTION INTRAMUSCULAR; INTRAVENOUS PRN
Status: DISCONTINUED | OUTPATIENT
Start: 2024-04-04 | End: 2024-04-04

## 2024-04-04 RX ORDER — ONDANSETRON 4 MG/1
4 TABLET, ORALLY DISINTEGRATING ORAL EVERY 8 HOURS PRN
Qty: 4 TABLET | Refills: 0 | Status: SHIPPED | OUTPATIENT
Start: 2024-04-04

## 2024-04-04 RX ORDER — AMOXICILLIN 250 MG
1-2 CAPSULE ORAL 2 TIMES DAILY
Qty: 30 TABLET | Refills: 0 | Status: SHIPPED | OUTPATIENT
Start: 2024-04-04

## 2024-04-04 RX ADMIN — FENTANYL CITRATE 25 MCG: 50 INJECTION, SOLUTION INTRAMUSCULAR; INTRAVENOUS at 09:58

## 2024-04-04 RX ADMIN — FENTANYL CITRATE 25 MCG: 50 INJECTION, SOLUTION INTRAMUSCULAR; INTRAVENOUS at 09:50

## 2024-04-04 RX ADMIN — SODIUM CHLORIDE, SODIUM LACTATE, POTASSIUM CHLORIDE, CALCIUM CHLORIDE: 600; 310; 30; 20 INJECTION, SOLUTION INTRAVENOUS at 07:14

## 2024-04-04 RX ADMIN — FENTANYL CITRATE 25 MCG: 50 INJECTION, SOLUTION INTRAMUSCULAR; INTRAVENOUS at 10:12

## 2024-04-04 RX ADMIN — Medication 100 MCG: at 08:11

## 2024-04-04 RX ADMIN — FENTANYL CITRATE 50 MCG: 50 INJECTION, SOLUTION INTRAMUSCULAR; INTRAVENOUS at 07:43

## 2024-04-04 RX ADMIN — PROPOFOL 200 MG: 10 INJECTION, EMULSION INTRAVENOUS at 07:18

## 2024-04-04 RX ADMIN — FENTANYL CITRATE 50 MCG: 50 INJECTION, SOLUTION INTRAMUSCULAR; INTRAVENOUS at 06:58

## 2024-04-04 RX ADMIN — PROPOFOL 200 MG: 10 INJECTION, EMULSION INTRAVENOUS at 07:17

## 2024-04-04 RX ADMIN — Medication 100 MCG: at 08:29

## 2024-04-04 RX ADMIN — PROPOFOL 200 MCG/KG/MIN: 10 INJECTION, EMULSION INTRAVENOUS at 07:18

## 2024-04-04 RX ADMIN — BUPIVACAINE HYDROCHLORIDE AND EPINEPHRINE 40 ML: 2.5; 5 INJECTION, SOLUTION INFILTRATION; PERINEURAL at 07:05

## 2024-04-04 RX ADMIN — Medication 0.5 MG: at 07:39

## 2024-04-04 RX ADMIN — ACETAMINOPHEN 975 MG: 325 TABLET ORAL at 06:40

## 2024-04-04 RX ADMIN — Medication 100 MCG: at 08:39

## 2024-04-04 RX ADMIN — FENTANYL CITRATE 50 MCG: 50 INJECTION, SOLUTION INTRAMUSCULAR; INTRAVENOUS at 07:04

## 2024-04-04 RX ADMIN — CEFAZOLIN SODIUM 2 G: 2 SOLUTION INTRAVENOUS at 07:14

## 2024-04-04 RX ADMIN — LIDOCAINE HYDROCHLORIDE 60 MG: 20 INJECTION, SOLUTION INFILTRATION; PERINEURAL at 07:17

## 2024-04-04 RX ADMIN — LIDOCAINE HYDROCHLORIDE 100 MG: 20 INJECTION, SOLUTION INFILTRATION; PERINEURAL at 07:18

## 2024-04-04 RX ADMIN — OXYCODONE HYDROCHLORIDE 5 MG: 5 TABLET ORAL at 10:05

## 2024-04-04 RX ADMIN — Medication 100 MCG: at 08:06

## 2024-04-04 RX ADMIN — DEXAMETHASONE SODIUM PHOSPHATE 4 MG: 4 INJECTION, SOLUTION INTRA-ARTICULAR; INTRALESIONAL; INTRAMUSCULAR; INTRAVENOUS; SOFT TISSUE at 07:23

## 2024-04-04 RX ADMIN — Medication 100 MCG: at 07:31

## 2024-04-04 RX ADMIN — ONDANSETRON 4 MG: 2 INJECTION INTRAMUSCULAR; INTRAVENOUS at 08:56

## 2024-04-04 RX ADMIN — FENTANYL CITRATE 50 MCG: 50 INJECTION, SOLUTION INTRAMUSCULAR; INTRAVENOUS at 08:56

## 2024-04-04 RX ADMIN — GLYCOPYRROLATE 0.2 MG: 0.2 INJECTION, SOLUTION INTRAMUSCULAR; INTRAVENOUS at 07:23

## 2024-04-04 RX ADMIN — FENTANYL CITRATE 25 MCG: 50 INJECTION, SOLUTION INTRAMUSCULAR; INTRAVENOUS at 09:41

## 2024-04-04 NOTE — OR NURSING
[facility] OR [address]  5TH FLOOR  [facility] [address]  [phone]  [phone]    2024    Cindy M Carr  [address] [address]  [phone] (home)     :  1995    To Whom it May Concern:    Cindy Contreras had surgery on 2024. No driving for 3-4 weeks and no lifting of more than 10 pounds for 6 weeks, or as indicated by provider.     Please contact me for any questions or concerns.    Sincerely,    Catherine Pollock MD  Plastic Surgery  [address] [place] [id number]  [phone]    KYLE Emmanuel

## 2024-04-04 NOTE — ANESTHESIA PROCEDURE NOTES
Pectoralis Procedure Note    Pre-Procedure   Staff -        Anesthesiologist:  Demarco Genao MD       Performed By: anesthesiologist       Location: pre-op       Procedure Start/Stop Times: 4/4/2024 7:05 AM and 4/4/2024 7:10 AM       Pre-Anesthestic Checklist: patient identified, IV checked, site marked, risks and benefits discussed, informed consent, monitors and equipment checked, pre-op evaluation, at physician/surgeon's request and post-op pain management  Timeout:       Correct Patient: Yes        Correct Procedure: Yes        Correct Site: Yes        Correct Position: Yes        Correct Laterality: Yes        Site Marked: Yes  Procedure Documentation  Procedure: Pectoralis             Pectoralis II and Pectoralis I       Laterality: bilateral       Patient Position: supine       Skin prep: Chloraprep       Needle Type: short bevel       Needle Gauge: 17.        Needle Length (millimeters): 100        Ultrasound guided       1. Ultrasound was used to identify targeted nerve, plexus, vascular marker, or fascial plane and place a needle adjacent to it in real-time.       2. Ultrasound was used to visualize the spread of anesthetic in close proximity to the above referenced structure.       3. A permanent image is entered into the patient's record.       4. The visualized anatomic structures appeared normal.       5. There were no apparent abnormal pathologic findings.    Assessment/Narrative         The placement was negative for: blood aspirated, painful injection and site bleeding       Paresthesias: No.       Insertion/Infusion Method: Single Shot       Complications: none    Medication(s) Administered   Bupivacaine 0.25% w/ 1:200K Epi (Injection) - Injection   40 mL - 4/4/2024 7:05:00 AM  Bupivacaine liposome (Exparel) 1.3% LA inj susp (Infiltration) - Infiltration   20 mL - 4/4/2024 7:05:00 AM  Medication Administration Time: 4/4/2024 7:05 AM     Comments:  Risk benefit alternatives explained. Patient  "agrees to undergo a block.   Procedure done with no issues, no complications, good visualization under US, local anesthetic spread satisfactory. Patient tolerated the procedure well.        FOR [facility] ([facility] ONLY:   Pain Team Contact information: please page the Pain Team Via [employer]. Search \"Pain\". During daytime hours, please page the attending first. At night please page the resident first.      "

## 2024-04-04 NOTE — ANESTHESIA CARE TRANSFER NOTE
Patient: Cindy Contreras    Procedure: Procedure(s):  MAMMOPLASTY, REDUCTION, BILATERAL       Diagnosis: Macromastia [N62]  Diagnosis Additional Information: No value filed.    Anesthesia Type:   General     Note:    Oropharynx: oropharynx clear of all foreign objects and spontaneously breathing  Level of Consciousness: awake  Oxygen Supplementation: face mask  Level of Supplemental Oxygen (L/min / FiO2): 6  Independent Airway: airway patency satisfactory and stable  Dentition: dentition unchanged  Vital Signs Stable: post-procedure vital signs reviewed and stable  Report to RN Given: handoff report given  Patient transferred to: PACU    Handoff Report: Identifed the Patient, Identified the Reponsible Provider, Reviewed the pertinent medical history, Discussed the surgical course, Reviewed Intra-OP anesthesia mangement and issues during anesthesia, Set expectations for post-procedure period and Allowed opportunity for questions and acknowledgement of understanding    Vitals:  Vitals Value Taken Time   BP 96/62 04/04/24 0928   Temp     Pulse 85 04/04/24 0932   Resp 12 04/04/24 0932   SpO2 100 % 04/04/24 0932   Vitals shown include unfiled device data.    Electronically Signed By: ABILIO Villeda CRNA  April 4, 2024  9:33 AM

## 2024-04-04 NOTE — ANESTHESIA POSTPROCEDURE EVALUATION
Patient: Cindy Contreras    Procedure: Procedure(s):  MAMMOPLASTY, REDUCTION, BILATERAL       Anesthesia Type:  General    Note:  Disposition: Outpatient   Postop Pain Control: Uneventful            Sign Out: Well controlled pain   PONV: No   Neuro/Psych: Uneventful            Sign Out: Acceptable/Baseline neuro status   Airway/Respiratory: Uneventful            Sign Out: Acceptable/Baseline resp. status   CV/Hemodynamics: Uneventful            Sign Out: Acceptable CV status; No obvious hypovolemia; No obvious fluid overload   Other NRE: NONE   DID A NON-ROUTINE EVENT OCCUR? No           Last vitals:  Vitals Value Taken Time   BP 97/66 04/04/24 1010   Temp 36.2  C (97.1  F) 04/04/24 1010   Pulse 77 04/04/24 1010   Resp 19 04/04/24 1010   SpO2 96 % 04/04/24 1009   Vitals shown include unfiled device data.    Electronically Signed By: Demarco Genao MD  April 4, 2024  11:27 AM

## 2024-04-04 NOTE — BRIEF OP NOTE
[facility]    Brief Operative Note    Pre-operative diagnosis: Macromastia [N62]  Post-operative diagnosis S/p breast reduction     Procedure: MAMMOPLASTY, REDUCTION, BILATERAL, Bilateral - Breast    Surgeon: Surgeon(s) and Role:     * FENG Pollock MD - Primary     * Catherine Alcantar MD - Resident - Assisting  Anesthesia: General with Block   Estimated Blood Loss: Less than 200 ml    Drains: None  Specimens:   ID Type Source Tests Collected by Time Destination   1 : Left breast tissue Reduction Breast, Left SURGICAL PATHOLOGY EXAM FENG Pollock MD 4/4/2024  8:44 AM    2 : right breast tissue Reduction Breast, Right SURGICAL PATHOLOGY EXAM FENG Pollock MD 4/4/2024  8:45 AM      Findings:   None.  Complications: None.  Implants: * No implants in log *        Catherine Alcantar   Plastic & Reconstructive Surgery

## 2024-04-04 NOTE — OR NURSING
Patient received bilateral Pectoralis nerve block  with Exparel.  Fentanyl 100mcg and Versed 2mg given. Tolerated procedure well.

## 2024-04-04 NOTE — DISCHARGE INSTRUCTIONS
Breast Reduction Post Op Instructions     General:     Have someone at home with you for the first 24 hours. You may need additional support for the first week.     Get plenty of rest and eat a balanced diet.     Drink plenty of fluids to help with healing and to help prevent constipation.      Avoid alcohol for a minimum of 3 weeks after surgery as it can cause fluid retention.     No smoking or any nicotine products.      Activity:     Start walking around your house as soon as possible, this will help reduce swelling and decrease risk of blood clots.     You may use your arms for activities of daily living (eating, drinking, dressing, showering, etc.) but avoid overuse of arms as it may increase swelling.      Avoid strenuous activities, no working out for 4-6 weeks post op.    In general, you can drive around 3-4 weeks post op. You must be off all narcotic pain medications and able to maneuver arms quickly to react.      No heavy lifting, nothing greater than 10lbs until 6 weeks post op.     Medications:      You can take Ibuprofen 400-800 mg and Tylenol 650 mg for pain relief. Please take each every 6 hours, and for optimal pain relief - please stagger the medications so that you are taking one or the other every 3 hours. It is best to make a chart/schedule of this to stay organized. If you are taking additional pain medications, please do not exceed 4000 mg of Tylenol daily from all sources.     You have been prescribed additional pain meds such as narcotics and muscle relaxants, please take as instructed and as needed. Take these medications in addition to tylenol/ibuprofen.      If you are taking narcotic medications, please do not operate heavy machinery or drive.      Incision Care:     Incisions have a tape and glue in place. Please leave in place for 2-3 weeks. If it starts to peel off on it s own you can trim back the tape.      Ok to moisturize incisions with aquafor or vaseline around 10 days post op.       Ok to get incisions wet in the shower. No soaking in tubs or baths for 6 weeks or until all incisions/wounds have healed.     Please wear an ace wrap or sports bra for compression for 6 weeks. Avoid underwire bras during this time.     Monitor incisions daily for signs of infection such as spreading redness to skin.     Some drainage from incisions can be expected. Most common area for drainage is under the middle portion of the breast. Use gauze, bandaids or pads as needed to catch this drainage.     What to Expect:    You may experience temporary soreness, bruising, swelling and tightness in the breasts as well as discomfort in the incision area.      You may have decreased sensation to the nipples. Generally this improves with time but can be permanent. Occasionally people have hypersensitivity to the nipples, this will resolve over the next 6 weeks.       You may have random shooting pains, tingling, or other strange sensations in the skin for a few months. These will subside.     The breasts will be firm and swollen initially but will settle and soften over time     Follow up:     Follow up with your Surgeon or Physician Assistant will be 2 weeks after surgery     When to call:     Please call the office and/or consider return to the ER if you experience worsening pain not relieved by medications, increased swelling, redness to skin or high fevers >101F or if there are unexpected problems like shortness of breath.     Contact us on [facility]Place Monday - Friday, 8 a.m. - 4:30 p.m. or call [phone] to speak with our nursing team     After hours and on weekends, call Hospital Paging at [phone], and ask for the Plastic Surgery Resident on call     [facility]  Home Care Following Anesthesia  For 24 hours after surgery:  Get plenty of rest.  A responsible adult must stay with you for at least 24 hours after you leave the surgery center.  Do not drive or use heavy  "equipment.  If you have weakness or tingling, don't drive or use heavy equipment until this feeling goes away.   Do not drink alcohol.   Avoid strenuous or risky activities.  Ask for help when climbing stairs.  You may feel lightheaded.  IF so, sit for a few minutes before standing.  Have someone help you get up.   If you have nausea (feel sick to your stomach): Drink only clear liquids such as apple juice, ginger ale, broth or 7-Up.  Rest may also help.  Be sure to drink enough fluids.  Move to a regular diet as you feel able.   You may have a slight fever.  Call the doctor if your fever is over 100 F (37.7 C) (taken under the tongue) or lasts longer than 24 hours.  You may have a dry mouth, a sore throat, muscle aches or trouble sleeping. These should go away after 24 hours.  Do not make important or legal decisions.   It is recommended to avoid smoking.        Today you received a Marcaine or bupivacaine block to numb the nerves near your surgery site.  This is a block using local anesthetic or \"numbing\" medication injected around the nerves to anesthetize or \"numb\" the area supplied by those nerves.  This block is injected into the muscle layer near your surgical site.  The medication may numb the location where you had surgery for 6-18 hours, but may last up to 24 hours.  If your surgical site is an arm or leg you should be careful with your affected limb, since it is possible to injure your limb without being aware of it due to the numbing.  Until full feeling returns, you should guard against bumping or hitting your limb, and avoid extreme hot or cold temperatures on the skin.  As the block wears off, the feeling will return as a tingling or prickly sensation near your surgical site.  You will experience more discomfort from your incision as the feeling returns.  You may want to take a pain pill (a narcotic or Tylenol if this was prescribed by your surgeon) when you start to experience mild pain before the " pain beccomes more severe.  If your pain medications do not control your pain you should notifiy your surgeon.    Tips for taking pain medications  To get the best pain relief possible, remember these points:  Take pain medications as directed, before pain becomes severe.  Pain medication can upset your stomach: taking it with food may help.  Constipation is a common side effect of pain medication. Drink plenty of  fluids.  Eat foods high in fiber. Take a stool softener if recommended by your doctor or pharmacist.  Do not drink alcohol, drive or operate machinery while taking pain medications.  Ask about other ways to control pain, such as with heat, ice or relaxation.    Tylenol/Acetaminophen Consumption    If you feel your pain relief is insufficient, you may take Tylenol/Acetaminophen in addition to your narcotic pain medication.   Be careful not to exceed 4,000 mg of Tylenol/Acetaminophen in a 24 hour period from all sources.  If you are taking extra strength Tylenol/acetaminophen (500 mg), the maximum dose is 8 tablets in 24 hours.  If you are taking regular strength acetaminophen (325 mg), the maximum dose is 12 tablets in 24 hours.    Call a doctor for any of the following:  Signs of infection (fever, growing tenderness at the surgery site, a large amount of drainage or bleeding, severe pain, foul-smelling drainage, redness, swelling).  It has been over 8 to 10 hours since surgery and you are still not able to urinate (pass water).  Headache for over 24 hours.  Numbness, tingling or weakness the day after surgery (if you had spinal anesthesia).  Signs of Covid-19 infection (temperature over 100 degrees, shortness of breath, cough, loss of taste/smell, generalized body aches, persistent headache, chills, sore throat, nausea/vomiting/diarrhea)  Your doctor is:  Dr. Catherine Pollock, Plastic Surgery: [phone]                    Or dial [phone] and ask for the resident on call for:  Plastics  For emergency  care, call the:  [place] [facility]:  [phone] (TTY for hearing impaired: [phone])

## 2024-04-04 NOTE — ANESTHESIA PROCEDURE NOTES
Airway       Patient location during procedure: OR  Staff -        Performed By: CRNA  Consent for Airway        Urgency: elective  Indications and Patient Condition       Indications for airway management: dayanna-procedural       Induction type:intravenous       Mask difficulty assessment: 0 - not attempted    Final Airway Details       Final airway type: supraglottic airway    Supraglottic Airway Details        Type: LMA       LMA size: 4    Post intubation assessment        Placement verified by: capnometry, equal breath sounds and chest rise        Number of attempts at approach: 1       Secured with: tape       Ease of procedure: easy       Dentition: Intact and Unchanged

## 2024-04-04 NOTE — OP NOTE
PREOPERATIVE DIAGNOSIS: Symptomatic bilateral breast hypertrophy.     POSTOPERATIVE DIAGNOSIS: Symptomatic bilateral breast hypertrophy.     PROCEDURES: Bilateral superomedial pedicle inverted T skin closure breast reduction.     SURGEON: Catherine Pollock MD.     RESIDENT: Catherine Alcantar MD.    ANESTHESIA: General anesthesia with LMA     COMPLICATIONS: Nil.     DRAINS: Nil.     Blood Loss: 200 mL    SPECIMENS: Skin and breast tissue from right breast measuring about 364 g, left breast measuring about 464 g.     DESCRIPTION OF PROCEDURE: After informed consent was taken, the proper site and procedure was ascertained with the patient and was appropriately marked and taken to in the operating room.  She was placed in supine position with the knees comfortably flexed with pillows underneath them, and pneumoboots placed and running prior to induction of anesthesia. Preoperative antibiotics given in the OR. All pressure points were appropriately padded. General anesthesia was administered without any complications. She was placed in such a position that she could be flexed to about 50 degrees. Her arms were padded and abducted to about 50 degrees. She was prepped and draped in a standard surgical fashion. I began by first remarking the preop markings and marking a 42 mm areola on each side. I then marked out a superior medial pedicle on each side. I then de-epithelialized the pedicle on each side. I then dissected out the pedicle on each side without actually seeing the deep fascia and also released the pedicle such that it could rotate into its new nipple position without any tension. I then went ahead and on each side excised the inferomedial, inferior, inferolateral and lateral aspects of the breast according to a vertical pattern reduction. Strict hemostasis was ensured during this entire part of the case. Once this was done, I then temporarily closed the patient's breast in an inverted T fashion, sat the patient up  ensured symmetry and then marked out the new areolar opening symmetrically on each side. I then went ahead and closed the horizontal incision using 2-0 Monocryl suture in a deep dermal layer. Then I made sure that the nipple areolar complex could be retrieved without any tension, which is could on each side. I then checked that they were both pink and viable, which they were. I then went ahead and excised the skin of the new areolar opening and de-epithelialized epithelialized the portion that was involved in the pedicle on each side. I then sutured in the nipple areolar complex using 2-0 Monocryl suture in a deep dermal fashion circumferentially. I then closed the vertical incision using 2-0 Monocryl suture to approximate the medial and lateral pillars, and then the deep dermis. I then ran all the incisions with 3-0 Stratafix suture in a running intracuticular manner followed by placement of Prineo, and then followed by an ACE wrap. At the end of the case, the patient's breasts were soft, nipples were pink, and breasts were symmetric. The patient tolerated the procedure well. All counts correct at the end of the case. The patient was extubated and sent to recovery room in a stable condition.

## 2024-04-06 ENCOUNTER — MYC REFILL (OUTPATIENT)
Dept: ENDOCRINOLOGY | Facility: CLINIC | Age: 29
End: 2024-04-06
Payer: COMMERCIAL

## 2024-04-06 ENCOUNTER — MYC REFILL (OUTPATIENT)
Dept: PSYCHIATRY | Facility: CLINIC | Age: 29
End: 2024-04-06
Payer: COMMERCIAL

## 2024-04-06 DIAGNOSIS — F31.81 BIPOLAR 2 DISORDER (H): ICD-10-CM

## 2024-04-06 DIAGNOSIS — E66.811 CLASS 1 OBESITY WITHOUT SERIOUS COMORBIDITY WITH BODY MASS INDEX (BMI) OF 33.0 TO 33.9 IN ADULT, UNSPECIFIED OBESITY TYPE: ICD-10-CM

## 2024-04-08 RX ORDER — LAMOTRIGINE 100 MG/1
TABLET ORAL
Qty: 74 TABLET | Refills: 0 | OUTPATIENT
Start: 2024-04-08 | End: 2024-05-21

## 2024-04-08 RX ORDER — LAMOTRIGINE 200 MG/1
200 TABLET ORAL DAILY
Qty: 30 TABLET | Refills: 1 | Status: SHIPPED | OUTPATIENT
Start: 2024-04-08 | End: 2024-06-03

## 2024-04-08 NOTE — TELEPHONE ENCOUNTER
Last seen: 03/06/2024  RTC: 4 weeks  Cancel: 0  No-show: 0  Next appt: 0     Incoming refill from Patient via mychart    Medication requested:   Pending Prescriptions:                       Disp   Refills    lamoTRIgine (LAMICTAL) 100 MG tablet      74 tab*0            Sig: Take 1 tablet (100 mg) by mouth daily for 14           days, THEN 2 tablets (200 mg) daily for 30 days.      From chart note:   - Increase lamotrigine to 200 mg      Medication unable to be refilled by RN due to criteria not met as indicated.                 []Eligibility - not seen in the last year              [x]Supervision - no future appointment              []Compliance - no shows, cancellations or lapse in therapy              []Verification - order discrepancy              []Controlled medication              []Medication not included in policy              []90-day supply request              []Other:

## 2024-04-09 ENCOUNTER — TELEPHONE (OUTPATIENT)
Dept: ENDOCRINOLOGY | Facility: CLINIC | Age: 29
End: 2024-04-09
Payer: COMMERCIAL

## 2024-04-09 NOTE — TELEPHONE ENCOUNTER
General Call    Contacts         Type Contact Phone/Fax    04/09/2024 10:16 AM CDT Phone (Incoming) Gerson Contreras FENG (Self) [phone] (M)          Reason for Call:  medications    What are your questions or concerns:  pt is checking on Phentermine/topiramate refill status, pt just had surg and needs to  today when she has transportation to pharmacy      Could we send this information to you in [facility] or would you prefer to receive a phone call?:   Patient would prefer a phone call   Okay to leave a detailed message?: Yes at Cell number on file:    Telephone Information:   Mobile [phone]

## 2024-04-10 RX ORDER — TOPIRAMATE 25 MG/1
TABLET, FILM COATED ORAL
Qty: 90 TABLET | Refills: 2 | Status: SHIPPED | OUTPATIENT
Start: 2024-04-10 | End: 2024-05-22

## 2024-04-10 RX ORDER — PHENTERMINE HYDROCHLORIDE 15 MG/1
15 CAPSULE ORAL EVERY MORNING
Qty: 30 CAPSULE | Refills: 3 | Status: SHIPPED | OUTPATIENT
Start: 2024-04-10 | End: 2024-05-22

## 2024-04-11 ENCOUNTER — VIRTUAL VISIT (OUTPATIENT)
Dept: PSYCHOLOGY | Facility: CLINIC | Age: 29
End: 2024-04-11
Payer: COMMERCIAL

## 2024-04-11 DIAGNOSIS — F31.81 BIPOLAR 2 DISORDER (H): Primary | ICD-10-CM

## 2024-04-11 PROCEDURE — 90837 PSYTX W PT 60 MINUTES: CPT | Mod: 95

## 2024-04-11 ASSESSMENT — PATIENT HEALTH QUESTIONNAIRE - PHQ9
SUM OF ALL RESPONSES TO PHQ QUESTIONS 1-9: 7
10. IF YOU CHECKED OFF ANY PROBLEMS, HOW DIFFICULT HAVE THESE PROBLEMS MADE IT FOR YOU TO DO YOUR WORK, TAKE CARE OF THINGS AT HOME, OR GET ALONG WITH OTHER PEOPLE: NOT DIFFICULT AT ALL
SUM OF ALL RESPONSES TO PHQ QUESTIONS 1-9: 7

## 2024-04-11 NOTE — PROGRESS NOTES
[facility]                                     Progress Note    Patient Name: Cindy Contreras  Date: 4/11/24     Service Type: Individual      Session Start Time: 3:00 pm  Session End Time: 3:53 pm     Session Length: 53 min    Session #: 8    Attendees: Client attended alone    Service Modality:  Video Visit:      Provider verified identity through the following two step process.  Patient provided:  Patient is known previously to provider    Telemedicine Visit: The patient's condition can be safely assessed and treated via synchronous audio and visual telemedicine encounter.      Reason for Telemedicine Visit: Patient convenience (e.g. access to timely appointments / distance to available provider)    Originating Site (Patient Location): Patient's home    Distant Site (Provider Location): Provider Remote Setting- Home Office    Consent:  The patient/guardian has verbally consented to: the potential risks and benefits of telemedicine (video visit) versus in person care; bill my insurance or make self-payment for services provided; and responsibility for payment of non-covered services.     Patient would like the video invitation sent by:  My Chart    Mode of Communication:  Video Conference via Amwell    Distant Location (Provider):  Off-site    As the provider I attest to compliance with applicable laws and regulations related to telemedicine.    DATA  Interactive Complexity: No  Crisis: No      Progress Since Last Session (Related to Symptoms / Goals / Homework):   Symptoms: Improving drained following mood instability    Homework: Partially completed      Episode of Care Goals: Minimal progress - CONTEMPLATION (Considering change and yet undecided); Intervened by assessing the negative and positive thinking (ambivalence) about behavior change     Current / Ongoing Stressors and Concerns:  Rumination about past relationship(s) Recognition of pattern of lacking desire/attraction to male  partners-association of worth related to attractive to men. Working on shifting name to [place] gender identity they/she at work. Return to work next week, concerns regarding follow through on  reports made prior to leave. Work overwhelm. Stopped vaping nicotine.      Treatment Objective(s) Addressed in This Session:   Reinforce  safety plan  Patient will identify and practice at least 5 strategies for more effectively managing Bipolar Disorder.  Patient will learn and practice grounding, DBT skills including distress tolerance, emotion regulation and mindfulness  Patient will identify and practice structure in sleep, ADLs, exercise and other wellness activities   Intervention:   Interpersonal Therapy: provided active listening and validation, utilized rapport building  Provided psychoeducation on grounding skills, meditation loy and loving kindness. Reinforced ADLs, medication in the am. Surfaced family patterns and attachment struggles impact on current functioning. Completed interactive awareness reflection.    Assessments completed prior to visit:  The following assessments were completed by patient for this visit:  PHQ2:       3/6/2024     1:00 PM 7/5/2022     3:27 PM   PHQ-2 ( 1999 [employer])   Q1: Little interest or pleasure in doing things 2 3   Q2: Feeling down, depressed or hopeless 2 3   PHQ-2 Score 4 6   Q1: Little interest or pleasure in doing things  Nearly every day   Q2: Feeling down, depressed or hopeless  Nearly every day   PHQ-2 Score  6     PHQ9:       10/31/2023     7:47 AM 12/20/2023    12:47 PM 1/2/2024     8:01 AM 1/15/2024     9:23 PM 1/24/2024     9:40 AM 3/6/2024     1:00 PM 4/11/2024     2:59 PM   PHQ-9 SCORE   PHQ-9 Total Score MyChart 16 (Moderately severe depression) 6 (Mild depression) 10 (Moderate depression) 16 (Moderately severe depression) 13 (Moderate depression)  7 (Mild depression)   PHQ-9 Total Score 16 6 10 16 13 19 7     GAD2:       10/24/2023    10:53 AM 1/24/2024     9:41 AM  2/13/2024     8:00 AM   BLANCA-2   Feeling nervous, anxious, or on edge 2 2 3   Not being able to stop or control worrying 2 2 3   BLANCA-2 Total Score 4    4 4    4 6     GAD7:       7/25/2023     2:51 PM 9/14/2023     9:10 AM 10/24/2023    10:53 AM 1/24/2024     9:41 AM 2/13/2024     8:00 AM   BLANCA-7 SCORE   Total Score 8 (mild anxiety) 8 (mild anxiety) 14 (moderate anxiety) 14 (moderate anxiety) 20 (severe anxiety)   Total Score 8 8 14    14 14    14 20     CAGE-AID:       10/24/2023    10:54 AM   CAGE-AID Total Score   Total Score 0   Total Score MyChart 0 (A total score of 2 or greater is considered clinically significant)     PROMIS 10-Global Health (all questions and answers displayed):       10/24/2023    10:54 AM 1/24/2024     9:42 AM 2/13/2024     8:01 AM   PROMIS 10   In general, would you say your health is: Good Very good Very good   In general, would you say your quality of life is: Good Very good Good   In general, how would you rate your physical health? Good Very good Very good   In general, how would you rate your mental health, including your mood and your ability to think? Fair Fair Fair   In general, how would you rate your satisfaction with your social activities and relationships? Good Fair Very good   In general, please rate how well you carry out your usual social activities and roles Good Very good Good   To what extent are you able to carry out your everyday physical activities such as walking, climbing stairs, carrying groceries, or moving a chair? Completely Completely Completely   In the past 7 days, how often have you been bothered by emotional problems such as feeling anxious, depressed, or irritable? Often Always Always   In the past 7 days, how would you rate your fatigue on average? Moderate Moderate Moderate   In the past 7 days, how would you rate your pain on average, where 0 means no pain, and 10 means worst imaginable pain? 3 5 5   In general, would you say your health is: 3  4   In  general, would you say your quality of life is: 3  3   In general, how would you rate your physical health? 3  4   In general, how would you rate your mental health, including your mood and your ability to think? 2  2   In general, how would you rate your satisfaction with your social activities and relationships? 3  4   In general, please rate how well you carry out your usual social activities and roles. (This includes activities at home, at work and in your community, and responsibilities as a parent, child, spouse, employee, friend, etc.) 3  3   To what extent are you able to carry out your everyday physical activities such as walking, climbing stairs, carrying groceries, or moving a chair? 5  5   In the past 7 days, how often have you been bothered by emotional problems such as feeling anxious, depressed, or irritable? 4  5   In the past 7 days, how would you rate your fatigue on average? 3  3   In the past 7 days, how would you rate your pain on average, where 0 means no pain, and 10 means worst imaginable pain? 3  5   Global Mental Health Score 10    10 9     10   Global Physical Health Score 15    15 15     15   PROMIS TOTAL - SUBSCORES 25    25 24     25       Information is confidential and restricted. Go to Review Flowsheets to unlock data.    Multiple values from one day are sorted in reverse-chronological order     [place] Suicide Severity Rating Scale (Lifetime/Recent)      10/24/2023    11:00 AM 4/4/2024     6:32 AM   [place] Suicide Severity Rating (Lifetime/Recent)   Q1 Wished to be Dead (Past Month) no 0-->no   Q2 Suicidal Thoughts (Past Month) no 0-->no   Q6 Suicide Behavior (Lifetime) no 0-->no   Level of Risk per Screen  no risks indicated         ASSESSMENT: Current Emotional / Mental Status (status of significant symptoms):   Risk status (Self / Other harm or suicidal ideation)   Patient denies current fears or concerns for personal safety.   Patient reports the following current or recent  suicidal ideation or behaviors: passive SI [id number]-[id number]-whfgyctex by distraction.   Patient denies current or recent homicidal ideation or behaviors.   Patient reports current or recent self injurious behavior or ideation including passive Ideation.   Patient denies other safety concerns.   Patient reports there has been no change in risk factors since their last session.     Patient reports there has been no change in protective factors since their last session.     A safety and risk management plan has been developed including: Patient consented to co-developed safety plan on 10/31/23.  Safety and risk management plan was reviewed.   Patient agreed to use safety plan should any safety concerns arise.  A copy was made available to the patient.     Appearance:   Appropriate    Eye Contact:   Good    Psychomotor Behavior: Normal    Attitude:   Cooperative    Orientation:   All   Speech    Rate / Production: Normal     Volume:  Normal    Mood:    Depressed    Affect:    Blunted    Thought Content:  Clear  Rumination    Thought Form:  Circumstantial   Insight:    Fair      Medication Review:   No changes to current psychiatric medication(s)     Medication Compliance:   Yes     Changes in Health Issues:   None reported     Chemical Use Review:   Substance Use: decrease in alcohol .  Patient reports frequency of use stopped drinking before surgery, limited drinking now, lacks desire to drink.  Provided encouragement towards sobriety        Tobacco Use: Yes, successfully quit to prepare for surgery April 4.  Patient reports frequency of use stopped. Does not need support.     Diagnosis:  1. Bipolar 2 disorder (H)      Collateral Reports Completed:   Not Applicable    PLAN: (Patient Tasks / Therapist Tasks / Other)  Use safety plan, use resources to move forward (accountability, emotional intelligence, intuition, loving kindness.) Complete values exercise, return to am meds, ADLs     DANE OjedaSW   4/11/2024    _________________________________________________________________                                            Individual Treatment Plan    Patient's Name: Cindy Contreras  YOB: 1995    Date of Creation: 1/2/24  Date Treatment Plan Last Reviewed/Revised:     DSM5 Diagnoses: 296.89 Bipolar II Disorder With seasonal pattern  Psychosocial / Contextual Factors:   PROMIS (reviewed every 90 days): 24 (10/25/23)    Referral / Collaboration:  Referral to another professional/service is not indicated at this time..    Anticipated number of session for this episode of care: 6-9 sessions  Anticipation frequency of session: Every other week  Anticipated Duration of each session: 53 or more minutes  Treatment plan will be reviewed in 90 days or when goals have been changed.     MeasurableTreatment Goal(s) related to diagnosis / functional impairment(s)  Goal 1: Patient will learn to reduce rumination, practice thought stopping to improve emotion regulation to improve focus and healthy behaviors relationships    I will know I've met my goal when tbc.      Objective #A (Patient Action)    Patient will  Use created safety plan when sx present, share with others .  Status: New - Date: 1/2/24      Intervention(s)  Therapist will teach  safety plan skills .    Objective #B  Patient will  identify values and put them into practice for daily living/decision-making .  Status:  New - Date: 1/2/24  Intervention(s)  Therapist will assign homework of values practice and process/integrate  identified values into therapy goals .    Objective #C    Patient will identify and practice at least 5 strategies for more effectively managing Bipolar Disorder.  Patient will learn and practice grounding, DBT skills including distress tolerance, emotion regulation and mindfulness  Patient will identify and practice structure in sleep, ADLs, exercise and other wellness activities  Status: New - Date: 1/2/24   "    Intervention(s)  Therapist will teach skills and assign homework .    Patient has reviewed and agreed to the above plan.      Marycruz Degroot, [facility]  January 2, 2024                [facility]                                       Cindy Contreras     SAFETY PLAN:  Step 1: Warning signs / cues (Thoughts, images, mood, situation, behavior) that a crisis may be developing:  Thoughts:  I'm a failure, I'm alone, Nobody cares about me  Images: flashbacks and visions of harm: past memories of cutting  Thinking Processes: ruminations (can't stop thinking about my problems): relationships  Mood: worsening depression, hopelessness, and mood swings  Behaviors: isolating/withdrawing , can't stop crying, not taking care of myself, not taking care of my responsibilities, sleeping too much, not sleeping enough, and increasing frequency and duration of dissociation  Situations: relationship problems, financial stress, and tough situations (ie car accident)    Step 2: Coping strategies - Things I can do to take my mind off of my problems without contacting another person (relaxation technique, physical activity):  Distress Tolerance Strategies:  play with my pet , listen to positive and upbeat music: play guitar, sensory based activities/self-soothe with five senses: essential oils, watch a funny movie: podcast-H3, read a book:  , change body temperature (ice pack/cold water) , paced breathing/progressive muscle relaxation, intense exercise: gym across the street  for 2-3 minutes , and play guitar  Physical Activities: go for a walk  Focus on helpful thoughts:  \"I will get through this\", \"Ride the wave\", remind myself of what is important to me: dog, family, friends, and my people are here for me  Step 3: People and social settings that provide distraction:   Mom, best friend Agatha, sister Angela   coffee shop, park, library, gym , and work   Step 4: Remind myself of people and things that are important to me and " worth living for:  family, friends, dog  Step 5: When I am in crisis, I can ask these people to help me use my safety plan:  Agatha, mom, stepmom,   Step 6: Making the environment safe:   be around others  Step 7: Professionals or agencies I can contact during a crisis:  Suicide Prevention Lifeline: Call or Text 988   Local Crisis Services:  [facility], Warm Line    Call 911 or go to my nearest emergency department.   I helped develop this safety plan and agree to use it when needed.  I have been given a copy of this plan.      Client signature _________________________________________________________________  Today s date:  10/31/2023  Completed by Provider Name/ Credentials:  Marycruz Degroot MSW LICSW  October 31, 2023  Adapted from Safety Plan Template 2008 Nelsy Cervantes and Igor Glasgow is reprinted with the express permission of the authors.  No portion of the Safety Plan Template may be reproduced without the express, written permission.  You can contact the authors at [email or web address] or [email or web address].

## 2024-04-14 LAB
PATH REPORT.COMMENTS IMP SPEC: NORMAL
PATH REPORT.COMMENTS IMP SPEC: NORMAL
PATH REPORT.FINAL DX SPEC: NORMAL
PATH REPORT.GROSS SPEC: NORMAL
PATH REPORT.MICROSCOPIC SPEC OTHER STN: NORMAL
PATH REPORT.RELEVANT HX SPEC: NORMAL
PHOTO IMAGE: NORMAL

## 2024-04-16 ENCOUNTER — OFFICE VISIT (OUTPATIENT)
Dept: PLASTIC SURGERY | Facility: CLINIC | Age: 29
End: 2024-04-16
Payer: COMMERCIAL

## 2024-04-16 VITALS
BODY MASS INDEX: 23.68 KG/M2 | TEMPERATURE: 98.2 F | HEART RATE: 87 BPM | OXYGEN SATURATION: 100 % | HEIGHT: 61 IN | SYSTOLIC BLOOD PRESSURE: 114 MMHG | WEIGHT: 125.4 LBS | DIASTOLIC BLOOD PRESSURE: 80 MMHG

## 2024-04-16 DIAGNOSIS — N62 LARGE BREASTS: Primary | ICD-10-CM

## 2024-04-16 PROCEDURE — 99024 POSTOP FOLLOW-UP VISIT: CPT | Performed by: PHYSICIAN ASSISTANT

## 2024-04-16 ASSESSMENT — PAIN SCALES - GENERAL: PAINLEVEL: MILD PAIN (2)

## 2024-04-16 NOTE — PROGRESS NOTES
"Plastic Surgery Outpatient Visit    ID: Cindy Contreras is a 28 year old other s/p bilateral breast reduction 4/4/2024 with Dr. Pollock     S: Doing well. No significant pain, taking ibuprofen PRN. Has concerns that nipples are at different heights.     O:  /80 (BP Location: Left arm, Patient Position: Sitting, Cuff Size: Adult Regular)   Pulse 87   Temp 98.2  F (36.8  C) (Oral)   Ht 1.549 m (5' 1\")   Wt 56.9 kg (125 lb 6.4 oz)   SpO2 100%   BMI 23.69 kg/m     General: NAD  Chest: bilateral breast incisions c/d/I. Nipples intact, viable bilaterally. R nipple slightly lower than left. No significant swelling or ecchymosis.     PATH: benign breast tissue    A/P:  -healing well  -tape off in 1 week  -moisturize with vaseline or aquafor for a few weeks, then ok to start scar care if desired with silicone strips or bio oil  -continue soft bra and lifting restrictions, <10lbs, until 6 weeks post op. Then ok to increase activity slowly  -Allow everything to settle and evaluate any asymmetries with the nipples. Discussed that some asymmetries are to be anticipated and I encouraged her to consider how/if this was ultimately bothersome to her.   -RTC 6 weeks    Liberty Brooks PA-C  Plastic and Reconstructive Surgery    15 minutes spent on the date of the encounter doing chart review, history and physical, dressing changes, documentation and further activity as noted above.   "

## 2024-04-16 NOTE — LETTER
"4/16/2024       RE: Cindy Contreras  [address]  [facility] [place] [id number]     Dear Colleague,    Thank you for referring your patient, Cindy Contreras, to the [facility] [place] at [facility]. Please see a copy of my visit note below.    Plastic Surgery Outpatient Visit    ID: Cindy Contreras is a 28 year old other s/p bilateral breast reduction 4/4/2024 with Dr. Pollock     S: Doing well. No significant pain, taking ibuprofen PRN. Has concerns that nipples are at different heights.     O:  /80 (BP Location: Left arm, Patient Position: Sitting, Cuff Size: Adult Regular)   Pulse 87   Temp 98.2  F (36.8  C) (Oral)   Ht 1.549 m (5' 1\")   Wt 56.9 kg (125 lb 6.4 oz)   SpO2 100%   BMI 23.69 kg/m     General: NAD  Chest: bilateral breast incisions c/d/I. Nipples intact, viable bilaterally. R nipple slightly lower than left. No significant swelling or ecchymosis.     PATH: benign breast tissue    A/P:  -healing well  -tape off in 1 week  -moisturize with vaseline or aquafor for a few weeks, then ok to start scar care if desired with silicone strips or bio oil  -continue soft bra and lifting restrictions, <10lbs, until 6 weeks post op. Then ok to increase activity slowly  -Allow everything to settle and evaluate any asymmetries with the nipples. Discussed that some asymmetries are to be anticipated and I encouraged her to consider how/if this was ultimately bothersome to her.   -RTC 6 weeks    15 minutes spent on the date of the encounter doing chart review, history and physical, dressing changes, documentation and further activity as noted above.       Again, thank you for allowing me to participate in the care of your patient.      Sincerely,    Liberty Brooks PA-C    "

## 2024-04-16 NOTE — NURSING NOTE
"Chief Complaint   Patient presents with    Surgical Followup     Post-op, DOS 4/4/24.       Vitals:    04/16/24 0839   BP: 114/80   BP Location: Left arm   Patient Position: Sitting   Cuff Size: Adult Regular   Pulse: 87   Temp: 98.2  F (36.8  C)   TempSrc: Oral   SpO2: 100%   Weight: 125 lb 6.4 oz   Height: 5' 1\"       Body mass index is 23.69 kg/m .    Patient reports mild breast pain (2/10).    Davie Lee, EMT    "

## 2024-04-21 DIAGNOSIS — F31.81 BIPOLAR 2 DISORDER (H): ICD-10-CM

## 2024-04-22 RX ORDER — ESCITALOPRAM OXALATE 5 MG/1
5 TABLET ORAL DAILY
Qty: 90 TABLET | Refills: 0 | OUTPATIENT
Start: 2024-04-22

## 2024-04-23 ENCOUNTER — VIRTUAL VISIT (OUTPATIENT)
Dept: PSYCHOLOGY | Facility: CLINIC | Age: 29
End: 2024-04-23
Payer: COMMERCIAL

## 2024-04-23 DIAGNOSIS — F31.81 BIPOLAR 2 DISORDER (H): Primary | ICD-10-CM

## 2024-04-23 PROCEDURE — 90837 PSYTX W PT 60 MINUTES: CPT | Mod: 95

## 2024-04-23 NOTE — PROGRESS NOTES
[facility]                                     Progress Note    Patient Name: Cindy Contreras  Date: 4/23/24     Service Type: Individual      Session Start Time: 4:05 pm  Session End Time: 4:58 pm     Session Length: 53 min    Session #: 9    Attendees: Client attended alone    Service Modality:  Video Visit:      Provider verified identity through the following two step process.  Patient provided:  Patient is known previously to provider    Telemedicine Visit: The patient's condition can be safely assessed and treated via synchronous audio and visual telemedicine encounter.      Reason for Telemedicine Visit: Patient convenience (e.g. access to timely appointments / distance to available provider)    Originating Site (Patient Location): Patient's home    Distant Site (Provider Location): Provider Remote Setting- Home Office    Consent:  The patient/guardian has verbally consented to: the potential risks and benefits of telemedicine (video visit) versus in person care; bill my insurance or make self-payment for services provided; and responsibility for payment of non-covered services.     Patient would like the video invitation sent by:  My Chart    Mode of Communication:  Video Conference via Amwell    Distant Location (Provider):  Off-site    As the provider I attest to compliance with applicable laws and regulations related to telemedicine.    DATA  Interactive Complexity: No  Crisis: No      Progress Since Last Session (Related to Symptoms / Goals / Homework):   Symptoms: Improving drained following mood instability    Homework: Partially completed      Episode of Care Goals: Satisfactory progress - PREPARATION (Decided to change - considering how); Intervened by negotiating a change plan and determining options / strategies for behavior change, identifying triggers, exploring social supports, and working towards setting a date to begin behavior change     Current / Ongoing Stressors and  Concerns:  Recognizing symptoms of borderline personality disorder. Rumination about past relationship(s) and their patterns Recognition of association of worth related to being attractive to men. Working on shifting name to G[place] gender identity they/she at work. Return to work next week, concerns regarding follow through on  reports made prior to leave. Work overwhelm. Stopped vaping nicotine.      Treatment Objective(s) Addressed in This Session:   Reinforce  safety plan  Patient will identify and practice at least 5 strategies for more effectively managing Bipolar Disorder.  Patient will learn and practice grounding, DBT skills including distress tolerance, emotion regulation and mindfulness  Patient will identify and practice structure in sleep, ADLs, exercise and other wellness activities   Intervention:   Interpersonal Therapy: provided active listening and validation, utilized rapport building  Provided psychoeducation on grounding skills, meditation loy and loving kindness. Reinforced ADLs, medication in the am. Reviewed BPD symptoms and surfaced examples in pt life.  Introduced DBT wise mind   Assessments completed prior to visit:  The following assessments were completed by patient for this visit:  PHQ2:       3/6/2024     1:00 PM 7/5/2022     3:27 PM   PHQ-2 ( 1999 [employer])   Q1: Little interest or pleasure in doing things 2 3   Q2: Feeling down, depressed or hopeless 2 3   PHQ-2 Score 4 6   Q1: Little interest or pleasure in doing things  Nearly every day   Q2: Feeling down, depressed or hopeless  Nearly every day   PHQ-2 Score  6     PHQ9:       10/31/2023     7:47 AM 12/20/2023    12:47 PM 1/2/2024     8:01 AM 1/15/2024     9:23 PM 1/24/2024     9:40 AM 3/6/2024     1:00 PM 4/11/2024     2:59 PM   PHQ-9 SCORE   PHQ-9 Total Score MyChart 16 (Moderately severe depression) 6 (Mild depression) 10 (Moderate depression) 16 (Moderately severe depression) 13 (Moderate depression)  7 (Mild depression)   PHQ-9  Total Score 16 6 10 16 13 19 7     GAD2:       10/24/2023    10:53 AM 1/24/2024     9:41 AM 2/13/2024     8:00 AM   BLANCA-2   Feeling nervous, anxious, or on edge 2 2 3   Not being able to stop or control worrying 2 2 3   BLANCA-2 Total Score 4    4 4    4 6     GAD7:       7/25/2023     2:51 PM 9/14/2023     9:10 AM 10/24/2023    10:53 AM 1/24/2024     9:41 AM 2/13/2024     8:00 AM   BLANCA-7 SCORE   Total Score 8 (mild anxiety) 8 (mild anxiety) 14 (moderate anxiety) 14 (moderate anxiety) 20 (severe anxiety)   Total Score 8 8 14    14 14    14 20     CAGE-AID:       10/24/2023    10:54 AM   CAGE-AID Total Score   Total Score 0   Total Score MyChart 0 (A total score of 2 or greater is considered clinically significant)     PROMIS 10-Global Health (all questions and answers displayed):       10/24/2023    10:54 AM 1/24/2024     9:42 AM 2/13/2024     8:01 AM   PROMIS 10   In general, would you say your health is: Good Very good Very good   In general, would you say your quality of life is: Good Very good Good   In general, how would you rate your physical health? Good Very good Very good   In general, how would you rate your mental health, including your mood and your ability to think? Fair Fair Fair   In general, how would you rate your satisfaction with your social activities and relationships? Good Fair Very good   In general, please rate how well you carry out your usual social activities and roles Good Very good Good   To what extent are you able to carry out your everyday physical activities such as walking, climbing stairs, carrying groceries, or moving a chair? Completely Completely Completely   In the past 7 days, how often have you been bothered by emotional problems such as feeling anxious, depressed, or irritable? Often Always Always   In the past 7 days, how would you rate your fatigue on average? Moderate Moderate Moderate   In the past 7 days, how would you rate your pain on average, where 0 means no pain, and  10 means worst imaginable pain? 3 5 5   In general, would you say your health is: 3  4   In general, would you say your quality of life is: 3  3   In general, how would you rate your physical health? 3  4   In general, how would you rate your mental health, including your mood and your ability to think? 2  2   In general, how would you rate your satisfaction with your social activities and relationships? 3  4   In general, please rate how well you carry out your usual social activities and roles. (This includes activities at home, at work and in your community, and responsibilities as a parent, child, spouse, employee, friend, etc.) 3  3   To what extent are you able to carry out your everyday physical activities such as walking, climbing stairs, carrying groceries, or moving a chair? 5  5   In the past 7 days, how often have you been bothered by emotional problems such as feeling anxious, depressed, or irritable? 4  5   In the past 7 days, how would you rate your fatigue on average? 3  3   In the past 7 days, how would you rate your pain on average, where 0 means no pain, and 10 means worst imaginable pain? 3  5   Global Mental Health Score 10    10 9     10   Global Physical Health Score 15    15 15     15   PROMIS TOTAL - SUBSCORES 25    25 24     25       Information is confidential and restricted. Go to Review Flowsheets to unlock data.    Multiple values from one day are sorted in reverse-chronological order     [place] Suicide Severity Rating Scale (Lifetime/Recent)      10/24/2023    11:00 AM 4/4/2024     6:32 AM   [place] Suicide Severity Rating (Lifetime/Recent)   Q1 Wished to be Dead (Past Month) no 0-->no   Q2 Suicidal Thoughts (Past Month) no 0-->no   Q6 Suicide Behavior (Lifetime) no 0-->no   Level of Risk per Screen  no risks indicated         ASSESSMENT: Current Emotional / Mental Status (status of significant symptoms):   Risk status (Self / Other harm or suicidal ideation)   Patient denies  current fears or concerns for personal safety.   Patient reports the following current or recent suicidal ideation or behaviors: passive SI [id number] by distraction.   Patient denies current or recent homicidal ideation or behaviors.   Patient reports current or recent self injurious behavior or ideation including passive Ideation.   Patient denies other safety concerns.   Patient reports there has been no change in risk factors since their last session.     Patient reports there has been no change in protective factors since their last session.     A safety and risk management plan has been developed including: Patient consented to co-developed safety plan on 10/31/23.  Safety and risk management plan was reviewed.   Patient agreed to use safety plan should any safety concerns arise.  A copy was made available to the patient.     Appearance:   Appropriate    Eye Contact:   Good    Psychomotor Behavior: Normal    Attitude:   Cooperative    Orientation:   All   Speech    Rate / Production: Normal     Volume:  Normal    Mood:    Depressed    Affect:    Blunted    Thought Content:  Clear  Rumination    Thought Form:  Circumstantial   Insight:    Fair      Medication Review:   No changes to current psychiatric medication(s)     Medication Compliance:   Yes     Changes in Health Issues:   None reported     Chemical Use Review:   Substance Use: decrease in alcohol .  Patient reports frequency of use stopped drinking before surgery, limited drinking now, lacks desire to drink.  Provided encouragement towards sobriety        Tobacco Use: Yes, successfully quit to prepare for surgery April 4.  Patient reports frequency of use stopped. Does not need support.     Diagnosis:  1. Bipolar 2 disorder (H)      Collateral Reports Completed:   Not Applicable    PLAN: (Patient Tasks / Therapist Tasks / Other)  Use safety plan, use resources to move forward (accountability, emotional intelligence, intuition, loving  [email or web address].Jessie Degroot, [facility]  4/23/2024    _________________________________________________________________                                            Individual Treatment Plan    Patient's Name: Cindy Contreras  YOB: 1995    Date of Creation: 1/2/24  Date Treatment Plan Last Reviewed/Revised:     DSM5 Diagnoses: 296.89 Bipolar II Disorder With seasonal pattern  Psychosocial / Contextual Factors:   PROMIS (reviewed every 90 days): 24 (10/25/23)    Referral / Collaboration:  Referral to another professional/service is not indicated at this time..    Anticipated number of session for this episode of care: 6-9 sessions  Anticipation frequency of session: Every other week  Anticipated Duration of each session: 53 or more minutes  Treatment plan will be reviewed in 90 days or when goals have been changed.     MeasurableTreatment Goal(s) related to diagnosis / functional impairment(s)  Goal 1: Patient will learn to reduce rumination, practice thought stopping to improve emotion regulation to improve focus and healthy behaviors relationships    I will know I've met my goal when tbc.      Objective #A (Patient Action)    Patient will  Use created safety plan when sx present, share with others .  Status: New - Date: 1/2/24      Intervention(s)  Therapist will teach  safety plan skills .    Objective #B  Patient will  identify values and put them into practice for daily living/decision-making .  Status:  New - Date: 1/2/24  Intervention(s)  Therapist will assign homework of values practice and process/integrate  identified values into therapy goals .    Objective #C    Patient will identify and practice at least 5 strategies for more effectively managing Bipolar Disorder.  Patient will learn and practice grounding, DBT skills including distress tolerance, emotion regulation and mindfulness  Patient will identify and practice structure in sleep, ADLs, exercise and other wellness activities  Status: New -  "Date: 1/2/24      Intervention(s)  Therapist will teach skills and assign homework .    Patient has reviewed and agreed to the above plan.      Marycruz Degroot, [facility]  January 2, 2024                [facility]                                       Cindy Contreras     SAFETY PLAN:  Step 1: Warning signs / cues (Thoughts, images, mood, situation, behavior) that a crisis may be developing:  Thoughts:  I'm a failure, I'm alone, Nobody cares about me  Images: flashbacks and visions of harm: past memories of cutting  Thinking Processes: ruminations (can't stop thinking about my problems): relationships  Mood: worsening depression, hopelessness, and mood swings  Behaviors: isolating/withdrawing , can't stop crying, not taking care of myself, not taking care of my responsibilities, sleeping too much, not sleeping enough, and increasing frequency and duration of dissociation  Situations: relationship problems, financial stress, and tough situations (ie car accident)    Step 2: Coping strategies - Things I can do to take my mind off of my problems without contacting another person (relaxation technique, physical activity):  Distress Tolerance Strategies:  play with my pet , listen to positive and upbeat music: play guitar, sensory based activities/self-soothe with five senses: essential oils, watch a funny movie: podcast-H3, read a book:  , change body temperature (ice pack/cold water) , paced breathing/progressive muscle relaxation, intense exercise: gym across the street  for 2-3 minutes , and play guitar  Physical Activities: go for a walk  Focus on helpful thoughts:  \"I will get through this\", \"Ride the wave\", remind myself of what is important to me: dog, family, friends, and my people are here for me  Step 3: People and social settings that provide distraction:   Mom, best friend Agatha, sister Angela   coffee shop, park, library, gym , and work   Step 4: Remind myself of people and things that are " important to me and worth living for:  family, friends, dog  Step 5: When I am in crisis, I can ask these people to help me use my safety plan:  Agatha, mom, stepmom,   Step 6: Making the environment safe:   be around others  Step 7: Professionals or agencies I can contact during a crisis:  Suicide Prevention Lifeline: Call or Text 985   Local Crisis Services:  [facility], Warm Line    Call 911 or go to my nearest emergency department.   I helped develop this safety plan and agree to use it when needed.  I have been given a copy of this plan.      Client signature _________________________________________________________________  Today s date:  10/31/2023  Completed by Provider Name/ Credentials:  Marycruz Degroot [facility] LIC[facility]  October 31, 2023  Adapted from Safety Plan Template 2008 Nelsy Cervantes and Igor Glasgow is reprinted with the express permission of the authors.  No portion of the Safety Plan Template may be reproduced without the express, written permission.  You can contact the authors at [email or web address] or [email or web address].

## 2024-05-07 ENCOUNTER — VIRTUAL VISIT (OUTPATIENT)
Dept: PSYCHOLOGY | Facility: CLINIC | Age: 29
End: 2024-05-07
Payer: COMMERCIAL

## 2024-05-07 DIAGNOSIS — F31.81 BIPOLAR 2 DISORDER (H): Primary | ICD-10-CM

## 2024-05-07 PROCEDURE — 90837 PSYTX W PT 60 MINUTES: CPT | Mod: 95

## 2024-05-07 NOTE — PROGRESS NOTES
[facility]                                     Progress Note    Patient Name: Cindy Contreras  Date: 5/7/24     Service Type: Individual      Session Start Time: 4:00 pm  Session End Time: 4:53 pm     Session Length: 53 min    Session #: 10    Attendees: Client attended alone    Service Modality:  Video Visit:      Provider verified identity through the following two step process.  Patient provided:  Patient is known previously to provider    Telemedicine Visit: The patient's condition can be safely assessed and treated via synchronous audio and visual telemedicine encounter.      Reason for Telemedicine Visit: Patient convenience (e.g. access to timely appointments / distance to available provider)    Originating Site (Patient Location): Patient's home    Distant Site (Provider Location): Provider Remote Setting- Home Office    Consent:  The patient/guardian has verbally consented to: the potential risks and benefits of telemedicine (video visit) versus in person care; bill my insurance or make self-payment for services provided; and responsibility for payment of non-covered services.     Patient would like the video invitation sent by:  My Chart    Mode of Communication:  Video Conference via Amwell    Distant Location (Provider):  Off-site    As the provider I attest to compliance with applicable laws and regulations related to telemedicine.    DATA  Interactive Complexity: No  Crisis: No      Progress Since Last Session (Related to Symptoms / Goals / Homework):   Symptoms: Improving drained following mood instability    Homework: Partially completed      Episode of Care Goals: Satisfactory progress - PREPARATION (Decided to change - considering how); Intervened by negotiating a change plan and determining options / strategies for behavior change, identifying triggers, exploring social supports, and working towards setting a date to begin behavior change     Current / Ongoing Stressors and  Concerns:  Recognizing symptoms of borderline personality disorder. Rumination about past relationship(s) and their patterns Recognition of association of worth related to being attractive to men. Working on shifting name to [place] gender identity they/she at work. Return to work next week, concerns regarding follow through on  reports made prior to leave. Work overwhelm. Stopped vaping nicotine.      Treatment Objective(s) Addressed in This Session:   Reinforce  safety plan  Patient will identify and practice at least 5 strategies for more effectively managing Bipolar Disorder.  Patient will learn and practice grounding, DBT skills including distress tolerance, emotion regulation and mindfulness  Patient will identify and practice structure in sleep, ADLs, exercise and other wellness activities   Intervention:   Interpersonal Therapy: provided active listening and validation, utilized rapport building   Reinforced ADLs, medication in the am, focus on interests, healthy relationships. Reviewed DBT wise mind, introduced values exercise   Assessments completed prior to visit:  The following assessments were completed by patient for this visit:  PHQ2:       3/6/2024     1:00 PM 7/5/2022     3:27 PM   PHQ-2 ( 1999 [employer])   Q1: Little interest or pleasure in doing things 2 3   Q2: Feeling down, depressed or hopeless 2 3   PHQ-2 Score 4 6   Q1: Little interest or pleasure in doing things  Nearly every day   Q2: Feeling down, depressed or hopeless  Nearly every day   PHQ-2 Score  6     PHQ9:       10/31/2023     7:47 AM 12/20/2023    12:47 PM 1/2/2024     8:01 AM 1/15/2024     9:23 PM 1/24/2024     9:40 AM 3/6/2024     1:00 PM 4/11/2024     2:59 PM   PHQ-9 SCORE   PHQ-9 Total Score MyChart 16 (Moderately severe depression) 6 (Mild depression) 10 (Moderate depression) 16 (Moderately severe depression) 13 (Moderate depression)  7 (Mild depression)   PHQ-9 Total Score 16 6 10 16 13 19 7     GAD2:       10/24/2023    10:53 AM  1/24/2024     9:41 AM 2/13/2024     8:00 AM   BLANCA-2   Feeling nervous, anxious, or on edge 2 2 3   Not being able to stop or control worrying 2 2 3   BLANCA-2 Total Score 4    4 4    4 6     GAD7:       7/25/2023     2:51 PM 9/14/2023     9:10 AM 10/24/2023    10:53 AM 1/24/2024     9:41 AM 2/13/2024     8:00 AM   BLANCA-7 SCORE   Total Score 8 (mild anxiety) 8 (mild anxiety) 14 (moderate anxiety) 14 (moderate anxiety) 20 (severe anxiety)   Total Score 8 8 14    14 14    14 20     CAGE-AID:       10/24/2023    10:54 AM   CAGE-AID Total Score   Total Score 0   Total Score MyChart 0 (A total score of 2 or greater is considered clinically significant)     PROMIS 10-Global Health (all questions and answers displayed):       10/24/2023    10:54 AM 1/24/2024     9:42 AM 2/13/2024     8:01 AM   PROMIS 10   In general, would you say your health is: Good Very good Very good   In general, would you say your quality of life is: Good Very good Good   In general, how would you rate your physical health? Good Very good Very good   In general, how would you rate your mental health, including your mood and your ability to think? Fair Fair Fair   In general, how would you rate your satisfaction with your social activities and relationships? Good Fair Very good   In general, please rate how well you carry out your usual social activities and roles Good Very good Good   To what extent are you able to carry out your everyday physical activities such as walking, climbing stairs, carrying groceries, or moving a chair? Completely Completely Completely   In the past 7 days, how often have you been bothered by emotional problems such as feeling anxious, depressed, or irritable? Often Always Always   In the past 7 days, how would you rate your fatigue on average? Moderate Moderate Moderate   In the past 7 days, how would you rate your pain on average, where 0 means no pain, and 10 means worst imaginable pain? 3 5 5   In general, would you say your  health is: 3  4   In general, would you say your quality of life is: 3  3   In general, how would you rate your physical health? 3  4   In general, how would you rate your mental health, including your mood and your ability to think? 2  2   In general, how would you rate your satisfaction with your social activities and relationships? 3  4   In general, please rate how well you carry out your usual social activities and roles. (This includes activities at home, at work and in your community, and responsibilities as a parent, child, spouse, employee, friend, etc.) 3  3   To what extent are you able to carry out your everyday physical activities such as walking, climbing stairs, carrying groceries, or moving a chair? 5  5   In the past 7 days, how often have you been bothered by emotional problems such as feeling anxious, depressed, or irritable? 4  5   In the past 7 days, how would you rate your fatigue on average? 3  3   In the past 7 days, how would you rate your pain on average, where 0 means no pain, and 10 means worst imaginable pain? 3  5   Global Mental Health Score 10    10 9     10   Global Physical Health Score 15    15 15     15   PROMIS TOTAL - SUBSCORES 25    25 24     25       Information is confidential and restricted. Go to Review Flowsheets to unlock data.    Multiple values from one day are sorted in reverse-chronological order     [place] Suicide Severity Rating Scale (Lifetime/Recent)      10/24/2023    11:00 AM 4/4/2024     6:32 AM   [place] Suicide Severity Rating (Lifetime/Recent)   Q1 Wished to be Dead (Past Month) no 0-->no   Q2 Suicidal Thoughts (Past Month) no 0-->no   Q6 Suicide Behavior (Lifetime) no 0-->no   Level of Risk per Screen  no risks indicated         ASSESSMENT: Current Emotional / Mental Status (status of significant symptoms):   Risk status (Self / Other harm or suicidal ideation)   Patient denies current fears or concerns for personal safety.   Patient reports the  following current or recent suicidal ideation or behaviors: passive SI [id number] by distraction.   Patient denies current or recent homicidal ideation or behaviors.   Patient reports current or recent self injurious behavior or ideation including passive Ideation.   Patient denies other safety concerns.   Patient reports there has been no change in risk factors since their last session.     Patient reports there has been no change in protective factors since their last session.     A safety and risk management plan has been developed including: Patient consented to co-developed safety plan on 10/31/23.  Safety and risk management plan was reviewed.   Patient agreed to use safety plan should any safety concerns arise.  A copy was made available to the patient.     Appearance:   Appropriate    Eye Contact:   Good    Psychomotor Behavior: Normal    Attitude:   Cooperative    Orientation:   All   Speech    Rate / Production: Normal     Volume:  Normal    Mood:    Depressed    Affect:    Blunted    Thought Content:  Clear  Rumination    Thought Form:  Circumstantial   Insight:    Fair      Medication Review:   No changes to current psychiatric medication(s)     Medication Compliance:   Yes     Changes in Health Issues:   None reported     Chemical Use Review:   Substance Use: decrease in alcohol .  Patient reports frequency of use stopped drinking before surgery, limited drinking now, lacks desire to drink.  Provided encouragement towards sobriety        Tobacco Use: Yes, successfully quit to prepare for surgery April 4.  Patient reports frequency of use stopped. Does not need support.     Diagnosis:  1. Bipolar 2 disorder (H)      Collateral Reports Completed:   Not Applicable    PLAN: (Patient Tasks / Therapist Tasks / Other)  Use safety plan, use resources to move forward (accountability, emotional intelligence, intuition, loving kindness.) Work on values exercise, personal  priorities/interests    Marycruz Degroot, [facility]  5/7/2024    _________________________________________________________________                                            Individual Treatment Plan    Patient's Name: Cindy Contreras  YOB: 1995    Date of Creation: 1/2/24  Date Treatment Plan Last Reviewed/Revised:     DSM5 Diagnoses: 296.89 Bipolar II Disorder With seasonal pattern  Psychosocial / Contextual Factors:   PROMIS (reviewed every 90 days): 24 (10/25/23)    Referral / Collaboration:  Referral to another professional/service is not indicated at this time..    Anticipated number of session for this episode of care: 6-9 sessions  Anticipation frequency of session: Every other week  Anticipated Duration of each session: 53 or more minutes  Treatment plan will be reviewed in 90 days or when goals have been changed.     MeasurableTreatment Goal(s) related to diagnosis / functional impairment(s)  Goal 1: Patient will learn to reduce rumination, practice thought stopping to improve emotion regulation to improve focus and healthy behaviors relationships    I will know I've met my goal when tbc.      Objective #A (Patient Action)    Patient will  Use created safety plan when sx present, share with others .  Status: New - Date: 1/2/24      Intervention(s)  Therapist will teach  safety plan skills .    Objective #B  Patient will  identify values and put them into practice for daily living/decision-making .  Status:  New - Date: 1/2/24  Intervention(s)  Therapist will assign homework of values practice and process/integrate  identified values into therapy goals .    Objective #C    Patient will identify and practice at least 5 strategies for more effectively managing Bipolar Disorder.  Patient will learn and practice grounding, DBT skills including distress tolerance, emotion regulation and mindfulness  Patient will identify and practice structure in sleep, ADLs, exercise and other wellness  "activities  Status: New - Date: 1/2/24      Intervention(s)  Therapist will teach skills and assign homework .    Patient has reviewed and agreed to the above plan.      Marycruz Degroot, [facility]  January 2, 2024                [facility]                                       Cindy Contreras     SAFETY PLAN:  Step 1: Warning signs / cues (Thoughts, images, mood, situation, behavior) that a crisis may be developing:  Thoughts:  I'm a failure, I'm alone, Nobody cares about me  Images: flashbacks and visions of harm: past memories of cutting  Thinking Processes: ruminations (can't stop thinking about my problems): relationships  Mood: worsening depression, hopelessness, and mood swings  Behaviors: isolating/withdrawing , can't stop crying, not taking care of myself, not taking care of my responsibilities, sleeping too much, not sleeping enough, and increasing frequency and duration of dissociation  Situations: relationship problems, financial stress, and tough situations (ie car accident)    Step 2: Coping strategies - Things I can do to take my mind off of my problems without contacting another person (relaxation technique, physical activity):  Distress Tolerance Strategies:  play with my pet , listen to positive and upbeat music: play guitar, sensory based activities/self-soothe with five senses: essential oils, watch a funny movie: podcast-H3, read a book:  , change body temperature (ice pack/cold water) , paced breathing/progressive muscle relaxation, intense exercise: gym across the street  for 2-3 minutes , and play guitar  Physical Activities: go for a walk  Focus on helpful thoughts:  \"I will get through this\", \"Ride the wave\", remind myself of what is important to me: dog, family, friends, and my people are here for me  Step 3: People and social settings that provide distraction:   Mom, best friend Agatha, sister Angela   coffee shop, park, library, gym , and work   Step 4: Remind myself of people " and things that are important to me and worth living for:  family, friends, dog  Step 5: When I am in crisis, I can ask these people to help me use my safety plan:  Agatha, mom, stepmom,   Step 6: Making the environment safe:   be around others  Step 7: Professionals or agencies I can contact during a crisis:  Suicide Prevention Lifeline: Call or Text 986   Local Crisis Services:  [facility], Warm Line    Call 911 or go to my nearest emergency department.   I helped develop this safety plan and agree to use it when needed.  I have been given a copy of this plan.      Client signature _________________________________________________________________  Today s date:  10/31/2023  Completed by Provider Name/ Credentials:  Marycruz Degroot MSW LICSW  October 31, 2023  Adapted from Safety Plan Template 2008 Nelsy Cervantes and Igor Glasgow is reprinted with the express permission of the authors.  No portion of the Safety Plan Template may be reproduced without the express, written permission.  You can contact the authors at [email or web address] or [email or web address].

## 2024-05-21 ENCOUNTER — VIRTUAL VISIT (OUTPATIENT)
Dept: PSYCHOLOGY | Facility: CLINIC | Age: 29
End: 2024-05-21
Payer: COMMERCIAL

## 2024-05-21 DIAGNOSIS — F31.81 BIPOLAR 2 DISORDER (H): Primary | ICD-10-CM

## 2024-05-21 PROCEDURE — 90837 PSYTX W PT 60 MINUTES: CPT | Mod: 95

## 2024-05-21 NOTE — PROGRESS NOTES
[facility]                                     Progress Note    Patient Name: Cindy Contreras  Date: 5/21/24     Service Type: Individual      Session Start Time: 8:00 am  Session End Time: 8:53 am     Session Length: 53 min    Session #: 11    Attendees: Client attended alone    Service Modality:  Video Visit:      Provider verified identity through the following two step process.  Patient provided:  Patient is known previously to provider    Telemedicine Visit: The patient's condition can be safely assessed and treated via synchronous audio and visual telemedicine encounter.      Reason for Telemedicine Visit: Patient convenience (e.g. access to timely appointments / distance to available provider)    Originating Site (Patient Location): Patient's home    Distant Site (Provider Location): Provider Remote Setting- Home Office    Consent:  The patient/guardian has verbally consented to: the potential risks and benefits of telemedicine (video visit) versus in person care; bill my insurance or make self-payment for services provided; and responsibility for payment of non-covered services.     Patient would like the video invitation sent by:  My Chart    Mode of Communication:  [facility]    Distant Location (Provider):  Off-site    As the provider I attest to compliance with applicable laws and regulations related to telemedicine.    DATA  Interactive Complexity: No  Crisis: No      Progress Since Last Session (Related to Symptoms / Goals / Homework):   Symptoms: Improving drained following mood instability    Homework: Partially completed      Episode of Care Goals: Satisfactory progress - ACTION (Actively working towards change); Intervened by reinforcing change plan / affirming steps taken     Current / Ongoing Stressors and Concerns:  Recognizing symptoms of borderline personality disorder. Rumination about past relationship(s) and their patterns Recognition of association  of worth related to being attractive to men. Working on shifting name to G[place] gender identity they/she at work. Return to work next week, concerns regarding follow through on  reports made prior to leave. Work overwhelm. Stopped vaping nicotine.      Treatment Objective(s) Addressed in This Session:   Reinforce  safety plan  Patient will identify and practice at least 5 strategies for more effectively managing Bipolar Disorder.  Patient will learn and practice grounding, DBT skills including distress tolerance, emotion regulation and mindfulness  Patient will identify and practice structure in sleep, ADLs, exercise and other wellness activities   Intervention:   Interpersonal Therapy: provided active listening and validation   Reinforced ADLs, medication in the am, focus on prioritizing exploring new interests, healthy relationships. Reviewed DBT wise mind, introduced values exercise. Discussed recent symptoms, coping skills to address    Assessments completed prior to visit:  The following assessments were completed by patient for this visit:  PHQ2:       3/6/2024     1:00 PM 7/5/2022     3:27 PM   PHQ-2 ( 1999 [employer])   Q1: Little interest or pleasure in doing things 2 3   Q2: Feeling down, depressed or hopeless 2 3   PHQ-2 Score 4 6   Q1: Little interest or pleasure in doing things  Nearly every day   Q2: Feeling down, depressed or hopeless  Nearly every day   PHQ-2 Score  6     PHQ9:       10/31/2023     7:47 AM 12/20/2023    12:47 PM 1/2/2024     8:01 AM 1/15/2024     9:23 PM 1/24/2024     9:40 AM 3/6/2024     1:00 PM 4/11/2024     2:59 PM   PHQ-9 SCORE   PHQ-9 Total Score MyChart 16 (Moderately severe depression) 6 (Mild depression) 10 (Moderate depression) 16 (Moderately severe depression) 13 (Moderate depression)  7 (Mild depression)   PHQ-9 Total Score 16 6 10 16 13 19 7     GAD2:       10/24/2023    10:53 AM 1/24/2024     9:41 AM 2/13/2024     8:00 AM   BLANCA-2   Feeling nervous, anxious, or on edge 2 2 3    Not being able to stop or control worrying 2 2 3   BLANCA-2 Total Score 4    4 4    4 6     GAD7:       7/25/2023     2:51 PM 9/14/2023     9:10 AM 10/24/2023    10:53 AM 1/24/2024     9:41 AM 2/13/2024     8:00 AM   BLANCA-7 SCORE   Total Score 8 (mild anxiety) 8 (mild anxiety) 14 (moderate anxiety) 14 (moderate anxiety) 20 (severe anxiety)   Total Score 8 8 14    14 14    14 20     CAGE-AID:       10/24/2023    10:54 AM   CAGE-AID Total Score   Total Score 0   Total Score MyChart 0 (A total score of 2 or greater is considered clinically significant)     PROMIS 10-Global Health (all questions and answers displayed):       10/24/2023    10:54 AM 1/24/2024     9:42 AM 2/13/2024     8:01 AM   PROMIS 10   In general, would you say your health is: Good Very good Very good   In general, would you say your quality of life is: Good Very good Good   In general, how would you rate your physical health? Good Very good Very good   In general, how would you rate your mental health, including your mood and your ability to think? Fair Fair Fair   In general, how would you rate your satisfaction with your social activities and relationships? Good Fair Very good   In general, please rate how well you carry out your usual social activities and roles Good Very good Good   To what extent are you able to carry out your everyday physical activities such as walking, climbing stairs, carrying groceries, or moving a chair? Completely Completely Completely   In the past 7 days, how often have you been bothered by emotional problems such as feeling anxious, depressed, or irritable? Often Always Always   In the past 7 days, how would you rate your fatigue on average? Moderate Moderate Moderate   In the past 7 days, how would you rate your pain on average, where 0 means no pain, and 10 means worst imaginable pain? 3 5 5   In general, would you say your health is: 3  4   In general, would you say your quality of life is: 3  3   In general, how  would you rate your physical health? 3  4   In general, how would you rate your mental health, including your mood and your ability to think? 2  2   In general, how would you rate your satisfaction with your social activities and relationships? 3  4   In general, please rate how well you carry out your usual social activities and roles. (This includes activities at home, at work and in your community, and responsibilities as a parent, child, spouse, employee, friend, etc.) 3  3   To what extent are you able to carry out your everyday physical activities such as walking, climbing stairs, carrying groceries, or moving a chair? 5  5   In the past 7 days, how often have you been bothered by emotional problems such as feeling anxious, depressed, or irritable? 4  5   In the past 7 days, how would you rate your fatigue on average? 3  3   In the past 7 days, how would you rate your pain on average, where 0 means no pain, and 10 means worst imaginable pain? 3  5   Global Mental Health Score 10    10 9     10   Global Physical Health Score 15    15 15     15   PROMIS TOTAL - SUBSCORES 25    25 24     25       Information is confidential and restricted. Go to Review Flowsheets to unlock data.    Multiple values from one day are sorted in reverse-chronological order     [place] Suicide Severity Rating Scale (Lifetime/Recent)      10/24/2023    11:00 AM 4/4/2024     6:32 AM   [place] Suicide Severity Rating (Lifetime/Recent)   Q1 Wished to be Dead (Past Month) no 0-->no   Q2 Suicidal Thoughts (Past Month) no 0-->no   Q6 Suicide Behavior (Lifetime) no 0-->no   Level of Risk per Screen  no risks indicated         ASSESSMENT: Current Emotional / Mental Status (status of significant symptoms):   Risk status (Self / Other harm or suicidal ideation)   Patient denies current fears or concerns for personal safety.   Patient reports the following current or recent suicidal ideation or behaviors: passive SI [id number]-[id number]  by distraction.   Patient denies current or recent homicidal ideation or behaviors.   Patient reports current or recent self injurious behavior or ideation including passive Ideation.   Patient denies other safety concerns.   Patient reports there has been no change in risk factors since their last session.     Patient reports there has been no change in protective factors since their last session.     A safety and risk management plan has been developed including: Patient consented to co-developed safety plan on 10/31/23.  Safety and risk management plan was reviewed.   Patient agreed to use safety plan should any safety concerns arise.  A copy was made available to the patient.     Appearance:   Appropriate    Eye Contact:   Good    Psychomotor Behavior: Normal    Attitude:   Cooperative    Orientation:   All   Speech    Rate / Production: Normal     Volume:  Normal    Mood:    Depressed    Affect:    Blunted    Thought Content:  Clear  Rumination    Thought Form:  Circumstantial   Insight:    Fair      Medication Review:   No changes to current psychiatric medication(s)     Medication Compliance:   Yes     Changes in Health Issues:   None reported     Chemical Use Review:   Substance Use: decrease in alcohol .  Patient reports frequency of use stopped drinking before surgery, limited drinking now, lacks desire to drink.  Provided encouragement towards sobriety        Tobacco Use: Yes, successfully quit to prepare for surgery April 4.  Patient reports frequency of use stopped an but bought a pack following stressful interaction.      Diagnosis:  1. Bipolar 2 disorder (H)      Collateral Reports Completed:   Not Applicable    PLAN: (Patient Tasks / Therapist Tasks / Other)  Use safety plan, use resources to move forward (accountability, emotional intelligence, intuition, loving kindness.) Work on values exercise, personal priorities/interests. Use coping skills for symptoms    MARK Ojeda   5/21/2024    _________________________________________________________________                                            Individual Treatment Plan    Patient's Name: Cindy Contreras  YOB: 1995    Date of Creation: 1/2/24  Date Treatment Plan Last Reviewed/Revised:     DSM5 Diagnoses: 296.89 Bipolar II Disorder With seasonal pattern  Psychosocial / Contextual Factors:   PROMIS (reviewed every 90 days): 24 (10/25/23)    Referral / Collaboration:  Referral to another professional/service is not indicated at this time..    Anticipated number of session for this episode of care: 6-9 sessions  Anticipation frequency of session: Every other week  Anticipated Duration of each session: 53 or more minutes  Treatment plan will be reviewed in 90 days or when goals have been changed.     MeasurableTreatment Goal(s) related to diagnosis / functional impairment(s)  Goal 1: Patient will learn to reduce rumination, practice thought stopping to improve emotion regulation to improve focus and healthy behaviors relationships    I will know I've met my goal when tbc.      Objective #A (Patient Action)    Patient will  Use created safety plan when sx present, share with others .  Status: New - Date: 1/2/24      Intervention(s)  Therapist will teach  safety plan skills .    Objective #B  Patient will  identify values and put them into practice for daily living/decision-making .  Status:  New - Date: 1/2/24  Intervention(s)  Therapist will assign homework of values practice and process/integrate  identified values into therapy goals .    Objective #C    Patient will identify and practice at least 5 strategies for more effectively managing Bipolar Disorder.  Patient will learn and practice grounding, DBT skills including distress tolerance, emotion regulation and mindfulness  Patient will identify and practice structure in sleep, ADLs, exercise and other wellness activities  Status: New - Date: 1/2/24   "    Intervention(s)  Therapist will teach skills and assign homework .    Patient has reviewed and agreed to the above plan.      Marycruz Degroot, [facility]  January 2, 2024                [facility]                                       Cindy Contreras     SAFETY PLAN:  Step 1: Warning signs / cues (Thoughts, images, mood, situation, behavior) that a crisis may be developing:  Thoughts:  I'm a failure, I'm alone, Nobody cares about me  Images: flashbacks and visions of harm: past memories of cutting  Thinking Processes: ruminations (can't stop thinking about my problems): relationships  Mood: worsening depression, hopelessness, and mood swings  Behaviors: isolating/withdrawing , can't stop crying, not taking care of myself, not taking care of my responsibilities, sleeping too much, not sleeping enough, and increasing frequency and duration of dissociation  Situations: relationship problems, financial stress, and tough situations (ie car accident)    Step 2: Coping strategies - Things I can do to take my mind off of my problems without contacting another person (relaxation technique, physical activity):  Distress Tolerance Strategies:  play with my pet , listen to positive and upbeat music: play guitar, sensory based activities/self-soothe with five senses: essential oils, watch a funny movie: podcast-H3, read a book:  , change body temperature (ice pack/cold water) , paced breathing/progressive muscle relaxation, intense exercise: gym across the street  for 2-3 minutes , and play guitar  Physical Activities: go for a walk  Focus on helpful thoughts:  \"I will get through this\", \"Ride the wave\", remind myself of what is important to me: dog, family, friends, and my people are here for me  Step 3: People and social settings that provide distraction:   Mom, best friend Agatha, sister Angela   coffee shop, park, library, gym , and work   Step 4: Remind myself of people and things that are important to me and " worth living for:  family, friends, dog  Step 5: When I am in crisis, I can ask these people to help me use my safety plan:  Agatha, mom, stepmom,   Step 6: Making the environment safe:   be around others  Step 7: Professionals or agencies I can contact during a crisis:  Suicide Prevention Lifeline: Call or Text 988   Local Crisis Services:  [facility], Warm Line    Call 911 or go to my nearest emergency department.   I helped develop this safety plan and agree to use it when needed.  I have been given a copy of this plan.      Client signature _________________________________________________________________  Today s date:  10/31/2023  Completed by Provider Name/ Credentials:  Marycruz Degroot MSW LICSW  October 31, 2023  Adapted from Safety Plan Template 2008 Nelsy Cervantes and Igor Glasgow is reprinted with the express permission of the authors.  No portion of the Safety Plan Template may be reproduced without the express, written permission.  You can contact the authors at [email or web address] or [email or web address].

## 2024-05-22 ENCOUNTER — VIRTUAL VISIT (OUTPATIENT)
Dept: ENDOCRINOLOGY | Facility: CLINIC | Age: 29
End: 2024-05-22
Payer: COMMERCIAL

## 2024-05-22 VITALS — WEIGHT: 121 LBS | BODY MASS INDEX: 22.84 KG/M2 | HEIGHT: 61 IN

## 2024-05-22 DIAGNOSIS — E66.811 CLASS 1 OBESITY WITHOUT SERIOUS COMORBIDITY WITH BODY MASS INDEX (BMI) OF 33.0 TO 33.9 IN ADULT, UNSPECIFIED OBESITY TYPE: ICD-10-CM

## 2024-05-22 PROCEDURE — 99214 OFFICE O/P EST MOD 30 MIN: CPT | Mod: 95

## 2024-05-22 RX ORDER — TOPIRAMATE 25 MG/1
TABLET, FILM COATED ORAL
Qty: 90 TABLET | Refills: 3 | Status: SHIPPED | OUTPATIENT
Start: 2024-05-22

## 2024-05-22 RX ORDER — PHENTERMINE HYDROCHLORIDE 15 MG/1
15 CAPSULE ORAL EVERY MORNING
Qty: 30 CAPSULE | Refills: 3 | Status: SHIPPED | OUTPATIENT
Start: 2024-05-22

## 2024-05-22 ASSESSMENT — PAIN SCALES - GENERAL: PAINLEVEL: NO PAIN (0)

## 2024-05-22 NOTE — PROGRESS NOTES
Virtual Visit Details    Type of service:  Video Visit     Originating Location (pt. Location): Home    Distant Location (provider location):  Off-site  Platform used for Video Visit: [facility] Medical Weight Management Note     Cindy Contreras  MRN:  [id number]  :  1995  ROBERTO:  2024    Dear ABILIO Lima CNP,    I had the pleasure of seeing your patient Cindy Contreras. She is a 28 year old adult who I am continuing to see for treatment of obesity related to:        2023     1:56 PM   --   I have the following health issues associated with obesity None of the above   I have the following symptoms associated with obesity Depression    Back Pain    Fatigue       Assessment & Plan   Problem List Items Addressed This Visit    None  Visit Diagnoses       Class 1 obesity without serious comorbidity with body mass index (BMI) of 33.0 to 33.9 in adult, unspecified obesity type        Relevant Medications    topiramate (TOPAMAX) 25 MG tablet    phentermine 15 MG capsule        Has lost 54lbs and 30% TBW since first visit 3/2023. Overall is very happy with weight is content to maintain here. We will continue on Phentermine and Topiramate to help with weight maintaince. She will reach out if she notices any extreme weight loss or weight regain.     Since last visit she has established psychiatric care within [facility]. She has had one hypomania episode that lasted longer then usual in January. She denies any episodes since. Discussed that phentermine could contribute to this and she will monitor this closely. I will also reach out to her psychiatric team.          Continue Phentermine 15mg once daily. Refills sent   Continue Topiramate 75mg at night. Refills sent   [facility] pharmacist in 2-3months   Kate Figueroa in 5-6 months     INTERVAL HISTORY:  New MW - 2023   GLP-1 not covered by insurance   Started Topiramate with approval from psychiatrist   Can start Phentermine in the future  if needed - approved by psychiatrist   Insulin Resistance - CHANDNI-IR score 5.8    6/14/23 - restarted topiramate, was off for 3 weeks due to increase in depression symptoms    Last seen 8/14/2023 - continued topiramate, started Phentermine - approved by psychiatrist with hx of Bipolar II      Just had breast reduction and has already been really helpful with back pain already.     Has seen some recent weight loss after breast reduction surgery. Is at goal weight and is content to stay here.     Anti-obesity medication history    Current:   Phentermine 15mg + Topiramate 75mg - no side effects. Does not think it had any effect on mood. /80.     Had a more significant hypomania episode in January. Is also being worked up for BPD, so thinks there was a social aspect to it.       Recent diet changes: Eating 2 meals a day. No snacks. Drinking water throughout the day. Hunger is well controlled.   Breakfast - skips  Lunch - sushi, salad, soup, salmon   Dinner - protein + rice + vegetable     Recent exercise/activity changes: has been limited due to breast reduction in April. Just got restrictions lifted this past week. Goal to get back into going to the gym.     Recent stressors: work and school continues to be stressful. Thinks she might stop [facility] program, does not feel like it is a good fit any more. 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 No longer moving to [place].     Recent sleep changes: Has been harder since surgery, but has improved over the past week with being able to sleep on stomach and side.     Vitamins/Labs: BMP and Vitamin normal.     CURRENT WEIGHT:   121 lbs 0 oz    Initial Weight (lbs): 175 lbs     Cumulative weight loss (lbs): 54  Weight Loss Percentage: 30.86%    Wt Readings from Last 5 Encounters:   05/22/24 54.9 kg (121 lb)   04/16/24 56.9 kg (125 lb 6.4 oz)   04/04/24 59 kg (130 lb)   03/13/24 62.1 kg (137 lb)   03/12/24 61.2 kg (135 lb)             5/22/2024     8:22 AM    Changes and Difficulties   I have made the following changes to my diet since my last visit: none   With regards to my diet, I am still struggling with: consistency   I have made the following changes to my activity/exercise since my last visit: surgery   With regards to my activity/exercise, I am still struggling with: surgery         MEDICATIONS:   Current Outpatient Medications   Medication Sig Dispense Refill    ARIPiprazole (ABILIFY) 5 MG tablet Take 0.5 tablets (2.5 mg) by mouth daily 45 tablet 0    lactobacillus rhamnosus, GG, (CULTURELL) capsule Take 1 capsule by mouth 2 times daily      lamoTRIgine (LAMICTAL) 200 MG tablet Take 1 tablet (200 mg) by mouth daily Take 1 tablet (200 mg) daily. 30 tablet 1    norgestimate-ethinyl estradiol (ORTHO-CYCLEN) 0.25-35 MG-MCG tablet Take 1 tablet by mouth daily 84 tablet 3    phentermine 15 MG capsule Take 1 capsule (15 mg) by mouth every morning 30 capsule 3    spironolactone (ALDACTONE) 100 MG tablet Take 1 tablet (100 mg) by mouth daily 90 tablet 3    topiramate (TOPAMAX) 25 MG tablet Take  75mg at bedtime 90 tablet 3    Cholecalciferol (VITAMIN D-3) 125 MCG (5000 UT) TABS Take 1 capsule by mouth daily 30 tablet 3    lamoTRIgine (LAMICTAL) 25 MG tablet Take 1 tablet (25 mg) by mouth daily for 14 days, THEN 2 tablets (50 mg) daily for 14 days. 42 tablet 0    loratadine (CLARITIN) 10 MG tablet Take 10 mg by mouth daily as needed for allergies      multivitamin w/minerals (MULTI-VITAMIN) tablet Take 1 tablet by mouth daily      ondansetron (ZOFRAN ODT) 4 MG ODT tab Take 1 tablet (4 mg) by mouth every 8 hours as needed for nausea (Patient not taking: Reported on 4/16/2024) 4 tablet 0    oxyCODONE (ROXICODONE) 5 MG tablet Take 1-2 tablets (5-10 mg) by mouth every 6 hours as needed for moderate to severe pain (Patient not taking: Reported on 4/16/2024) 20 tablet 0    senna-docusate (SENOKOT-S/PERICOLACE) 8.6-50 MG tablet Take 1-2 tablets by mouth 2 times daily (Patient  "not taking: Reported on 4/16/2024) 30 tablet 0           5/22/2024     8:22 AM   Weight Loss Medication History Reviewed With Patient   Which weight loss medications are you currently taking on a regular basis? Phentermine    Topamax (topiramate)   Are you having any side effects from the weight loss medication that we have prescribed you? No         Objective    Ht 1.549 m (5' 1\")   Wt 54.9 kg (121 lb)   BMI 22.86 kg/m      Vitals - Patient Reported  Pain Score: No Pain (0)      PHYSICAL EXAM:    GENERAL: alert and no distress  EYES: Eyes grossly normal to inspection.  No discharge or erythema, or obvious scleral/conjunctival abnormalities.  RESP: No audible wheeze, cough, or visible cyanosis.    SKIN: Visible skin clear. No significant rash, abnormal pigmentation or lesions.  NEURO: Cranial nerves grossly intact.  Mentation and speech appropriate for age.  PSYCH: Appropriate affect, tone, and pace of words        Sincerely,    Kate Rasheed PA-C      33 minutes spent by me on the date of the encounter doing chart review, history and exam, documentation and further activities per the note  "

## 2024-05-22 NOTE — NURSING NOTE
Is the patient currently in the state of [place]? YES    Visit mode:VIDEO    If the visit is dropped, the patient can be reconnected by: VIDEO VISIT: Text to cell phone:   Telephone Information:   Mobile [phone]       Will anyone else be joining the visit? NO  (If patient encounters technical issues they should call [phone])    How would you like to obtain your AVS? MyChart    Are changes needed to the allergy or medication list? Yes Using Astepro  nasal spray.  Please add to med list.    Are refills needed on medications prescribed by this physician? YES     Reason for visit: RECHECK    Wt other than 24 hrs:    Pain more than one location:    Arlin Zelaya VVF     PT needs refills on Abilify and Lamotrigine need refills. Pt is wondering if you can refill.

## 2024-05-22 NOTE — LETTER
2024       RE: Cindy Contreras  [address]  [facility] [place] [address]     Dear Colleague,    Thank you for referring your patient, Cindy Contreras, to the [facility] [place] at [facility]. Please see a copy of my visit note below.    Virtual Visit Details    Type of service:  Video Visit     Originating Location (pt. Location): Home    Distant Location (provider location):  Off-site  Platform used for Video Visit: [facility] Medical Weight Management Note     Cindy Contreras  MRN:  [id number]  :  1995  ROBERTO:  2024    Dear ABILIO Lima CNP,    I had the pleasure of seeing your patient Cindy Contreras. She is a 28 year old adult who I am continuing to see for treatment of obesity related to:        2023     1:56 PM   --   I have the following health issues associated with obesity None of the above   I have the following symptoms associated with obesity Depression    Back Pain    Fatigue       Assessment & Plan  Problem List Items Addressed This Visit    None  Visit Diagnoses       Class 1 obesity without serious comorbidity with body mass index (BMI) of 33.0 to 33.9 in adult, unspecified obesity type        Relevant Medications    topiramate (TOPAMAX) 25 MG tablet    phentermine 15 MG capsule        Has lost 54lbs and 30% TBW since first visit 3/2023. Overall is very happy with weight is content to maintain here. We will continue on Phentermine and Topiramate to help with weight maintaince. She will reach out if she notices any extreme weight loss or weight regain.     Since last visit she has established psychiatric care within [facility]. She has had one hypomania episode that lasted longer then usual in January. She denies any episodes since. Discussed that phentermine could contribute to this and she will monitor this closely. I will also reach out to her psychiatric team.           Continue Phentermine 15mg once daily. Refills sent   Continue Topiramate 75mg at night. Refills sent   [facility] pharmacist in 2-3months   [place] in 5-6 months     INTERVAL HISTORY:  New [facility] - 2/21/2023   GLP-1 not covered by insurance   Started Topiramate with approval from psychiatrist   Can start Phentermine in the future if needed - approved by psychiatrist   Insulin Resistance - CHANDNI-IR score 5.8    6/14/23 - restarted topiramate, was off for 3 weeks due to increase in depression symptoms    Last seen 8/14/2023 - continued topiramate, started Phentermine - approved by psychiatrist with hx of Bipolar II      Just had breast reduction and has already been really helpful with back pain already.     Has seen some recent weight loss after breast reduction surgery. Is at goal weight and is content to stay here.     Anti-obesity medication history    Current:   Phentermine 15mg + Topiramate 75mg - no side effects. Does not think it had any effect on mood. /80.     Had a more significant hypomania episode in January. Is also being worked up for BPD, so thinks there was a social aspect to it.       Recent diet changes: Eating 2 meals a day. No snacks. Drinking water throughout the day. Hunger is well controlled.   Breakfast - skips  Lunch - sushi, salad, soup, salmon   Dinner - protein + rice + vegetable     Recent exercise/activity changes: has been limited due to breast reduction in April. Just got restrictions lifted this past week. Goal to get back into going to the gym.     Recent stressors: work and school continues to be stressful. Thinks she might stop [facility] program, does not feel like it is a good fit any more. 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 No longer moving to [place].     Recent sleep changes: Has been harder since surgery, but has improved over the past week with being able to sleep on stomach and side.     Vitamins/Labs: BMP and Vitamin normal.     CURRENT WEIGHT:    121 lbs 0 oz    Initial Weight (lbs): 175 lbs     Cumulative weight loss (lbs): 54  Weight Loss Percentage: 30.86%    Wt Readings from Last 5 Encounters:   05/22/24 54.9 kg (121 lb)   04/16/24 56.9 kg (125 lb 6.4 oz)   04/04/24 59 kg (130 lb)   03/13/24 62.1 kg (137 lb)   03/12/24 61.2 kg (135 lb)             5/22/2024     8:22 AM   Changes and Difficulties   I have made the following changes to my diet since my last visit: none   With regards to my diet, I am still struggling with: consistency   I have made the following changes to my activity/exercise since my last visit: surgery   With regards to my activity/exercise, I am still struggling with: surgery         MEDICATIONS:   Current Outpatient Medications   Medication Sig Dispense Refill    ARIPiprazole (ABILIFY) 5 MG tablet Take 0.5 tablets (2.5 mg) by mouth daily 45 tablet 0    lactobacillus rhamnosus, GG, (CULTURELL) capsule Take 1 capsule by mouth 2 times daily      lamoTRIgine (LAMICTAL) 200 MG tablet Take 1 tablet (200 mg) by mouth daily Take 1 tablet (200 mg) daily. 30 tablet 1    norgestimate-ethinyl estradiol (ORTHO-CYCLEN) 0.25-35 MG-MCG tablet Take 1 tablet by mouth daily 84 tablet 3    phentermine 15 MG capsule Take 1 capsule (15 mg) by mouth every morning 30 capsule 3    spironolactone (ALDACTONE) 100 MG tablet Take 1 tablet (100 mg) by mouth daily 90 tablet 3    topiramate (TOPAMAX) 25 MG tablet Take  75mg at bedtime 90 tablet 3    Cholecalciferol (VITAMIN D-3) 125 MCG (5000 UT) TABS Take 1 capsule by mouth daily 30 tablet 3    lamoTRIgine (LAMICTAL) 25 MG tablet Take 1 tablet (25 mg) by mouth daily for 14 days, THEN 2 tablets (50 mg) daily for 14 days. 42 tablet 0    loratadine (CLARITIN) 10 MG tablet Take 10 mg by mouth daily as needed for allergies      multivitamin w/minerals (MULTI-VITAMIN) tablet Take 1 tablet by mouth daily      ondansetron (ZOFRAN ODT) 4 MG ODT tab Take 1 tablet (4 mg) by mouth every 8 hours as needed for nausea (Patient  "not taking: Reported on 4/16/2024) 4 tablet 0    oxyCODONE (ROXICODONE) 5 MG tablet Take 1-2 tablets (5-10 mg) by mouth every 6 hours as needed for moderate to severe pain (Patient not taking: Reported on 4/16/2024) 20 tablet 0    senna-docusate (SENOKOT-S/PERICOLACE) 8.6-50 MG tablet Take 1-2 tablets by mouth 2 times daily (Patient not taking: Reported on 4/16/2024) 30 tablet 0           5/22/2024     8:22 AM   Weight Loss Medication History Reviewed With Patient   Which weight loss medications are you currently taking on a regular basis? Phentermine    Topamax (topiramate)   Are you having any side effects from the weight loss medication that we have prescribed you? No         Objective   Ht 1.549 m (5' 1\")   Wt 54.9 kg (121 lb)   BMI 22.86 kg/m      Vitals - Patient Reported  Pain Score: No Pain (0)      PHYSICAL EXAM:    GENERAL: alert and no distress  EYES: Eyes grossly normal to inspection.  No discharge or erythema, or obvious scleral/conjunctival abnormalities.  RESP: No audible wheeze, cough, or visible cyanosis.    SKIN: Visible skin clear. No significant rash, abnormal pigmentation or lesions.  NEURO: Cranial nerves grossly intact.  Mentation and speech appropriate for age.  PSYCH: Appropriate affect, tone, and pace of words      Sincerely,    Kate Rasheed PA-C      33 minutes spent by me on the date of the encounter doing chart review, history and exam, documentation and further activities per the note  "

## 2024-05-22 NOTE — PROGRESS NOTES
"Virtual Visit Details    Type of service:  Video Visit     Originating Location (pt. Location): {video visit patient location:628942::\"Home\"}  {PROVIDER LOCATION On-site should be selected for visits conducted from your clinic location or adjoining [facility] hospital, academic office, or other nearby [facility] building. Off-site should be selected for all other provider locations, including home:[address]}  Distant Location (provider location):  {virtual location provider:663018}  Platform used for Video Visit: {Virtual Visit Platforms:698161::\"[employer]\"}  "

## 2024-05-29 ENCOUNTER — OFFICE VISIT (OUTPATIENT)
Dept: PLASTIC SURGERY | Facility: CLINIC | Age: 29
End: 2024-05-29
Payer: COMMERCIAL

## 2024-05-29 VITALS
HEIGHT: 61 IN | SYSTOLIC BLOOD PRESSURE: 104 MMHG | HEART RATE: 84 BPM | OXYGEN SATURATION: 100 % | BODY MASS INDEX: 22.86 KG/M2 | DIASTOLIC BLOOD PRESSURE: 74 MMHG

## 2024-05-29 DIAGNOSIS — N62 LARGE BREASTS: Primary | ICD-10-CM

## 2024-05-29 PROCEDURE — 99024 POSTOP FOLLOW-UP VISIT: CPT | Performed by: PLASTIC SURGERY

## 2024-05-29 ASSESSMENT — PAIN SCALES - GENERAL: PAINLEVEL: NO PAIN (0)

## 2024-05-29 NOTE — LETTER
5/29/2024       RE: Cindy TONEY Ben  [address] [facility] [address]     Dear Colleague,    Thank you for referring your patient, Cindy Contreras, to the [facility] PLASTIC AND RECONSTRUCTIVE SURGERY CLINIC [place] at [facility]. Please see a copy of my visit note below.    PRESENTING COMPLAINT:  Post-operative visit s/p bilateral breast reduction done on 4/4/2024     HISTORY OF PRESENTING COMPLAINT: The patient is here for post-operative visit.  The patient is being seen in the presence of my nurse.     Done extremely well.  Happy the results.  Pathology benign.    On exam: Vital signs stable afebrile.  Both breasts are healed.  Left breast is slightly higher than the right side.  IMF is also slightly higher on the left compared to right.     ASSESSMENT AND PLAN:  Based upon the above findings, the patient is here for post-operative visit.     Advise aggressive moisturization.  Get to know her breast well.  No restrictions.  See us back as needed.    All questions were answered.  The patient was happy with the visit.        Again, thank you for allowing me to participate in the care of your patient.      Sincerely,    FENG Pollock MD

## 2024-05-29 NOTE — PROGRESS NOTES
PRESENTING COMPLAINT:  Post-operative visit s/p bilateral breast reduction done on 4/4/2024     HISTORY OF PRESENTING COMPLAINT: The patient is here for post-operative visit.  The patient is being seen in the presence of my nurse.     Done extremely well.  Happy the results.  Pathology benign.    On exam: Vital signs stable afebrile.  Both breasts are healed.  Left breast is slightly higher than the right side.  IMF is also slightly higher on the left compared to right.     ASSESSMENT AND PLAN:  Based upon the above findings, the patient is here for post-operative visit.     Advise aggressive moisturization.  Get to know her breast well.  No restrictions.  See us back as needed.    All questions were answered.  The patient was happy with the visit.

## 2024-05-29 NOTE — NURSING NOTE
"Chief Complaint   Patient presents with    Surgical Followup     Post-op, DOS 4/4/24.       Vitals:    05/29/24 0921   BP: 104/74   BP Location: Left arm   Patient Position: Sitting   Cuff Size: Adult Regular   Pulse: 84   SpO2: 100%   Height: 1.549 m (5' 1\")       Body mass index is 22.86 kg/m .      Davie Lee, EMT    "

## 2024-06-01 DIAGNOSIS — F31.81 BIPOLAR 2 DISORDER (H): ICD-10-CM

## 2024-06-03 RX ORDER — ARIPIPRAZOLE 5 MG/1
2.5 TABLET ORAL DAILY
Qty: 15 TABLET | Refills: 0 | Status: SHIPPED | OUTPATIENT
Start: 2024-06-03 | End: 2024-07-24

## 2024-06-03 RX ORDER — LAMOTRIGINE 200 MG/1
200 TABLET ORAL DAILY
Qty: 30 TABLET | Refills: 0 | Status: SHIPPED | OUTPATIENT
Start: 2024-06-03 | End: 2024-09-03

## 2024-06-03 NOTE — TELEPHONE ENCOUNTER
Last Seen 3/6  RTC 4 weeks on BCT  Cancel None  No-Show None    Next Appt None    Incoming Refill From [facility] via interface/fax    Medication Requested   lamoTRIgine (LAMICTAL) 200 MG tablet   Directions   Take 1 tablet (200 mg) by mouth daily Take 1 tablet (200 mg) daily.   Qty 30  Last Refill 5/6    Medication Requested   ARIPiprazole (ABILIFY) 5 MG tablet   Directions   Take 0.5 tablets (2.5 mg) by mouth daily   Qty 45  Last Refill 5/6    Medication pended and routed to provider for approval.

## 2024-06-05 ENCOUNTER — VIRTUAL VISIT (OUTPATIENT)
Dept: PSYCHOLOGY | Facility: CLINIC | Age: 29
End: 2024-06-05
Payer: COMMERCIAL

## 2024-06-05 DIAGNOSIS — F31.81 BIPOLAR 2 DISORDER (H): Primary | ICD-10-CM

## 2024-06-05 PROCEDURE — 90837 PSYTX W PT 60 MINUTES: CPT | Mod: 95

## 2024-06-05 ASSESSMENT — ANXIETY QUESTIONNAIRES
IF YOU CHECKED OFF ANY PROBLEMS ON THIS QUESTIONNAIRE, HOW DIFFICULT HAVE THESE PROBLEMS MADE IT FOR YOU TO DO YOUR WORK, TAKE CARE OF THINGS AT HOME, OR GET ALONG WITH OTHER PEOPLE: VERY DIFFICULT
GAD7 TOTAL SCORE: 20
4. TROUBLE RELAXING: NEARLY EVERY DAY
7. FEELING AFRAID AS IF SOMETHING AWFUL MIGHT HAPPEN: MORE THAN HALF THE DAYS
GAD7 TOTAL SCORE: 20
3. WORRYING TOO MUCH ABOUT DIFFERENT THINGS: NEARLY EVERY DAY
1. FEELING NERVOUS, ANXIOUS, OR ON EDGE: NEARLY EVERY DAY
GAD7 TOTAL SCORE: 20
5. BEING SO RESTLESS THAT IT IS HARD TO SIT STILL: NEARLY EVERY DAY
6. BECOMING EASILY ANNOYED OR IRRITABLE: NEARLY EVERY DAY
2. NOT BEING ABLE TO STOP OR CONTROL WORRYING: NEARLY EVERY DAY
7. FEELING AFRAID AS IF SOMETHING AWFUL MIGHT HAPPEN: MORE THAN HALF THE DAYS
8. IF YOU CHECKED OFF ANY PROBLEMS, HOW DIFFICULT HAVE THESE MADE IT FOR YOU TO DO YOUR WORK, TAKE CARE OF THINGS AT HOME, OR GET ALONG WITH OTHER PEOPLE?: VERY DIFFICULT

## 2024-06-05 ASSESSMENT — PATIENT HEALTH QUESTIONNAIRE - PHQ9
SUM OF ALL RESPONSES TO PHQ QUESTIONS 1-9: 9
SUM OF ALL RESPONSES TO PHQ QUESTIONS 1-9: 9
10. IF YOU CHECKED OFF ANY PROBLEMS, HOW DIFFICULT HAVE THESE PROBLEMS MADE IT FOR YOU TO DO YOUR WORK, TAKE CARE OF THINGS AT HOME, OR GET ALONG WITH OTHER PEOPLE: SOMEWHAT DIFFICULT

## 2024-06-05 NOTE — PROGRESS NOTES
[facility]                                     Progress Note    Patient Name: Cindy Contreras  Date: 6/5/24     Service Type: Individual      Session Start Time: 4:05 pm  Session End Time: 4:58 pm     Session Length: 53 min    Session #: 12    Attendees: Client attended alone    Service Modality:  Video Visit:      Provider verified identity through the following two step process.  Patient provided:  Patient is known previously to provider    Telemedicine Visit: The patient's condition can be safely assessed and treated via synchronous audio and visual telemedicine encounter.      Reason for Telemedicine Visit: Patient convenience (e.g. access to timely appointments / distance to available provider)    Originating Site (Patient Location): Patient's home    Distant Site (Provider Location): Provider Remote Setting- Home Office    Consent:  The patient/guardian has verbally consented to: the potential risks and benefits of telemedicine (video visit) versus in person care; bill my insurance or make self-payment for services provided; and responsibility for payment of non-covered services.     Patient would like the video invitation sent by:  My Chart    Mode of Communication:  Video Conference via Amwell    Distant Location (Provider):  Off-site    As the provider I attest to compliance with applicable laws and regulations related to telemedicine.    DATA  Interactive Complexity: No  Crisis: No      Progress Since Last Session (Related to Symptoms / Goals / Homework):   Symptoms: Improving noticing anxiety, increasing impulsivity.     Homework: Partially completed      Episode of Care Goals: Satisfactory progress - ACTION (Actively working towards change); Intervened by reinforcing change plan / affirming steps taken     Current / Ongoing Stressors and Concerns:  Financial pressure to complete final class in order to avoid having to repay large loan. Recognizing symptoms of borderline personality  disorder. Rumination about past relationship(s) and their patterns Recognition of association of worth related to being attractive to men. Working on shifting name to [place] gender identity they/she at work. Work personnel overwhelm. Return to vaping nicotine.      Treatment Objective(s) Addressed in This Session:   Reinforce  safety plan  Patient will identify and practice at least 5 strategies for more effectively managing Bipolar Disorder.  Patient will learn and practice grounding, DBT skills including distress tolerance, emotion regulation and mindfulness  Patient will identify and practice structure in sleep, ADLs, exercise and other wellness activities   Intervention:   Interpersonal Therapy: provided active listening and validation   Reinforced ADLs, medication in the am, focus on prioritizing exploring new interests, healthy relationships. Discussed recent impulsivity, revisited coping skills-grounding, distress tolerance. Validated interest in diagnostic clarification-provider to write order for ADHD and BPD assessment.     Assessments completed prior to visit:  The following assessments were completed by patient for this visit:  PHQ2:       5/22/2024     8:19 AM 5/22/2024     8:18 AM 3/6/2024     1:00 PM 7/5/2022     3:27 PM   PHQ-2 ( 1999 [employer])   Q1: Little interest or pleasure in doing things 0 0 2 3   Q2: Feeling down, depressed or hopeless 0 0 2 3   PHQ-2 Score 0 0 4 6   Q1: Little interest or pleasure in doing things    Nearly every day   Q2: Feeling down, depressed or hopeless    Nearly every day   PHQ-2 Score    6     PHQ9:       12/20/2023    12:47 PM 1/2/2024     8:01 AM 1/15/2024     9:23 PM 1/24/2024     9:40 AM 3/6/2024     1:00 PM 4/11/2024     2:59 PM 6/5/2024     3:51 PM   PHQ-9 SCORE   PHQ-9 Total Score MyChart 6 (Mild depression) 10 (Moderate depression) 16 (Moderately severe depression) 13 (Moderate depression)  7 (Mild depression) 9 (Mild depression)   PHQ-9 Total Score 6 10 16 13 19 7  9     GAD2:       10/24/2023    10:53 AM 1/24/2024     9:41 AM 2/13/2024     8:00 AM 6/5/2024     3:51 PM   BLANCA-2   Feeling nervous, anxious, or on edge 2 2 3 3   Not being able to stop or control worrying 2 2 3 3   BLANCA-2 Total Score 4    4 4    4 6 6     GAD7:       7/25/2023     2:51 PM 9/14/2023     9:10 AM 10/24/2023    10:53 AM 1/24/2024     9:41 AM 2/13/2024     8:00 AM 6/5/2024     3:51 PM   BLANCA-7 SCORE   Total Score 8 (mild anxiety) 8 (mild anxiety) 14 (moderate anxiety) 14 (moderate anxiety) 20 (severe anxiety) 20 (severe anxiety)   Total Score 8 8 14    14 14    14 20 20     CAGE-AID:       10/24/2023    10:54 AM   CAGE-AID Total Score   Total Score 0   Total Score MyChart 0 (A total score of 2 or greater is considered clinically significant)     PROMIS 10-Global Health (all questions and answers displayed):       10/24/2023    10:54 AM 1/24/2024     9:42 AM 2/13/2024     8:01 AM 5/22/2024     8:21 AM 6/5/2024     3:52 PM   PROMIS 10   In general, would you say your health is: Good Very good Very good Very good Good   In general, would you say your quality of life is: Good Very good Good Very good Fair   In general, how would you rate your physical health? Good Very good Very good Very good Good   In general, how would you rate your mental health, including your mood and your ability to think? Fair Fair Fair Fair Poor   In general, how would you rate your satisfaction with your social activities and relationships? Good Fair Very good Good Fair   In general, please rate how well you carry out your usual social activities and roles Good Very good Good Good Fair   To what extent are you able to carry out your everyday physical activities such as walking, climbing stairs, carrying groceries, or moving a chair? Completely Completely Completely Completely Completely   In the past 7 days, how often have you been bothered by emotional problems such as feeling anxious, depressed, or irritable? Often Always Always  Always Often   In the past 7 days, how would you rate your fatigue on average? Moderate Moderate Moderate Moderate Moderate   In the past 7 days, how would you rate your pain on average, where 0 means no pain, and 10 means worst imaginable pain? 3 5 5 2 1   In general, would you say your health is: 3  4 4 3   In general, would you say your quality of life is: 3  3 4 2   In general, how would you rate your physical health? 3  4 4 3   In general, how would you rate your mental health, including your mood and your ability to think? 2  2 2 1   In general, how would you rate your satisfaction with your social activities and relationships? 3  4 3 2   In general, please rate how well you carry out your usual social activities and roles. (This includes activities at home, at work and in your community, and responsibilities as a parent, child, spouse, employee, friend, etc.) 3  3 3 2   To what extent are you able to carry out your everyday physical activities such as walking, climbing stairs, carrying groceries, or moving a chair? 5  5 5 5   In the past 7 days, how often have you been bothered by emotional problems such as feeling anxious, depressed, or irritable? 4  5 5 4   In the past 7 days, how would you rate your fatigue on average? 3  3 3 3   In the past 7 days, how would you rate your pain on average, where 0 means no pain, and 10 means worst imaginable pain? 3  5 2 1   Global Mental Health Score 10    10 9     10 10 7   Global Physical Health Score 15    15 15     15 16 15   PROMIS TOTAL - SUBSCORES 25    25 24     25 26 22       Information is confidential and restricted. Go to Review Flowsheets to unlock data.    Multiple values from one day are sorted in reverse-chronological order     [place] Suicide Severity Rating Scale (Lifetime/Recent)      10/24/2023    11:00 AM 4/4/2024     6:32 AM   [place] Suicide Severity Rating (Lifetime/Recent)   Q1 Wished to be Dead (Past Month) no 0-->no   Q2 Suicidal Thoughts  (Past Month) no 0-->no   Q6 Suicide Behavior (Lifetime) no 0-->no   Level of Risk per Screen  no risks indicated         ASSESSMENT: Current Emotional / Mental Status (status of significant symptoms):   Risk status (Self / Other harm or suicidal ideation)   Patient denies current fears or concerns for personal safety.   Patient reports the following current or recent suicidal ideation or behaviors: passive SI [id number] by distraction.   Patient denies current or recent homicidal ideation or behaviors.   Patient reports current or recent self injurious behavior or ideation including passive Ideation.   Patient denies other safety concerns.   Patient reports there has been no change in risk factors since their last session.     Patient reports there has been no change in protective factors since their last session.     A safety and risk management plan has been developed including: Patient consented to co-developed safety plan on 10/31/23.  Safety and risk management plan was reviewed.   Patient agreed to use safety plan should any safety concerns arise.  A copy was made available to the patient.     Appearance:   Appropriate    Eye Contact:   Good    Psychomotor Behavior: Normal    Attitude:   Cooperative    Orientation:   All   Speech    Rate / Production: Normal     Volume:  Normal    Mood:    Anxious    Affect:    Blunted    Thought Content:  Clear  Rumination    Thought Form:  Circumstantial   Insight:    Fair      Medication Review:   No changes to current psychiatric medication(s)     Medication Compliance:   Yes     Changes in Health Issues:   None reported     Chemical Use Review:   Substance Use: decrease in alcohol .  Patient reports frequency of use stopped drinking before surgery, limited drinking now, lacks desire to drink.  Provided encouragement towards sobriety        Tobacco Use: No change in amount of tobacco use since last session.  Provided encouragement to quit      Diagnosis:  1. Bipolar 2 disorder (H)      Collateral Reports Completed:   Not Applicable    PLAN: (Patient Tasks / Therapist Tasks / Other)  Use safety plan, use resources to move forward (accountability, emotional intelligence, intuition, loving kindness.) Use coping skills for symptoms-grounding, dbt    Marycruz Degroot, [facility]  6/5/2024    _________________________________________________________________                                            Individual Treatment Plan    Patient's Name: Cindy Contreras  YOB: 1995    Date of Creation: 1/2/24  Date Treatment Plan Last Reviewed/Revised:     DSM5 Diagnoses: 296.89 Bipolar II Disorder With seasonal pattern  Psychosocial / Contextual Factors:   PROMIS (reviewed every 90 days): 24 (10/25/23)    Referral / Collaboration:  Referral to another professional/service is not indicated at this time..    Anticipated number of session for this episode of care: 6-9 sessions  Anticipation frequency of session: Every other week  Anticipated Duration of each session: 53 or more minutes  Treatment plan will be reviewed in 90 days or when goals have been changed.     MeasurableTreatment Goal(s) related to diagnosis / functional impairment(s)  Goal 1: Patient will learn to reduce rumination, practice thought stopping to improve emotion regulation to improve focus and healthy behaviors relationships    I will know I've met my goal when tbc.      Objective #A (Patient Action)    Patient will  Use created safety plan when sx present, share with others .  Status: New - Date: 1/2/24      Intervention(s)  Therapist will teach  safety plan skills .    Objective #B  Patient will  identify values and put them into practice for daily living/decision-making .  Status:  New - Date: 1/2/24  Intervention(s)  Therapist will assign homework of values practice and process/integrate  identified values into therapy goals .    Objective #C    Patient will identify and practice at  "least 5 strategies for more effectively managing Bipolar Disorder.  Patient will learn and practice grounding, DBT skills including distress tolerance, emotion regulation and mindfulness  Patient will identify and practice structure in sleep, ADLs, exercise and other wellness activities  Status: New - Date: 1/2/24      Intervention(s)  Therapist will teach skills and assign homework .    Patient has reviewed and agreed to the above plan.      Marycruz Degroot, [facility]  January 2, 2024                [facility]                                       Cindy Contreras     SAFETY PLAN:  Step 1: Warning signs / cues (Thoughts, images, mood, situation, behavior) that a crisis may be developing:  Thoughts:  I'm a failure, I'm alone, Nobody cares about me  Images: flashbacks and visions of harm: past memories of cutting  Thinking Processes: ruminations (can't stop thinking about my problems): relationships  Mood: worsening depression, hopelessness, and mood swings  Behaviors: isolating/withdrawing , can't stop crying, not taking care of myself, not taking care of my responsibilities, sleeping too much, not sleeping enough, and increasing frequency and duration of dissociation  Situations: relationship problems, financial stress, and tough situations (ie car accident)    Step 2: Coping strategies - Things I can do to take my mind off of my problems without contacting another person (relaxation technique, physical activity):  Distress Tolerance Strategies:  play with my pet , listen to positive and upbeat music: play guitar, sensory based activities/self-soothe with five senses: essential oils, watch a funny movie: podcast-H3, read a book:  , change body temperature (ice pack/cold water) , paced breathing/progressive muscle relaxation, intense exercise: gym across the street  for 2-3 minutes , and play guitar  Physical Activities: go for a walk  Focus on helpful thoughts:  \"I will get through this\", \"Ride the " "wave\", remind myself of what is important to me: dog, family, friends, and my people are here for me  Step 3: People and social settings that provide distraction:   Mom, best friend Agatha, sister Angela   coffee shop, park, library, gym , and work   Step 4: Remind myself of people and things that are important to me and worth living for:  family, friends, dog  Step 5: When I am in crisis, I can ask these people to help me use my safety plan:  Agatha, mom, stepmom,   Step 6: Making the environment safe:   be around others  Step 7: Professionals or agencies I can contact during a crisis:  Suicide Prevention Lifeline: Call or Text 988   Local Crisis Services:  [facility], Warm Line    Call 911 or go to my nearest emergency department.   I helped develop this safety plan and agree to use it when needed.  I have been given a copy of this plan.      Client signature _________________________________________________________________  Today s date:  10/31/2023  Completed by Provider Name/ Credentials:  Marycruz Degroot [facility]  October 31, 2023  Adapted from Safety Plan Template 2008 Nelsy Cervantes and Igor Glasgow is reprinted with the express permission of the authors.  No portion of the Safety Plan Template may be reproduced without the express, written permission.  You can contact the authors at [email or web address] or [email or web address].        "

## 2024-06-26 ENCOUNTER — TELEPHONE (OUTPATIENT)
Dept: PSYCHIATRY | Facility: CLINIC | Age: 29
End: 2024-06-26
Payer: COMMERCIAL

## 2024-06-26 ENCOUNTER — MYC MEDICAL ADVICE (OUTPATIENT)
Dept: PSYCHIATRY | Facility: CLINIC | Age: 29
End: 2024-06-26
Payer: COMMERCIAL

## 2024-06-26 DIAGNOSIS — F31.81 BIPOLAR 2 DISORDER (H): Primary | ICD-10-CM

## 2024-06-26 NOTE — TELEPHONE ENCOUNTER
YouFetcht message sent to patient advising not to restart full dose of Lamictal until we hear from Dr Robbins.

## 2024-06-26 NOTE — TELEPHONE ENCOUNTER
Health Call Center    Phone Message    May a detailed message be left on voicemail: yes     Reason for Call: Medication Question or concern regarding medication   Prescription Clarification  Name of Medication: lamictal  Prescribing Provider: sincere (previous minor)   Pharmacy:    [id number] - [place], [place] - [address] [place] [address] [address]     What on the order needs clarification?     Patient had trouble getting most recent lamital refill but did get it filled. She went 4 days without it, today is day 5, she wanted to check in with nursing team about if she could just start with the full regular dose again.    Noted that without the medication she's been feeling lethargic, tired and irritable.    Also just as an FYI patient has neuropsych testing scheduled with [facility] on 7/31 and will be signing an GARRICK to have records transferred to this clinic when they are ready.      Action Taken: Message routed to:  Other: nursing pool    Travel Screening: Not Applicable

## 2024-06-27 RX ORDER — LAMOTRIGINE 25 MG/1
TABLET ORAL
Qty: 200 TABLET | Refills: 1 | Status: SHIPPED | OUTPATIENT
Start: 2024-06-27 | End: 2024-09-03

## 2024-06-27 NOTE — TELEPHONE ENCOUNTER
Lissett Fong MD  You; Benito Robbins MD; Dylan Pereira, RN7 hours ago (11:52 PM)       Soradrien fontaine, I just realized I replied to the chat but not the full conversation. This is a tough situation! I would recommend she restart the titration unfortunately, so 25 mg daily x 2 weeks, then 50 mg daily x 2 weeks, then 100 mg daily x 1 week, then 200 mg daily.

## 2024-07-24 DIAGNOSIS — F31.81 BIPOLAR 2 DISORDER (H): ICD-10-CM

## 2024-07-24 RX ORDER — ARIPIPRAZOLE 5 MG/1
2.5 TABLET ORAL DAILY
Qty: 15 TABLET | Refills: 0 | Status: SHIPPED | OUTPATIENT
Start: 2024-07-24 | End: 2024-08-06

## 2024-07-24 NOTE — TELEPHONE ENCOUNTER
Last Seen 3/6  RTC 4 weeks  Cancel None  No-Show None    Next Appt 8/6    Incoming Refill From [facility] via fax    Medication Requested   ARIPiprazole (ABILIFY) 5 MG tablet     Directions   TAKE 0.5 TABLETS BY MOUTH DAILY.     Qty 15    Last Refill 6/27      Medication pended and routed to provider for approval.

## 2024-08-05 ENCOUNTER — TELEPHONE (OUTPATIENT)
Dept: PSYCHIATRY | Facility: CLINIC | Age: 29
End: 2024-08-05
Payer: COMMERCIAL

## 2024-08-05 NOTE — PROGRESS NOTES
[facility]  Brief Care Team  TRANSFER of CARE DIAGNOSTIC ASSESSMENT       CARE TEAM:    PCP- Siobhan Covington  Therapist- Marycruz Degroot at [facility]     Gerson is a 28 year old who uses the pronouns she, her, hers, they, them, theirs.                   Chief Concern    Anxiety                 Assessment & Plan     Bipolar affective disorder, type II  Borderline personality disorder    Cindy Contreras is a 28 year old with a past psychiatric history of bipolar 2 disorder, BPD and generalized anxiety disorder who was seen for follow up in the outpatient psychiatry clinic.     Today, Gerson reports that she has been discussing the diagnosis of borderline personality disorder with her therapist and thinks this aligns with many of her symptoms at this time, including unable to regulate emotions, triggered by social situations, very reactive, overwhelmed with emotions and body goes into flight or fight. She will be getting results of neuropsych testing that was done and in anticipation for testing had discontinued medication lamictal, as she also did not find this to be beneficial. She noted her priority at this time would be to optimize functioning for her job, which will include public speaking over the next month especially. Discussed addition of propranolol 10mg up to twice daily as needed for public speaking/panic, as she does also not episodes of anxiety. Denies any recent manic symptoms, AH/VH (none since starting abilify), current SI, or recent SI with plan/intent. She also expressed interest in participating in DBT- SW to reach out to her with resources. Of note, she is no longer planning on moving out of state so may be better suited transferring off Brief Care Team to [facility].     Psychotropic Drug Interactions:    Blood pressure lowering agents: abilify + propranolol  Seizure-threshold lowering potential: abilify + phentermine  CNS depressant:  abilify +topiramate  Management: routine monitoring    [place]MP was not checked today: not using controlled substances    Risk Statements:   Treatment Risk: Risks, benefits, alternatives and potential adverse effects have been discussed and are understood.   Safety Risk: Gerson did not appear to be an imminent safety risk to self or others.    Future Considerations:  If lamotrigine effective for mood regulation, may consider discontinuing Abilify  DBT      PLAN    1) Medications:   - start propranolol 10mg up to twice daily as needed for public speaking/panic   - Continue Abilify 2.5mg daily    2) Psychotherapy: continue with individual therapist     3) Next due:  Labs- SGA monitoring labs ordered- will complete after visit  EKG- N/A   Rating scales- AIMS: Determine next due    4) Referrals: DBT (not in [facility])     5) Follow-up: Return to clinic in 4 weeks                    Pertinent Background      First experienced depression and anxiety as a teenager. During her early 20's began to experience periods of increased energy with heightened goal-oriented behavior and increased impulsivity associated with episodes of hypomania. These have been relatively well-controlled with Lexapro and Abilify after an initial trial of sertraline triggered an episode of hypomania. Does have a history of chronic SI and prior SIB with a recent recurrence of cutting in 2024. Symptoms of dysregulation, SI, SIB, rejection sensitivity, and inner feeling of emptiness consistent with diagnosis of borderline personality disorder.     Pertinent items include:  SIB, passive SI                 History of Present Illness     - last few months not great  - feels like she was originally misdiagnosed, was first prescribed zoloft, within 4 days of starting it mixed episode, but also felt abandoned, rejected  - been working with therapist, thinks might be BPD, has had a hunch   - unable to regulate emotions, triggered really easily by social  "situations, very reactive, overwhelmed with emotions, when gets triggered it's overhwlming body goes into fight or flight   - last had SI a week ago, no plan or intent, also had hx of self-harm, last in January.   - also just had neuropsych testing, going over results upcoming   - the past few months has had a lot of panic attacks at least once a week mild- had for awhile but getting worse, racing thoughts turn into things she doesn't understand, can't understand thoughts, brain running fast but body slow   - Elevated mood, restless, on edge, goal-oriented towards work, needs something with an outcome, reckless spending/driving- last had this a couple months, January   - sleep recently has been okay, tends to not sleep at all (brain too active) or oversleep  - baseline depressed mood so hard to say, not specifically feeling like that, is on weight-loss medication, has lost about 60-65 pounds. On phentermine and topiramate --- weight loss clinic. Does feel appeite spupressant.   - in past has had auditory and visual hallucinations, paranoid delusions of people talking about her (when living with roommates), hasn't had visual hallucinations in 3 years since starting abilify, has had auditory hallucinations of noises, not frequent but in setting of hypomania or paranoia/escalated   - paranoia still \"huge\", feeling like people are going to get her fired, ruin friendships, makes scared, also knows she has social anxiety   - regarding past trauma, avoidant of triggers   - had forgotten to take lamictal and then ran out, didn't restart because had neuropsych eval, didn't feel like was doing anything maybe, maybe stopped a month ago   - forgets meds, when depressed/manic won't take meds   - kind of wants to wait until neuropsych testing results before starting anything else  - interested in some PRNs for anxiety like panic episodes  - work is hard, looking for a new job, phobia of public speaking and has to do a lot at work "     Current Social History:  Financial/occupational: Manager at [employer]   Living situation: independently in apartment   Social/spiritual support: it's fine, has good friends and close with family       Pertinent Substance Use:  Alcohol: Yes: socially    Cannabis: Yes: infrequently  Tobacco: Yes: vapes nicotine, daily -- 2,000 puffs and takes 3 or 4 days, helps with anxiety, oral fixation, fidgeting, quit for 3 months and restarted   Caffeine:  Yes: daily 2 cups of coffee   Opioids: No   Narcan Kit current: N/A  Other substances: No     Medical Review of Systems:   Had breast reduction surgery in April- no concerns, has lost weight intentionally since last year starting phentermine and topiramate.               Physical Exam  (Vitals Only)    There were no vitals taken for this visit.  Pulse Readings from Last 3 Encounters:   05/29/24 84   04/16/24 87   04/04/24 67     Wt Readings from Last 3 Encounters:   05/22/24 54.9 kg (121 lb)   04/16/24 56.9 kg (125 lb 6.4 oz)   04/04/24 59 kg (130 lb)     BP Readings from Last 3 Encounters:   05/29/24 104/74   04/16/24 114/80   04/04/24 92/59                      Mental Status Exam    Alertness: alert  and oriented  Appearance: casually groomed  Behavior/Demeanor: cooperative and calm, with good  eye contact   Speech: normal and regular rate and rhythm  Language: no problems  Psychomotor: normal or unremarkable  Mood: description consistent with euthymia  Affect: full range; congruent to: mood- yes, content- yes  Thought Process/Associations: unremarkable  Thought Content:  Reports none;  Denies suicidal ideation  Perception:  Reports none;  Denies hallucinations  Insight: good  Judgment: good  Cognition: does  appear grossly intact; formal cognitive testing was not done  Gait and Station: unremarkable                  Family History     Patient does report a family history of mental health concerns.  Patient reports family history includes Anxiety Disorder in her  father and sister; Depression in her mother; Diabetes in her paternal grandmother; Other Cancer in her maternal grandfather..                 Past Psychiatric History     Self injurious behavior [method, most recent]: Yes: History of cutting, most recent 1x in 2024 after none for 10 years  Suicide attempt [#, most recent, method]: Yes: As a teenager took a handful of medications, didn't require hospitalization  Suicidal ideation hx [passive, active]: Yes: Passive ideation, no history of plan or intent     Violence/Aggression Hx:No   Psychosis Hx: Non-specific experience of seeing shadowy shapes out of the corners of her eyes  Eating Disorder Hx: No  Trauma hx: Yes: isolated traumatic events, but no chronic sources of trauma     Psych Hosp [#, most recent]: No   Commitment: No   TMS/ECT: No   Outpatient Programs [Day treatment, DBT, eating disorder tx, etc]: Individual therapy only     SUBSTANCE USE HISTORY   Past Use: Yes: alcohol and cannabis; had a period of drinking to intoxication 4-5 days per week about 2 years ago, but has markedly decreased use. Never used alcohol during day hours and did not suffer any consequences or deleterious effects of use.                Past Psychotropic Medication Trials    Medication Max Dose (mg) Dates / Duration Helpful? DC Reason / Adverse Effects?   Zoloft       Triggered hypomania   Abilify 2.5mg   Y     Lexapro 5mg   Y     Lamotrigine   01/2024 - current                       Past Medical History     Neurologic Hx [head injury, seizures, etc]:     Patient Active Problem List   Diagnosis    Acne    Bipolar 2 disorder (H)    Environmental allergies    Smoker    Macromastia                     Allergies     Nuts                Medications     Current Outpatient Medications   Medication Sig Dispense Refill    ARIPiprazole (ABILIFY) 5 MG tablet Take 0.5 tablets (2.5 mg) by mouth daily 15 tablet 0    Cholecalciferol (VITAMIN D-3) 125 MCG (5000 UT) TABS Take 1 capsule by mouth daily  30 tablet 3    lactobacillus rhamnosus, GG, (CULTURELL) capsule Take 1 capsule by mouth 2 times daily      lamoTRIgine (LAMICTAL) 200 MG tablet Take 1 tablet (200 mg) by mouth daily 30 tablet 0    lamoTRIgine (LAMICTAL) 25 MG tablet 25 mg for 2 weeks, then increase it to 50 mg for 2 weeks, then increase it to 100 mg for 2 weeks, 150 mg for another 2 weeks and then back to 200 mg. 200 tablet 1    lamoTRIgine (LAMICTAL) 25 MG tablet Take 1 tablet (25 mg) by mouth daily for 14 days, THEN 2 tablets (50 mg) daily for 14 days. 42 tablet 0    loratadine (CLARITIN) 10 MG tablet Take 10 mg by mouth daily as needed for allergies      multivitamin w/minerals (MULTI-VITAMIN) tablet Take 1 tablet by mouth daily      norgestimate-ethinyl estradiol (ORTHO-CYCLEN) 0.25-35 MG-MCG tablet Take 1 tablet by mouth daily 84 tablet 3    ondansetron (ZOFRAN ODT) 4 MG ODT tab Take 1 tablet (4 mg) by mouth every 8 hours as needed for nausea (Patient not taking: Reported on 4/16/2024) 4 tablet 0    oxyCODONE (ROXICODONE) 5 MG tablet Take 1-2 tablets (5-10 mg) by mouth every 6 hours as needed for moderate to severe pain (Patient not taking: Reported on 4/16/2024) 20 tablet 0    phentermine 15 MG capsule Take 1 capsule (15 mg) by mouth every morning 30 capsule 3    senna-docusate (SENOKOT-S/PERICOLACE) 8.6-50 MG tablet Take 1-2 tablets by mouth 2 times daily (Patient not taking: Reported on 4/16/2024) 30 tablet 0    spironolactone (ALDACTONE) 100 MG tablet Take 1 tablet (100 mg) by mouth daily 90 tablet 3    topiramate (TOPAMAX) 25 MG tablet Take  75mg at bedtime 90 tablet 3                     Data         2/13/2024     8:01 AM 5/22/2024     8:21 AM 6/5/2024     3:52 PM   PROMIS-10 Total Score w/o Sub Scores   PROMIS TOTAL - SUBSCORES 25 26 22         10/24/2023    10:54 AM   CAGE-AID Total Score   Total Score 0   Total Score MyChart 0 (A total score of 2 or greater is considered clinically significant)         4/11/2024     2:59 PM 6/5/2024      3:51 PM 8/6/2024     8:21 AM   PHQ-9 SCORE   PHQ-9 Total Score MyChart 7 (Mild depression) 9 (Mild depression) 18 (Moderately severe depression)   PHQ-9 Total Score 7 9 18         1/24/2024     9:41 AM 2/13/2024     8:00 AM 6/5/2024     3:51 PM   BLANCA-7 SCORE   Total Score 14 (moderate anxiety) 20 (severe anxiety) 20 (severe anxiety)   Total Score 14    14 20 20       Liver/Kidney Function, TSH Metabolic Blood counts   Recent Labs   Lab Test 03/12/24  1630 06/20/23  0744   AST  --  24   ALT  --  30   ALKPHOS  --  88   CR 0.74 0.70     Recent Labs   Lab Test 03/06/23  0855   TSH 1.38    Recent Labs   Lab Test 09/14/23  0946   CHOL 170   TRIG 159*   LDL 95   HDL 43*     Recent Labs   Lab Test 03/06/23  0855   A1C 5.6     Recent Labs   Lab Test 03/12/24  1630   GLC 84    Recent Labs   Lab Test 03/12/24  1630 09/14/23  0946 03/06/23  0855   WBC  --   --  8.3   HGB 13.4   < > 14.0   HCT  --   --  42.4   MCV  --   --  90   PLT  --   --  263    < > = values in this interval not displayed.               PROVIDER: Yari Dangelo MD    Patient staffed in clinic with Dr. Serrano who will sign the note.  Supervisor is Dr. Wilhelm.

## 2024-08-05 NOTE — TELEPHONE ENCOUNTER
Yari Dangelo MD  P Psychiatry Nurse-[facility]  Hello!    I was just pre-charting for Gerson and noticed she is due for SGA monitoring labs. Since her appointment is right away in the morning in-person, I was thinking she could arrive fasting and get her labs right after her appointment. Would someone be able to leave her a message asking her if she could arrive fasted tomorrow in anticipation for labs?    Thanks!    Follow Up:  Writer attempted to call patient to discuss the above. Left discrete message requesting the above and left main clinic number for call back if questions or concerns.

## 2024-08-06 ENCOUNTER — PATIENT OUTREACH (OUTPATIENT)
Dept: CARE COORDINATION | Facility: CLINIC | Age: 29
End: 2024-08-06
Payer: COMMERCIAL

## 2024-08-06 ENCOUNTER — OFFICE VISIT (OUTPATIENT)
Dept: PSYCHIATRY | Facility: CLINIC | Age: 29
End: 2024-08-06
Attending: PSYCHIATRY & NEUROLOGY
Payer: COMMERCIAL

## 2024-08-06 ENCOUNTER — LAB (OUTPATIENT)
Dept: LAB | Facility: CLINIC | Age: 29
End: 2024-08-06
Attending: PSYCHIATRY & NEUROLOGY
Payer: COMMERCIAL

## 2024-08-06 VITALS
BODY MASS INDEX: 22.33 KG/M2 | SYSTOLIC BLOOD PRESSURE: 107 MMHG | DIASTOLIC BLOOD PRESSURE: 75 MMHG | WEIGHT: 118.2 LBS | HEART RATE: 65 BPM

## 2024-08-06 DIAGNOSIS — F31.81 BIPOLAR 2 DISORDER (H): Primary | ICD-10-CM

## 2024-08-06 DIAGNOSIS — F31.81 BIPOLAR 2 DISORDER (H): ICD-10-CM

## 2024-08-06 LAB
ALBUMIN SERPL BCG-MCNC: 4.2 G/DL (ref 3.5–5.2)
ALP SERPL-CCNC: 50 U/L (ref 40–150)
ALT SERPL W P-5'-P-CCNC: 22 U/L (ref 0–70)
ANION GAP SERPL CALCULATED.3IONS-SCNC: 9 MMOL/L (ref 7–15)
AST SERPL W P-5'-P-CCNC: 18 U/L (ref 0–45)
BILIRUB SERPL-MCNC: 0.5 MG/DL
BUN SERPL-MCNC: 7.4 MG/DL (ref 6–20)
CALCIUM SERPL-MCNC: 8.9 MG/DL (ref 8.8–10.4)
CHLORIDE SERPL-SCNC: 105 MMOL/L (ref 98–107)
CHOLEST SERPL-MCNC: 216 MG/DL
CREAT SERPL-MCNC: 0.8 MG/DL (ref 0.51–1.17)
EGFRCR SERPLBLD CKD-EPI 2021: >90 ML/MIN/1.73M2
ERYTHROCYTE [DISTWIDTH] IN BLOOD BY AUTOMATED COUNT: 12.3 % (ref 10–15)
FASTING STATUS PATIENT QL REPORTED: YES
FASTING STATUS PATIENT QL REPORTED: YES
GLUCOSE SERPL-MCNC: 88 MG/DL (ref 70–99)
HBA1C MFR BLD: 5.8 %
HCO3 SERPL-SCNC: 27 MMOL/L (ref 22–29)
HCT VFR BLD AUTO: 41 % (ref 35–53)
HDLC SERPL-MCNC: 63 MG/DL
HGB BLD-MCNC: 13.7 G/DL (ref 13.3–17.7)
LDLC SERPL CALC-MCNC: 130 MG/DL
MCH RBC QN AUTO: 29.8 PG (ref 26.5–33)
MCHC RBC AUTO-ENTMCNC: 33.4 G/DL (ref 31.5–36.5)
MCV RBC AUTO: 89 FL (ref 78–100)
NONHDLC SERPL-MCNC: 153 MG/DL
PLATELET # BLD AUTO: 266 10E3/UL (ref 150–450)
POTASSIUM SERPL-SCNC: 4 MMOL/L (ref 3.4–5.3)
PROT SERPL-MCNC: 6.7 G/DL (ref 6.4–8.3)
RBC # BLD AUTO: 4.6 10E6/UL (ref 3.8–5.9)
SODIUM SERPL-SCNC: 141 MMOL/L (ref 135–145)
TRIGL SERPL-MCNC: 114 MG/DL
WBC # BLD AUTO: 7.3 10E3/UL (ref 4–11)

## 2024-08-06 PROCEDURE — 36415 COLL VENOUS BLD VENIPUNCTURE: CPT

## 2024-08-06 PROCEDURE — 80061 LIPID PANEL: CPT

## 2024-08-06 PROCEDURE — 80053 COMPREHEN METABOLIC PANEL: CPT

## 2024-08-06 PROCEDURE — 90792 PSYCH DIAG EVAL W/MED SRVCS: CPT | Mod: GC

## 2024-08-06 PROCEDURE — 85027 COMPLETE CBC AUTOMATED: CPT

## 2024-08-06 PROCEDURE — 83036 HEMOGLOBIN GLYCOSYLATED A1C: CPT

## 2024-08-06 RX ORDER — PROPRANOLOL HYDROCHLORIDE 10 MG/1
10 TABLET ORAL 2 TIMES DAILY PRN
Qty: 60 TABLET | Refills: 1 | Status: SHIPPED | OUTPATIENT
Start: 2024-08-06

## 2024-08-06 RX ORDER — ARIPIPRAZOLE 5 MG/1
2.5 TABLET ORAL DAILY
Qty: 15 TABLET | Refills: 0 | Status: SHIPPED | OUTPATIENT
Start: 2024-08-06 | End: 2024-08-06

## 2024-08-06 RX ORDER — ARIPIPRAZOLE 5 MG/1
2.5 TABLET ORAL DAILY
Qty: 15 TABLET | Refills: 1 | Status: SHIPPED | OUTPATIENT
Start: 2024-08-06 | End: 2024-09-03

## 2024-08-06 RX ORDER — PROPRANOLOL HYDROCHLORIDE 10 MG/1
10 TABLET ORAL 2 TIMES DAILY PRN
Qty: 60 TABLET | Refills: 0 | Status: SHIPPED | OUTPATIENT
Start: 2024-08-06 | End: 2024-08-06

## 2024-08-06 ASSESSMENT — PATIENT HEALTH QUESTIONNAIRE - PHQ9
SUM OF ALL RESPONSES TO PHQ QUESTIONS 1-9: 18
SUM OF ALL RESPONSES TO PHQ QUESTIONS 1-9: 18
10. IF YOU CHECKED OFF ANY PROBLEMS, HOW DIFFICULT HAVE THESE PROBLEMS MADE IT FOR YOU TO DO YOUR WORK, TAKE CARE OF THINGS AT HOME, OR GET ALONG WITH OTHER PEOPLE: SOMEWHAT DIFFICULT

## 2024-08-06 ASSESSMENT — PAIN SCALES - GENERAL: PAINLEVEL: NO PAIN (0)

## 2024-08-06 NOTE — Clinical Note
Franklin Tobar!  Gerson was interested in DBT program referral- since we don't have availability here at [place], should I still put in an adult mental health  referral or

## 2024-08-06 NOTE — PATIENT INSTRUCTIONS
Treatment plan:    Medications:  - start propranolol 10mg up to twice daily as needed prior to public speaking (take 30-60 minutes before) or for panic  - continue abilify 2.5mg daily     Therapy:   - continue with individual therapist  - our  will send you  DBT program referral information       **For crisis resources, please see the information at the end of this document**   Patient Education    Thank you for coming to the [facility] [place] [facility].     Lab Testing:  If you had lab testing today and your results are reassuring or normal they will be mailed to you or sent through [employer] within 7 days. If the lab tests need quick action we will call you with the results. The phone number we will call with results is # [phone]. If this is not the best number please call our clinic and change the number.     Medication Refills:  If you need any refills please call your pharmacy and they will contact us. Our fax number for refills is [phone].   Three business days of notice are needed for general medication refill requests.   Five business days of notice are needed for controlled substance refill requests.   If you need to change to a different pharmacy, please contact the new pharmacy directly. The new pharmacy will help you get your medications transferred.     Contact Us:  Please call [phone] during business hours (8-5:00 M-F).   If you have medication related questions after clinic hours, or on the weekend, please call [phone].     Financial Assistance [phone]   Medical Records [phone]       MENTAL HEALTH CRISIS RESOURCES:  For a emergency help, please call 911 or go to the nearest Emergency Department.     Emergency Walk-In Options:   [facility] @ [facility] ([place]): [phone] - Specialized mental health emergency area designed to be calming  [facility] ([place]): [phone]  [facility] [place] Acute  Psychiatry Services ([place]): [phone]  [facility] ([place]): [phone]    [facility] Crisis Information:   Jenifer: [phone]  Roger: [phone]  Deon (DENITA) - Adult: [phone]     Child: [phone]  Leonid - Adult: [phone]     Child: [phone]  [place]: [phone]  List of all [facility] resources:   https://[email or web address]/dhs/people-we-serve/adults/health-care/mental-health/resources/crisis-contacts.jsp    National Crisis Information:   Crisis Text Line: Text  [place]  to 521455  Suicide & Crisis Lifeline: 988  National Suicide Prevention Lifeline: 3-014-761-TALK (1-340.287.4990)       For online chat options, visit https://suicidepreventionlifeline.org/chat/  Poison Control Center: 1-936.884.4701  Trans Lifeline: 1-652.190.3069 - Hotline for transgender people of all ages  [employer] [place] [employer]: 2-308-528-9438 - Hotline for LGBT youth     For Non-Emergency Support:   Fast Tracker: Mental Health & Substance Use Disorder Resources -   https://[email or web address].[email or web address]/

## 2024-08-06 NOTE — PROGRESS NOTES
Clinic Care Coordination Contact  [facility]/Voicemail    Clinical Data: Care Coordinator Outreach    Outreach Documentation Number of Outreach Attempt   8/6/2024  11:52 AM 1     Left message on patient's voicemail with call back information and requested return call.    Plan: Care Coordinator will try to reach patient again in 1-2 business days.    Ekaterina Benavides SYDNI  Clinic Care Coordination  [facility]  [email or web address]  [phone]

## 2024-08-08 NOTE — PROGRESS NOTES
Clinic Care Coordination Contact  Clinic Care Coordination Contact  OUTREACH    Referral Information: Dr. Dangelo     Chief Complaint   Patient presents with    Clinic Care Coordination - Initial      Universal Utilization:    Utilization      No Show Count (past year)  1             ED Visits  0             Hospital Admissions  0                    Current as of: 8/6/2024  7:06 PM              Clinical Concerns:  Current Medical Concerns: Did not discuss.    Current Behavioral Concerns: Psychiatric contacted patient to follow up on referral placed by Dr. Dangelo. Patient reports that she is looking for DBT resources. 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 She requested that these resources be sent to her via [employer].     Education Provided to patient: Patient was provided with DBT resources.      Medication Management:  Medication review status: Did not discuss.    Lifestyle & Psychosocial Needs:    Social Determinants of Health     Food Insecurity: Low Risk  (12/20/2023)    Food Insecurity     Within the past 12 months, did you worry that your food would run out before you got money to buy more?: No     Within the past 12 months, did the food you bought just not last and you didn t have money to get more?: No   Depression: At risk (8/6/2024)    PHQ-2     PHQ-2 Score: 3   Housing Stability: Low Risk  (12/20/2023)    Housing Stability     Do you have housing? : Yes     Are you worried about losing your housing?: No   Tobacco Use: High Risk (5/29/2024)    Patient History     Smoking Tobacco Use: Every Day     Smokeless Tobacco Use: Former     Passive Exposure: Current   Financial Resource Strain: Low Risk  (12/20/2023)    Financial Resource Strain     Within the past 12 months, have you or your family members you live with been unable to get utilities (heat, electricity) when it was really needed?: No   Alcohol Use:  Not on file   Transportation Needs: Low Risk  (12/20/2023)    Transportation Needs     Within the past 12 months, has lack of transportation kept you from medical appointments, getting your medicines, non-medical meetings or appointments, work, or from getting things that you need?: No   Physical Activity: Not on File (8/25/2019)    Received from [facility]    Physical Activity     Physical Activity: 0   Interpersonal Safety: Low Risk  (3/12/2024)    Interpersonal Safety     Do you feel physically and emotionally safe where you currently live?: Yes     Within the past 12 months, have you been hit, slapped, kicked or otherwise physically hurt by someone?: No     Within the past 12 months, have you been humiliated or emotionally abused in other ways by your partner or ex-partner?: No   Stress: Not on File (8/25/2019)    Received from [facility]    Stress     Stress: 0   Social Connections: Unknown (1/1/2022)    [facility]    Social Connections     Frequency of Communication with Friends and Family: Not on file   Health Literacy: Not on file     Patient/Caregiver understanding: Patient verbalized understanding, engaged in AIDET communication during patient encounter.     Future Appointments                In 3 weeks Yari Dangelo MD [facility], [facility] [[email or web address]] [facility]          Plan: Patient was encouraged to reach out with any questions or concerns.    Care Coordinator will do no further outreaches at this time.    Ekaterina Benavides, [facility]  Clinic Care Coordination  [facility]  [email or web address]  [phone]

## 2024-08-15 ENCOUNTER — PATIENT OUTREACH (OUTPATIENT)
Dept: CARE COORDINATION | Facility: CLINIC | Age: 29
End: 2024-08-15
Payer: COMMERCIAL

## 2024-08-29 ENCOUNTER — PATIENT OUTREACH (OUTPATIENT)
Dept: CARE COORDINATION | Facility: CLINIC | Age: 29
End: 2024-08-29
Payer: COMMERCIAL
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
## 2024-09-03 ENCOUNTER — VIRTUAL VISIT (OUTPATIENT)
Dept: PSYCHIATRY | Facility: CLINIC | Age: 29
End: 2024-09-03
Attending: PSYCHIATRY & NEUROLOGY
Payer: COMMERCIAL

## 2024-09-03 VITALS — HEIGHT: 61 IN | BODY MASS INDEX: 22.09 KG/M2 | WEIGHT: 117 LBS

## 2024-09-03 DIAGNOSIS — F60.3 BORDERLINE PERSONALITY DISORDER (H): ICD-10-CM

## 2024-09-03 DIAGNOSIS — F31.81 BIPOLAR 2 DISORDER (H): Primary | ICD-10-CM

## 2024-09-03 PROCEDURE — G2211 COMPLEX E/M VISIT ADD ON: HCPCS | Mod: 95

## 2024-09-03 PROCEDURE — 99214 OFFICE O/P EST MOD 30 MIN: CPT | Mod: 95

## 2024-09-03 RX ORDER — ARIPIPRAZOLE 5 MG/1
5 TABLET ORAL DAILY
Qty: 30 TABLET | Refills: 1 | Status: SHIPPED | OUTPATIENT
Start: 2024-09-03

## 2024-09-03 ASSESSMENT — PAIN SCALES - GENERAL: PAINLEVEL: SEVERE PAIN (6)

## 2024-09-03 ASSESSMENT — PATIENT HEALTH QUESTIONNAIRE - PHQ9: SUM OF ALL RESPONSES TO PHQ QUESTIONS 1-9: 22

## 2024-09-03 NOTE — PATIENT INSTRUCTIONS
Treatment plan:    Medications:   -Increase Abilify to 5 mg daily  -Continue propranolol 10 mg up to twice daily as needed before public speaking    Therapy:  Continue with individual therapist    **For crisis resources, please see the information at the end of this document**   Patient Education    Thank you for coming to the [facility] [place] [facility].     Lab Testing:  If you had lab testing today and your results are reassuring or normal they will be mailed to you or sent through [employer] within 7 days. If the lab tests need quick action we will call you with the results. The phone number we will call with results is # [phone]. If this is not the best number please call our clinic and change the number.     Medication Refills:  If you need any refills please call your pharmacy and they will contact us. Our fax number for refills is [phone].   Three business days of notice are needed for general medication refill requests.   Five business days of notice are needed for controlled substance refill requests.   If you need to change to a different pharmacy, please contact the new pharmacy directly. The new pharmacy will help you get your medications transferred.     Contact Us:  Please call [phone] during business hours (8-5:00 M-F).   If you have medication related questions after clinic hours, or on the weekend, please call [phone].     Financial Assistance [phone]   Medical Records [phone]       MENTAL HEALTH CRISIS RESOURCES:  For a emergency help, please call 911 or go to the nearest Emergency Department.     Emergency Walk-In Options:   EmPATH Unit @ [facility] ([place]): [phone] - Specialized mental health emergency area designed to be calming  [facility] ([place]): [phone]  [facility] ([place]): [phone]  [facility] ([place]): [phone]    [facility] [place], [place]  Information:   [place]: [phone]  [place]: [phone]  [place] ([place]) - Adult: [phone]     Child: [phone]  [place] - Adult: [phone]     Child: [phone]  [place]: [phone]  List of all [facility] resources:   https://[place].gov/dhs/people-we-serve/adults/health-care/mental-health/resources/crisis-contacts.jsp    National Crisis Information:   Crisis Text Line: Text  [place]  to 359054  Suicide & Crisis Lifeline: 988  National Suicide Prevention Lifeline: 1-982-508-TALK (1-147.207.1820)       For online chat options, visit https://suicidepreventionlifeline.org/chat/  Poison Control Center: 1-653.464.7250  Trans Lifeline: 1-152.850.7121 - Hotline for transgender people of all ages  The [place] [employer]: 5-216-812-8612 - Hotline for LGBT youth     For Non-Emergency Support:   Fast Tracker: Mental Health & Substance Use Disorder Resources -   https://www.[facility]track[facility].org/

## 2024-09-03 NOTE — Clinical Note
Hi!   Just noticed Gerson hasn't made a follow-up appt yet and wanted to make sure I get her on my schedule since we made med change this visit.   Thanks!

## 2024-09-03 NOTE — PROGRESS NOTES
Virtual Visit Details    Type of service:  Video Visit   Start: 1000  End: 1038    Originating Location (pt. Location): Home  {PROVIDER LOCATION On-site should be selected for visits conducted from your clinic location or adjoining [facility] hospital, academic office, or other nearby [facility] building. Off-site should be selected for all other provider locations, including home:[id number]}  Distant Location (provider location):  On-site  Platform used for Video Visit: [employer]

## 2024-09-03 NOTE — NURSING NOTE
Is the patient currently in the state of [place]? YES    Current patient location: [address] [place] [address]    Visit mode:VIDEO    If the visit is dropped, the patient can be reconnected by: VIDEO VISIT: Text to cell phone:   Telephone Information:   Mobile [phone]       Will anyone else be joining the visit? No  (If patient encounters technical issues they should call [phone])    How would you like to obtain your AVS? MyChart    Are changes needed to the allergy or medication list? No    Are refills needed on medications prescribed by this physician? NO      Reason for visit: KIMBERLY Sol

## 2024-09-03 NOTE — Clinical Note
Hello!   I was wondering if someone could follow-up with Gerson about getting started with DBT. She said she is very interested in it and received the resources but is just having difficulty with motivation and taking the next step in the process.  Thank you!

## 2024-09-09 ENCOUNTER — PATIENT OUTREACH (OUTPATIENT)
Dept: CARE COORDINATION | Facility: CLINIC | Age: 29
End: 2024-09-09
Payer: COMMERCIAL

## 2024-09-09 NOTE — LETTER
[facility] CARE COORDINATION  [facility]  September 12, 2024    Cindy Contreras  [address]  [place] [address]      Dear Gerson,    I am a clinic care coordinator who works with Yari Dangelo MD with the [facility]. I wanted to introduce myself and provide you with my contact information for you to be able to call me with any questions or concerns. I have been trying to reach you recently to introduce Clinic Care Coordination. Below is a description of clinic care coordination and how I can further assist you.       The clinic care coordination team is made up of a registered nurse and  who understand the health care system. The goal of clinic care coordination is to help you manage your health and improve access to the health care system. Our team works alongside your provider to assist you in determining your health and social needs. We can help you obtain health care and community resources, providing you with necessary information and education. We can work with you through any barriers and develop a care plan that helps coordinate and strengthen the communication between you and your care team.  Our services are voluntary and are offered without charge to you personally.    Please feel free to contact me with any questions or concerns regarding care coordination and what we can offer.      We are focused on providing you with the highest-quality healthcare experience possible.    Sincerely,     ASHUTOSH Allan  Clinic Care Coordination  [facility]  [email or web address]  [phone]

## 2024-09-09 NOTE — PROGRESS NOTES
Clinic Care Coordination Contact  [facility]/Voicemail    Clinical Data: Care Coordinator Outreach    Outreach Documentation Number of Outreach Attempt   9/9/2024  11:44 AM 1     Left message on patient's voicemail with call back information and requested return call.    Plan: Care Coordinator will try to reach patient again in 1-2 business days.    Ekaterina Benavides SYDNI  Clinic Care Coordination  [facility]  [email or web address]  [phone]

## 2024-09-12 NOTE — PROGRESS NOTES
Clinic Care Coordination Contact  [facility]/Voicemail    Clinical Data: Care Coordinator Outreach    Outreach Documentation Number of Outreach Attempt   9/9/2024  11:44 AM 1   9/12/2024   9:29 AM 2     Left message on patient's voicemail with call back information and requested return call.    Plan: Care Coordinator will send unable to contact letter with care coordinator contact information via [employer]. Care Coordinator will try to reach patient again in 3-5 business days.    ASHUTOSH Allan  Clinic Care Coordination  [facility]  [email or web address]  [phone]

## 2024-09-16 ENCOUNTER — MYC MEDICAL ADVICE (OUTPATIENT)
Dept: PSYCHIATRY | Facility: CLINIC | Age: 29
End: 2024-09-16
Payer: COMMERCIAL

## 2024-09-16 DIAGNOSIS — F31.81 BIPOLAR 2 DISORDER (H): Primary | ICD-10-CM

## 2024-09-18 NOTE — PROGRESS NOTES
Clinic Care Coordination Contact  Follow Up Progress Note      Assessment: [facility] contacted patient to check in. She reports that she is doing fairly well. She contacted the DBT resources that were sent to her, and has an intake scheduled with [facility] for next Wednesday. She denies having further social service needs at this time.    Care Gaps:    Health Maintenance Due   Topic Date Due    NICOTINE/TOBACCO CESSATION COUNSELING Q 1 YR  Never done    HPV IMMUNIZATION (3 - 3-dose series) 09/12/2023    INFLUENZA VACCINE (1) 09/01/2024    COVID-19 Vaccine (4 - 2024-25 season) 09/01/2024    YEARLY PREVENTIVE VISIT  09/14/2024     Intervention/Education provided during outreach: Patient verbalized understanding, engaged in [employer]IDET communication during patient encounter.     Plan: Patient was encouraged to reach out with any questions or concerns.    Care Coordinator will do no further outreaches at this time.    Ekaterina Benavides, hospitals  Clinic Care Coordination  [facility]  [email or web address]  [phone]

## 2024-09-21 DIAGNOSIS — Z30.09 BIRTH CONTROL COUNSELING: ICD-10-CM

## 2024-09-23 RX ORDER — NORGESTIMATE AND ETHINYL ESTRADIOL 0.25-0.035
1 KIT ORAL DAILY
Qty: 84 TABLET | Refills: 1 | Status: SHIPPED | OUTPATIENT
Start: 2024-09-23

## 2024-09-24 ENCOUNTER — VIRTUAL VISIT (OUTPATIENT)
Dept: PSYCHOLOGY | Facility: CLINIC | Age: 29
End: 2024-09-24
Payer: COMMERCIAL

## 2024-09-24 ENCOUNTER — TELEPHONE (OUTPATIENT)
Dept: PHARMACY | Facility: CLINIC | Age: 29
End: 2024-09-24
Payer: COMMERCIAL

## 2024-09-24 DIAGNOSIS — F31.81 BIPOLAR 2 DISORDER (H): Primary | ICD-10-CM

## 2024-09-24 PROCEDURE — 90837 PSYTX W PT 60 MINUTES: CPT | Mod: 95

## 2024-09-24 NOTE — TELEPHONE ENCOUNTER
Patient scheduled for MTM video appointment today.  Patient did not answer x2 attempts. Left voicemail encouraging patient to reschedule..    Vaishali Gong, PharmD, BCPP  Medication Therapy Management Pharmacist  [facility]iat[facility]
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
## 2024-09-26 ENCOUNTER — TELEPHONE (OUTPATIENT)
Dept: FAMILY MEDICINE | Facility: CLINIC | Age: 29
End: 2024-09-26
Payer: COMMERCIAL

## 2024-09-26 NOTE — TELEPHONE ENCOUNTER
MTM referral from: [facility] visit (referral by provider)    MTM referral outreach attempt #2 on September 26, 2024 at 9:25 AM - pt NS 9/24 appt with Vaishali Gong      Outcome: Spoke with patient will call back due to driving     Use vbc for the carrier/Plan on the flowsheet      Caitie Magaña CMA  MTM

## 2024-10-07 ENCOUNTER — VIRTUAL VISIT (OUTPATIENT)
Dept: FAMILY MEDICINE | Facility: CLINIC | Age: 29
End: 2024-10-07
Payer: COMMERCIAL

## 2024-10-07 DIAGNOSIS — M25.511 CHRONIC PAIN OF BOTH SHOULDERS: ICD-10-CM

## 2024-10-07 DIAGNOSIS — M25.512 CHRONIC PAIN OF BOTH SHOULDERS: ICD-10-CM

## 2024-10-07 DIAGNOSIS — M54.2 NECK PAIN: ICD-10-CM

## 2024-10-07 DIAGNOSIS — M54.6 CHRONIC BILATERAL THORACIC BACK PAIN: Primary | ICD-10-CM

## 2024-10-07 DIAGNOSIS — G89.29 CHRONIC BILATERAL THORACIC BACK PAIN: Primary | ICD-10-CM

## 2024-10-07 DIAGNOSIS — G89.29 CHRONIC PAIN OF BOTH SHOULDERS: ICD-10-CM

## 2024-10-07 PROCEDURE — 99214 OFFICE O/P EST MOD 30 MIN: CPT | Mod: 95 | Performed by: NURSE PRACTITIONER

## 2024-10-07 RX ORDER — CYCLOBENZAPRINE HCL 5 MG
5 TABLET ORAL 3 TIMES DAILY PRN
Qty: 30 TABLET | Refills: 0 | Status: SHIPPED | OUTPATIENT
Start: 2024-10-07

## 2024-10-07 ASSESSMENT — PATIENT HEALTH QUESTIONNAIRE - PHQ9
SUM OF ALL RESPONSES TO PHQ QUESTIONS 1-9: 10
SUM OF ALL RESPONSES TO PHQ QUESTIONS 1-9: 10
10. IF YOU CHECKED OFF ANY PROBLEMS, HOW DIFFICULT HAVE THESE PROBLEMS MADE IT FOR YOU TO DO YOUR WORK, TAKE CARE OF THINGS AT HOME, OR GET ALONG WITH OTHER PEOPLE: NOT DIFFICULT AT ALL
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
## 2024-10-07 NOTE — PATIENT INSTRUCTIONS
Flexeril refilled. Do not drive while taking this as it can cause drowsiness and slower reaction time.     Tylenol and/or ibuprofen as needed for pain.     Ice/heat as needed for pain.     Physical therapy and spine referrals placed.     Follow-up with PCP if symptoms do not improve.

## 2024-10-08 ENCOUNTER — PATIENT OUTREACH (OUTPATIENT)
Dept: CARE COORDINATION | Facility: CLINIC | Age: 29
End: 2024-10-08
Payer: COMMERCIAL

## 2024-10-08 NOTE — PROGRESS NOTES
PHYSICAL THERAPY EVALUATION  Type of Visit: Evaluation              Subjective       Presenting condition or subjective complaint:    Date of onset: 10/07/24 (PT referral date)    Relevant medical history:     Dates & types of surgery: breast reduction    Prior diagnostic imaging/testing results: X-ray     Prior therapy history for the same diagnosis, illness or injury: Yes chiropractor and massage therapy    Living Environment  Social support: Alone   Type of home: Apartment/condo   Stairs to enter the home: Yes 5 Is there a railing: No     Ramp: No   Stairs inside the home: No       Help at home: None  Equipment owned:       Employment: Yes   Hobbies/Interests:      Patient goals for therapy:      Cindy Contreras is a 29 year old adult with a thoracic, cervical and bilateral shoulder condition. Mechanism of injury: Chronic neck, upper back and shoulder pain. Where: (home, work, MVA, community, recreation/sport, unknown, other): NA. Location of symptoms: bilateral upper back and neck, worse on right, occasionally down right shoulder and upper arm. Pain level on number scale: 6-9/10. Quality of pain: pinching. Associated symptoms: headaches, nausea. Pain frequency (constant/intermittent): intermittent. Symptoms are exacerbated by: cervical AROM, driving, computer work, reading, sitting, sleeping. Symptoms are relieved by: muscle relaxers, PT. Progression of symptoms since onset (same/better/worse): worse. Special tests (x-ray, MRI, CT scan, EMG, bone scan): x-ray. Previous treatment: chiropractor, massage. Improvement with previous treatment: yes. General health as reported by patient is good. Pertinent medical history includes:  see Epic. Medical allergies includes: see Epic. Surgical history includes: see Epic. Current medications include: see Epic. Occupation: . Patient is (working in normal job without restrictions/working in normal job with restrictions/working in an  alternate job/not working due to present treatment problem): working in normal job. Primary job tasks: computer work, standing, walking, lifting. Barriers at home/work: None reported by patient. Red flags: None reported by patient.     Objective   Posture     Sitting (good/fair/poor):  poor  Standing (good/fair/poor):  fair  Protruded head (yes/no):  no  Wry neck (right/left/nil):  nil  Relevant wry neck (yes/no):  no  Correction of posture (better/worse/no effect): better    Neurological    Myotomes L R   C4 (shoulder elevation) 5/5 5/5   C5 (shoulder abduction) 5/5 5/5   C6 (elbow flexion) 5/5 5/5   C7 (elbow extension) 5/5 5/5   C8 (thumb extension) 5/5 5/5   T1 (finger add/abd) 5/5 5/5     Sensory Deficit, Reflexes, Dural Signs: cervical dermatomes WNL    Cervical Movement Loss Herminio Mod Min Nil Pain   Protrusion    -    Flexion   x  +   Retraction   x     Extension   x     Rotation Left   x     Rotation Right   x  +   Lateral Flexion Right   x     Lateral Flexion Left   x  +     Assessment & Plan   CLINICAL IMPRESSIONS  Medical Diagnosis: Chronic bilateral thoracic back pain, Chronic pain of both shoulders, Neck pain    Treatment Diagnosis: Chronic bilateral thoracic back pain, Chronic pain of both shoulders, Neck pain   Impression/Assessment: Patient is a 29 year old adult with thoracic, cervical and bilateral shoulder complaints.  The following significant findings have been identified: Pain, Decreased ROM/flexibility, Decreased joint mobility, Decreased strength, Impaired muscle performance, Decreased activity tolerance, and Impaired posture. These impairments interfere with their ability to perform self care tasks, work tasks, recreational activities, household chores, and driving  as compared to previous level of function.     Clinical Decision Making (Complexity):  Clinical Presentation: Stable/Uncomplicated  Clinical Presentation Rationale: based on medical and personal factors listed in PT  evaluation  Clinical Decision Making (Complexity): Low complexity    PLAN OF CARE  Treatment Interventions:  Interventions: Manual Therapy, Neuromuscular Re-education, Therapeutic Activity, Therapeutic Exercise, Self-Care/Home Management    Long Term Goals     PT Goal 1  Goal Description: Patient will be able to drive with 0/10 pain.  Rationale: to maximize safety and independence with transportation  Target Date: 01/01/25      Frequency of Treatment: 1x/week  Duration of Treatment: for 4 weeks tapering down to 2x/month for 8 weeks    Recommended Referrals to Other Professionals:  None  Education Assessment:   Learner/Method: Patient;No Barriers to Learning    Risks and benefits of evaluation/treatment have been explained.   Patient/Family/caregiver agrees with Plan of Care.     Evaluation Time:     PT Eval, Low Complexity Minutes (06117): 15  Signing Clinician: Eduardo Prescott PT

## 2024-10-09 ENCOUNTER — THERAPY VISIT (OUTPATIENT)
Dept: PHYSICAL THERAPY | Facility: CLINIC | Age: 29
End: 2024-10-09
Attending: NURSE PRACTITIONER
Payer: COMMERCIAL

## 2024-10-09 DIAGNOSIS — M54.6 CHRONIC BILATERAL THORACIC BACK PAIN: ICD-10-CM

## 2024-10-09 DIAGNOSIS — M25.512 CHRONIC PAIN OF BOTH SHOULDERS: ICD-10-CM

## 2024-10-09 DIAGNOSIS — G89.29 CHRONIC PAIN OF BOTH SHOULDERS: ICD-10-CM

## 2024-10-09 DIAGNOSIS — M54.2 NECK PAIN: ICD-10-CM

## 2024-10-09 DIAGNOSIS — M25.511 CHRONIC PAIN OF BOTH SHOULDERS: ICD-10-CM

## 2024-10-09 DIAGNOSIS — G89.29 CHRONIC BILATERAL THORACIC BACK PAIN: ICD-10-CM

## 2024-10-09 PROCEDURE — 97161 PT EVAL LOW COMPLEX 20 MIN: CPT | Mod: GP | Performed by: PHYSICAL THERAPIST

## 2024-10-09 PROCEDURE — 97110 THERAPEUTIC EXERCISES: CPT | Mod: GP | Performed by: PHYSICAL THERAPIST

## 2024-10-09 PROCEDURE — 97530 THERAPEUTIC ACTIVITIES: CPT | Mod: GP | Performed by: PHYSICAL THERAPIST

## 2024-10-10 ENCOUNTER — PATIENT OUTREACH (OUTPATIENT)
Dept: CARE COORDINATION | Facility: CLINIC | Age: 29
End: 2024-10-10
Payer: COMMERCIAL

## 2024-10-15 ENCOUNTER — HOSPITAL ENCOUNTER (EMERGENCY)
Facility: HOSPITAL | Age: 29
Discharge: HOME | End: 2024-10-15
Attending: EMERGENCY MEDICINE | Admitting: EMERGENCY MEDICINE
Payer: COMMERCIAL

## 2024-10-15 VITALS
SYSTOLIC BLOOD PRESSURE: 113 MMHG | TEMPERATURE: 97.9 F | HEART RATE: 82 BPM | BODY MASS INDEX: 22.3 KG/M2 | RESPIRATION RATE: 16 BRPM | OXYGEN SATURATION: 100 % | DIASTOLIC BLOOD PRESSURE: 75 MMHG | WEIGHT: 118 LBS

## 2024-10-15 DIAGNOSIS — T78.40XA ALLERGIC REACTION, INITIAL ENCOUNTER: ICD-10-CM

## 2024-10-15 DIAGNOSIS — T78.2XXA ANAPHYLAXIS, INITIAL ENCOUNTER: ICD-10-CM

## 2024-10-15 PROCEDURE — 96375 TX/PRO/DX INJ NEW DRUG ADDON: CPT

## 2024-10-15 PROCEDURE — 250N000011 HC RX IP 250 OP 636: Performed by: EMERGENCY MEDICINE

## 2024-10-15 PROCEDURE — 99284 EMERGENCY DEPT VISIT MOD MDM: CPT | Mod: 25

## 2024-10-15 PROCEDURE — 96374 THER/PROPH/DIAG INJ IV PUSH: CPT

## 2024-10-15 RX ORDER — FAMOTIDINE 40 MG/1
40 TABLET, FILM COATED ORAL DAILY
Qty: 5 TABLET | Refills: 0 | Status: SHIPPED | OUTPATIENT
Start: 2024-10-15 | End: 2024-10-20

## 2024-10-15 RX ORDER — DIPHENHYDRAMINE HYDROCHLORIDE 50 MG/ML
25 INJECTION INTRAMUSCULAR; INTRAVENOUS ONCE
Status: COMPLETED | OUTPATIENT
Start: 2024-10-15 | End: 2024-10-15

## 2024-10-15 RX ORDER — ONDANSETRON 4 MG/1
4 TABLET, ORALLY DISINTEGRATING ORAL EVERY 6 HOURS PRN
Qty: 6 TABLET | Refills: 0 | Status: SHIPPED | OUTPATIENT
Start: 2024-10-15 | End: 2024-10-30

## 2024-10-15 RX ORDER — METHYLPREDNISOLONE SODIUM SUCCINATE 125 MG/2ML
125 INJECTION INTRAMUSCULAR; INTRAVENOUS ONCE
Status: COMPLETED | OUTPATIENT
Start: 2024-10-15 | End: 2024-10-15

## 2024-10-15 RX ORDER — ONDANSETRON 2 MG/ML
4 INJECTION INTRAMUSCULAR; INTRAVENOUS ONCE
Status: COMPLETED | OUTPATIENT
Start: 2024-10-15 | End: 2024-10-15

## 2024-10-15 RX ADMIN — DIPHENHYDRAMINE HYDROCHLORIDE 25 MG: 50 INJECTION INTRAMUSCULAR; INTRAVENOUS at 21:18

## 2024-10-15 RX ADMIN — METHYLPREDNISOLONE SODIUM SUCCINATE 125 MG: 125 INJECTION, POWDER, FOR SOLUTION INTRAMUSCULAR; INTRAVENOUS at 21:17

## 2024-10-15 RX ADMIN — ONDANSETRON 4 MG: 2 INJECTION INTRAMUSCULAR; INTRAVENOUS at 21:18

## 2024-10-15 RX ADMIN — FAMOTIDINE 40 MG: 10 INJECTION, SOLUTION INTRAVENOUS at 21:18

## 2024-10-15 ASSESSMENT — COLUMBIA-SUICIDE SEVERITY RATING SCALE - C-SSRS
2. HAVE YOU ACTUALLY HAD ANY THOUGHTS OF KILLING YOURSELF IN THE PAST MONTH?: NO
1. IN THE PAST MONTH, HAVE YOU WISHED YOU WERE DEAD OR WISHED YOU COULD GO TO SLEEP AND NOT WAKE UP?: NO
6. HAVE YOU EVER DONE ANYTHING, STARTED TO DO ANYTHING, OR PREPARED TO DO ANYTHING TO END YOUR LIFE?: NO

## 2024-10-15 ASSESSMENT — ACTIVITIES OF DAILY LIVING (ADL)
ADLS_ACUITY_SCORE: 35
ADLS_ACUITY_SCORE: 35

## 2024-10-15 NOTE — Clinical Note
Cindy Contreras was seen and treated in our emergency department on 10/15/2024.  She may return to work on 10/17/2024.       If you have any questions or concerns, please don't hesitate to call.      Ulices Harrison MD

## 2024-10-16 NOTE — ED NOTES
"Pt reports her sx of itchy throat and swelling feels \"better\" and that it is \"completely gone\" and \"back to normal\". Pt wondering about yessica Harrison updated. SEE ORDERS.  "

## 2024-10-16 NOTE — ED TRIAGE NOTES
Accidental walnut ingestion in cookie consumed about 30 minutes ago; took allegra generic. Feels throat/mouth itching and feeling of swelling and abdominal pain /nausea.     Triage Assessment (Adult)       Row Name 10/15/24 2057          Triage Assessment    Airway WDL X  tongue/throat feel swollen with itching        Respiratory WDL    Respiratory WDL WDL        Skin Circulation/Temperature WDL    Skin Circulation/Temperature WDL X  bumps on lips        Cardiac WDL    Cardiac WDL WDL        Peripheral/Neurovascular WDL    Peripheral Neurovascular WDL WDL        Cognitive/Neuro/Behavioral WDL    Cognitive/Neuro/Behavioral WDL WDL

## 2024-10-16 NOTE — ED PROVIDER NOTES
EMERGENCY DEPARTMENT ENCOUNTER      NAME: Cindy Contreras  AGE: 29 year old adult  YOB: 1995  MRN: [id number]  EVALUATION DATE & TIME: 10/15/2024  8:59 PM    PCP: Siobhan Covington    ED PROVIDER: Ulices Harrison M.D.      Chief Complaint   Patient presents with    Allergic Reaction         FINAL IMPRESSION:  1. Allergic reaction, initial encounter    2. Anaphylaxis, initial encounter          ED COURSE & MEDICAL DECISION MAKING:    Pertinent Labs & Imaging studies reviewed below.  All EKGs below represent my independent interpretation.   ED Course as of 10/15/24 2209   Tue Oct 15, 2024   2100  Introduced myself to the patient, obtained history of present illness, and performed initial physical exam at this time.    2109 Patient is a 29-year-old who presents with sensation of swelling on the right cheek/lip, nausea, epigastric discomfort about 30 minutes after eating a cookie that had a wall [place], she has history of tree nut allergies.  On arrival she is comfortable appearing, has normal blood pressure, temperature, heart rate.  No evidence of facial edema erythema or urticaria externally.  There is no swelling of the mucosa internally although she does feel some fullness to her right cheek.  Normal phonation and managing secretions well.  Based on her description (facial swelling, itching, nausea) this would technically fit as anaphylaxis, but she is quite well currently.  We will monitor closely, give IV Solu-Medrol, Pepcid, Zofran and Benadryl, and see if symptoms do not.  If not we will administer IM epinephrine.   2207 I rechecked the patient.  Medications were administered little less than an hour ago.  She feels much better already.  She continues to have no objective facial or oropharyngeal swelling.  Vital signs have remained normal.  At this point I am comfortable with patient leaving, as she would like to go home.  She has an EpiPen at home to use if needed.  We discussed return  precautions.  She was sent home with PepcidMarycarmenan, return precautions.         Medical Decision Making  Obtained supplemental history:Supplemental history obtained?: Friend  Reviewed external records: External records reviewed?: No  Care impacted by chronic illness:Chronic Pain, Mental Health, Smoking / Nicotine Use, and Other: macromastia  Care significantly affected by social determinants of health:N/A  Did you consider but not order tests?: Work up considered but not performed and documented in chart, if applicable  Did you interpret images independently?: Independent interpretation of ECG and images noted in documentation, when applicable.  Consultation discussion with other provider: Phone conversation with consultants will be documented in the ED Course  Discharge. I prescribed additional prescription strength medication(s) as charted. I considered admission, but discharged patient after significant clinical improvement.    Not Applicable    At the conclusion of the encounter I discussed the results of all of the tests and the disposition. The questions were answered. The patient or family acknowledged understanding and was agreeable with the care plan.     MEDICATIONS GIVEN IN THE EMERGENCY:  Medications   diphenhydrAMINE (BENADRYL) injection 25 mg (25 mg Intravenous $Given 10/15/24 2118)   methylPREDNISolone Na Suc (solu-MEDROL) injection 125 mg (125 mg Intravenous $Given 10/15/24 2117)   famotidine (PEPCID) injection 40 mg (40 mg Intravenous $Given 10/15/24 2118)   ondansetron (ZOFRAN) injection 4 mg (4 mg Intravenous $Given 10/15/24 2118)         NEW PRESCRIPTIONS STARTED AT TODAY'S ER VISIT  New Prescriptions    FAMOTIDINE (PEPCID) 40 MG TABLET    Take 1 tablet (40 mg) by mouth daily for 5 days.    ONDANSETRON (ZOFRAN ODT) 4 MG ODT TAB    Take 1 tablet (4 mg) by mouth every 6 hours as needed for nausea.          =================================================================    HPI    Cindy Contreras  is a 29 year old adult who presents to this ED for evaluation of allergic reaction.    Patient, accidentally ate a cookie with a walnut in it thirty minutes prior to arrival (8:30 PM). Per their friend, the symptoms started 1-2 minutes after eating the cookie with itching in the throat and tongue. The patient endorses swelling in the lips, tongue, throat, abdominal pain, nausea, vomiting, bumps on her lips, blotch on the inside of her right cheek. Symptoms have stagnated, are not currently getting worse.      VITALS:  /75   Pulse 85   Temp 97.9  F (36.6  C)   Resp 16   Wt 53.5 kg (118 lb)   LMP 10/06/2024 (Approximate)   SpO2 100%   BMI 22.30 kg/m      PHYSICAL EXAM    Constitutional: Well developed, well nourished. Comfortable appearing.  HENT: Atraumatic, mucous membranes moist, nose normal. Neck- Supple, gross ROM intact. No swelling of the tong, lips, tonsils. No tenderness or firmness of submandibular soft tissue. Normal phonation.  Eyes: Pupils mid-range, conjunctiva without injection, no discharge.   Respiratory: Clear to auscultation bilaterally, no respiratory distress, no wheezing, speaks full sentences easily. No cough.  Cardiovascular: Normal heart rate, regular rhythm, no murmurs.   GI: Soft, no tenderness to deep palpation in all quadrants, no masses.  Musculoskeletal: Moving all 4 extremities intentionally and without pain. No obvious deformity.  Skin: Warm, dry, no rash.  Neurologic: Alert & oriented x 3, cranial nerves grossly intact.  Psychiatric: Affect normal, cooperative.        I, Africa Manning am serving as a scribe to document services personally performed by Dr. Ulices Harrison based on my observation and the provider's statements to me. IUlices MD attest that Africa Manning is acting in a scribe capacity, has observed my performance of the services and has documented them in accordance with my direction.    Ulices Harrison M.D.  Emergency Medicine  [place]  [facility] EMERGENCY DEPARTMENT  [facility] [facility] [address]  [phone]  Dept: [phone]       Ulices Harrison MD  10/15/24 4076

## 2024-10-16 NOTE — DISCHARGE INSTRUCTIONS
Please take the Pepcid for the next 5 days, this will help keep stomach acid down, and you can add Zofran as needed for nausea.    If you develop swelling of your tongue, or feel tightness or swelling in the back of the throat, please immediately administer your EpiPen and call 911 to be brought back to the ER.    Over-the-counter Benadryl can help you develop any itching, rash, or hives over the next couple of days    If you are still feeling unwell tomorrow, recommend taking a day off from work to monitor symptoms.

## 2024-10-17 ENCOUNTER — PATIENT OUTREACH (OUTPATIENT)
Dept: FAMILY MEDICINE | Facility: CLINIC | Age: 29
End: 2024-10-17
Payer: COMMERCIAL

## 2024-10-17 NOTE — TELEPHONE ENCOUNTER
Writer called patient and left message to return call to clinic.     If patient returns call please route to nurse queue to complete [facility] hospital follow up questionnaire, medication reconciliation and assist with scheduling a hospital follow up visit.    Marycruz More RN  [facility]

## 2024-10-18 RX ORDER — AZELASTINE 1 MG/ML
2 SPRAY, METERED NASAL DAILY
COMMUNITY

## 2024-10-18 NOTE — TELEPHONE ENCOUNTER
Transitions of Care Outreach  Chief Complaint   Patient presents with    Hospital F/U       Most Recent Admission Date: 10/15/2024   Most Recent Admission Diagnosis:      Most Recent Discharge Date: 10/15/2024   Most Recent Discharge Diagnosis: Allergic reaction, initial encounter - T78.40XA  Anaphylaxis, initial encounter - T78.2XXA     Transitions of Care Assessment    Discharge Assessment  How are you doing now that you are home?: Doing ok, meds they gave helped  How are your symptoms? (Red Flag symptoms escalate to triage hotline per guidelines): Improved  Do you know how to contact your clinic care team if you have future questions or changes to your health status? : Yes  Does the patient have their discharge instructions? : Yes  Does the patient have questions regarding their discharge instructions? : No  Were you started on any new medications or were there changes to any of your previous medications? : Yes  Does the patient have all of their medications?: Yes  Do you have questions regarding any of your medications? : No    Follow up Plan     Discharge Follow-Up  Discharge follow up appointment scheduled in alignment with recommended follow up timeframe or Transitions of Risk Category? (Low = within 30 days; Moderate= within 14 days; High= within 7 days): No  Patient's follow up appointment not scheduled: Patient declined scheduling support. Education on the importance of transitions of care follow up. Provided scheduling phone number.    Future Appointments   Date Time Provider Department Center   10/28/2024 11:30 AM Dejan Elmore MD [facility]   10/30/2024  8:40 AM Siobhan Covington APRN CNP [id number]   11/6/2024  8:40 AM Eduardo Prescott PT [facility]   11/13/2024  9:20 AM Eduardo Prescott PT [facility]       Outpatient Plan as outlined on AVS reviewed with patient.    For any urgent concerns, please contact our 24 hour nurse triage line: [phone] ([phone])       Betzaida  Khai RN

## 2024-10-20 ENCOUNTER — HEALTH MAINTENANCE LETTER (OUTPATIENT)
Age: 29
End: 2024-10-20

## 2024-10-24 ENCOUNTER — TELEPHONE (OUTPATIENT)
Dept: ANESTHESIOLOGY | Facility: CLINIC | Age: 29
End: 2024-10-24
Payer: COMMERCIAL

## 2024-10-28 ENCOUNTER — OFFICE VISIT (OUTPATIENT)
Dept: ANESTHESIOLOGY | Facility: CLINIC | Age: 29
End: 2024-10-28
Attending: NURSE PRACTITIONER
Payer: COMMERCIAL

## 2024-10-28 VITALS
HEART RATE: 92 BPM | RESPIRATION RATE: 16 BRPM | OXYGEN SATURATION: 100 % | SYSTOLIC BLOOD PRESSURE: 118 MMHG | DIASTOLIC BLOOD PRESSURE: 86 MMHG

## 2024-10-28 DIAGNOSIS — M54.2 NECK PAIN: ICD-10-CM

## 2024-10-28 DIAGNOSIS — M25.512 CHRONIC PAIN OF BOTH SHOULDERS: ICD-10-CM

## 2024-10-28 DIAGNOSIS — G89.29 CHRONIC PAIN OF BOTH SHOULDERS: ICD-10-CM

## 2024-10-28 DIAGNOSIS — M54.6 CHRONIC BILATERAL THORACIC BACK PAIN: ICD-10-CM

## 2024-10-28 DIAGNOSIS — G89.29 CHRONIC BILATERAL THORACIC BACK PAIN: ICD-10-CM

## 2024-10-28 DIAGNOSIS — M25.511 CHRONIC PAIN OF BOTH SHOULDERS: ICD-10-CM

## 2024-10-28 PROCEDURE — 99204 OFFICE O/P NEW MOD 45 MIN: CPT | Mod: GC | Performed by: STUDENT IN AN ORGANIZED HEALTH CARE EDUCATION/TRAINING PROGRAM

## 2024-10-28 RX ORDER — METHOCARBAMOL 500 MG/1
500-1000 TABLET, FILM COATED ORAL AT BEDTIME
Qty: 30 TABLET | Refills: 1 | Status: SHIPPED | OUTPATIENT
Start: 2024-10-28

## 2024-10-28 ASSESSMENT — PAIN SCALES - GENERAL: PAINLEVEL_OUTOF10: MODERATE PAIN (4)

## 2024-10-28 NOTE — NURSING NOTE
Patient presents with:  Consult  Back Pain: Chronic back pain-10years      Moderate Pain (4)     Pain Medications       Opioid Agonists Refills Start End     oxyCODONE (ROXICODONE) 5 MG tablet 0 4/4/2024 --    Sig - Route: Take 1-2 tablets (5-10 mg) by mouth every 6 hours as needed for moderate to severe pain - Oral    Patient not taking: Reported on 10/28/2024    Class: E-Prescribe    Earliest Fill Date: 4/4/2024            What medications are you using for pain? cyclobenzaprine    Have you been seen by another pain clinic/ provider? no    Expectations: none    Ivet Espinal LPN
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
## 2024-10-28 NOTE — PATIENT INSTRUCTIONS
Medications:    Robaxin - Take 500-1000 mg daily at bedtime. May titrate up to 1000 mg three times daily.    Discontinue Flexeril    Over the counter lidocaine patches and diclofenac gel    Please provide the clinic with a minium of 1 week notice, on all prescription refills.       Referral:    Pain Psychology Referral placed-  Please contact their scheduling office at [phone] to schedule, if you have not heard from them within 2 business days.     Pain psychology Therapies Requested- Mindfulness training, CBT, Coping and relaxation therapy.     Please schedule a follow up appointment with your Pain Clinic provider after completing Pain Psychology appointment. Schedule appointment by calling [phone].       Treatment planning:    Send form via [employer] for Dr. Elmore to complete.      Recommended Follow up:      Follow up in 1-2 months    To speak with a nurse, schedule/reschedule/cancel a clinic appointment, or request a medication refill call: [phone].    You can also reach us by [employer]: [email or web address]

## 2024-10-30 ENCOUNTER — OFFICE VISIT (OUTPATIENT)
Dept: FAMILY MEDICINE | Facility: CLINIC | Age: 29
End: 2024-10-30
Attending: NURSE PRACTITIONER
Payer: COMMERCIAL

## 2024-10-30 VITALS
TEMPERATURE: 97.6 F | SYSTOLIC BLOOD PRESSURE: 95 MMHG | OXYGEN SATURATION: 99 % | HEIGHT: 61 IN | HEART RATE: 92 BPM | DIASTOLIC BLOOD PRESSURE: 66 MMHG | WEIGHT: 119 LBS | BODY MASS INDEX: 22.47 KG/M2 | RESPIRATION RATE: 16 BRPM

## 2024-10-30 DIAGNOSIS — F31.81 BIPOLAR 2 DISORDER (H): ICD-10-CM

## 2024-10-30 DIAGNOSIS — M54.2 NECK PAIN: ICD-10-CM

## 2024-10-30 DIAGNOSIS — Z30.09 BIRTH CONTROL COUNSELING: ICD-10-CM

## 2024-10-30 DIAGNOSIS — G89.29 CHRONIC BILATERAL THORACIC BACK PAIN: ICD-10-CM

## 2024-10-30 DIAGNOSIS — F60.3 BORDERLINE PERSONALITY DISORDER (H): ICD-10-CM

## 2024-10-30 DIAGNOSIS — M25.512 CHRONIC PAIN OF BOTH SHOULDERS: ICD-10-CM

## 2024-10-30 DIAGNOSIS — G89.29 CHRONIC PAIN OF BOTH SHOULDERS: ICD-10-CM

## 2024-10-30 DIAGNOSIS — M54.6 CHRONIC BILATERAL THORACIC BACK PAIN: ICD-10-CM

## 2024-10-30 DIAGNOSIS — M25.511 CHRONIC PAIN OF BOTH SHOULDERS: ICD-10-CM

## 2024-10-30 DIAGNOSIS — Z00.00 ENCOUNTER FOR ROUTINE HISTORY AND PHYSICAL EXAM IN FEMALE: Primary | ICD-10-CM

## 2024-10-30 DIAGNOSIS — Z11.3 SCREENING FOR STD (SEXUALLY TRANSMITTED DISEASE): ICD-10-CM

## 2024-10-30 DIAGNOSIS — L70.0 ACNE VULGARIS: ICD-10-CM

## 2024-10-30 PROBLEM — N62 MACROMASTIA: Status: RESOLVED | Noted: 2024-01-03 | Resolved: 2024-10-30

## 2024-10-30 PROBLEM — F17.200 SMOKER: Status: RESOLVED | Noted: 2019-12-09 | Resolved: 2024-10-30

## 2024-10-30 PROCEDURE — 87491 CHLMYD TRACH DNA AMP PROBE: CPT | Performed by: NURSE PRACTITIONER

## 2024-10-30 PROCEDURE — 99395 PREV VISIT EST AGE 18-39: CPT | Mod: 25 | Performed by: NURSE PRACTITIONER

## 2024-10-30 PROCEDURE — 99214 OFFICE O/P EST MOD 30 MIN: CPT | Mod: 25 | Performed by: NURSE PRACTITIONER

## 2024-10-30 PROCEDURE — 90656 IIV3 VACC NO PRSV 0.5 ML IM: CPT | Performed by: NURSE PRACTITIONER

## 2024-10-30 PROCEDURE — 87389 HIV-1 AG W/HIV-1&-2 AB AG IA: CPT | Performed by: NURSE PRACTITIONER

## 2024-10-30 PROCEDURE — 90480 ADMN SARSCOV2 VAC 1/ONLY CMP: CPT | Performed by: NURSE PRACTITIONER

## 2024-10-30 PROCEDURE — 90471 IMMUNIZATION ADMIN: CPT | Performed by: NURSE PRACTITIONER

## 2024-10-30 PROCEDURE — 86780 TREPONEMA PALLIDUM: CPT | Performed by: NURSE PRACTITIONER

## 2024-10-30 PROCEDURE — 36415 COLL VENOUS BLD VENIPUNCTURE: CPT | Performed by: NURSE PRACTITIONER

## 2024-10-30 PROCEDURE — 91320 SARSCV2 VAC 30MCG TRS-SUC IM: CPT | Performed by: NURSE PRACTITIONER

## 2024-10-30 PROCEDURE — 87591 N.GONORRHOEAE DNA AMP PROB: CPT | Performed by: NURSE PRACTITIONER

## 2024-10-30 RX ORDER — SPIRONOLACTONE 100 MG/1
100 TABLET, FILM COATED ORAL DAILY
Qty: 90 TABLET | Refills: 3 | Status: SHIPPED | OUTPATIENT
Start: 2024-10-30

## 2024-10-30 RX ORDER — NORGESTIMATE AND ETHINYL ESTRADIOL 0.25-0.035
1 KIT ORAL DAILY
Qty: 84 TABLET | Refills: 3 | Status: SHIPPED | OUTPATIENT
Start: 2024-10-30

## 2024-10-30 SDOH — HEALTH STABILITY: PHYSICAL HEALTH: ON AVERAGE, HOW MANY DAYS PER WEEK DO YOU ENGAGE IN MODERATE TO STRENUOUS EXERCISE (LIKE A BRISK WALK)?: 3 DAYS

## 2024-10-30 SDOH — HEALTH STABILITY: PHYSICAL HEALTH: ON AVERAGE, HOW MANY MINUTES DO YOU ENGAGE IN EXERCISE AT THIS LEVEL?: 30 MIN

## 2024-10-30 ASSESSMENT — SOCIAL DETERMINANTS OF HEALTH (SDOH): HOW OFTEN DO YOU GET TOGETHER WITH FRIENDS OR RELATIVES?: MORE THAN THREE TIMES A WEEK

## 2024-10-30 ASSESSMENT — PAIN SCALES - GENERAL: PAINLEVEL_OUTOF10: NO PAIN (0)

## 2024-10-30 NOTE — PROGRESS NOTES
Assessment and Plan:    Encounter for routine history and physical exam in female  Recommend consuming a healthy diet and exercising.  Provided influenza and COVID vaccines.  She will follow-up for nurse only HPV vaccine.    Screening for STD (sexually transmitted disease)  Discussed safe sex practices.  Will notify patient results.  - HIV Antigen Antibody Combo Cascade  - Treponema Abs w Reflex to RPR and Titer  - Chlamydia trachomatis PCR  - Neisseria gonorrhoeae PCR    Birth control counseling  Renewed OCP.  Educated on its indications and side effects include increased risk of blood clots.  Will refer to gynecology for possible tubal ligation.  - norgestimate-ethinyl estradiol (SUN) 0.25-35 MG-MCG tablet  Dispense: 84 tablet; Refill: 3  - Ob/Gyn  Referral    Acne vulgaris  She continues spironolactone.  Renal function and electrolytes were normal in August.  - spironolactone (ALDACTONE) 100 MG tablet  Dispense: 90 tablet; Refill: 3    Borderline personality disorder (H)  Bipolar 2 disorder (H)  Patient is followed by psychiatry.  She continues Abilify and propranolol.    Chronic pain of both shoulders  Chronic bilateral thoracic back pain  Neck pain  Patient is followed by the pain clinic.  She continues muscle relaxants.  She is completing physical therapy.      Subjective:     Cindy is a 29 year old adult presenting to the clinic for a female physical.     LMP: beginning of October   Hx of abnormal pap smear: none   Last pap smear: 6/20/23 normal   Perform self-breast exams: none   Vaginal discharge or irritation: none   Sexually active: yes, single   Contraception: ocp   Concerns for STDs: would like to get evaluated    Previous pregnancies:none     Patient had a breast reduction this year.  She likes her results.  She continues to experience chronic back pain, though.  She is being seen at the [facility].  She is receiving chiropractic care and completing physical therapy.  She has  been diagnosed with myofascial pain.  She is applying lidocaine patches and taking cyclobenzaprine and methocarbamol.  Patient saw psychiatry and had a neuropsych eval.  She was diagnosed with borderline personality disorder and bipolar disorder.  She suffers from depression.  She is taking Abilify daily and propranolol as needed for anxiety.  She is completing DBT.  Patient is seeing a weight loss clinic where she is receiving phentermine and topiramate.  She takes spironolactone for acne.  Patient is interested in a tubal ligation.  She has no interest in having children.  She has tried numerous birth control options including IUD, Nexplanon, Depo injections with side effects.  She is taking an oral contraceptive, but frequently forgets to take the medication.      Review of systems:  I performed a 10 point review of systems.  All pertinent positives and negatives are noted in the HPI. All others are negative.     Allergies   Allergen Reactions    Nuts Other (See Comments)     Tree nuts  Other reaction(s): Other (See Comments)  Tree nuts       Current Outpatient Medications   Medication Sig Dispense Refill    ARIPiprazole (ABILIFY) 5 MG tablet Take 1 tablet (5 mg) by mouth daily. 30 tablet 1    azelastine (ASTELIN) 0.1 % nasal spray Spray 2 sprays into both nostrils daily.      cyclobenzaprine (FLEXERIL) 5 MG tablet Take 1 tablet (5 mg) by mouth 3 times daily as needed for muscle spasms. 30 tablet 0    loratadine (CLARITIN) 10 MG tablet Take 10 mg by mouth daily as needed for allergies.      methocarbamol (ROBAXIN) 500 MG tablet Take 1-2 tablets (500-1,000 mg) by mouth at bedtime. 30 tablet 1    multivitamin w/minerals (MULTI-VITAMIN) tablet Take 1 tablet by mouth daily      norgestimate-ethinyl estradiol (SUN) 0.25-35 MG-MCG tablet TAKE 1 TABLET BY MOUTH EVERY DAY 84 tablet 1    phentermine 15 MG capsule Take 1 capsule (15 mg) by mouth every morning 30 capsule 3    propranolol (INDERAL) 10 MG tablet Take 1  tablet (10 mg) by mouth 2 times daily as needed (for performance anxiety or panic) 60 tablet 1    spironolactone (ALDACTONE) 100 MG tablet Take 1 tablet (100 mg) by mouth daily 90 tablet 3    topiramate (TOPAMAX) 25 MG tablet Take  75mg at bedtime 90 tablet 3    Cholecalciferol (VITAMIN D-3) 125 MCG (5000 UT) TABS Take 1 capsule by mouth daily (Patient not taking: Reported on 10/7/2024) 30 tablet 3    lactobacillus rhamnosus, GG, (CULTURELL) capsule Take 1 capsule by mouth 2 times daily (Patient not taking: Reported on 10/7/2024)      ondansetron (ZOFRAN ODT) 4 MG ODT tab Take 1 tablet (4 mg) by mouth every 6 hours as needed for nausea. (Patient not taking: Reported on 10/30/2024) 6 tablet 0    ondansetron (ZOFRAN ODT) 4 MG ODT tab Take 1 tablet (4 mg) by mouth every 8 hours as needed for nausea (Patient not taking: Reported on 10/30/2024) 4 tablet 0    oxyCODONE (ROXICODONE) 5 MG tablet Take 1-2 tablets (5-10 mg) by mouth every 6 hours as needed for moderate to severe pain (Patient not taking: Reported on 2024) 20 tablet 0    senna-docusate (SENOKOT-S/PERICOLACE) 8.6-50 MG tablet Take 1-2 tablets by mouth 2 times daily (Patient not taking: Reported on 2024) 30 tablet 0     No current facility-administered medications for this visit.       Social History     Socioeconomic History    Marital status: Single     Spouse name: Not on file    Number of children: Not on file    Years of education: Not on file    Highest education level: Not on file   Occupational History    Not on file   Tobacco Use    Smoking status: Former     Current packs/day: 0.00     Average packs/day: 0.5 packs/day for 5.0 years (2.5 ttl pk-yrs)     Types: Cigarettes     Start date: 2015     Quit date: 2020     Years since quittin.5     Passive exposure: Current    Smokeless tobacco: Former   Vaping Use    Vaping status: Every Day    Start date: 2024    Last attempt to quit: 2023    Substances: Nicotine, Flavoring     Devices: Disposable    Passive vaping exposure: Yes   Substance and Sexual Activity    Alcohol use: Yes     Alcohol/week: 12.0 standard drinks of alcohol     Types: 12 Cans of beer per week    Drug use: Never    Sexual activity: Yes     Partners: Male     Birth control/protection: Implant     Comment: Nexplanon   Other Topics Concern    Parent/sibling w/ CABG, MI or angioplasty before 65F 55M? No   Social History Narrative    Not on file     Social Drivers of Health     Financial Resource Strain: Low Risk  (10/30/2024)    Financial Resource Strain     Within the past 12 months, have you or your family members you live with been unable to get utilities (heat, electricity) when it was really needed?: No   Food Insecurity: Low Risk  (10/30/2024)    Food Insecurity     Within the past 12 months, did you worry that your food would run out before you got money to buy more?: No     Within the past 12 months, did the food you bought just not last and you didn t have money to get more?: No   Transportation Needs: Low Risk  (10/30/2024)    Transportation Needs     Within the past 12 months, has lack of transportation kept you from medical appointments, getting your medicines, non-medical meetings or appointments, work, or from getting things that you need?: No   Physical Activity: Insufficiently Active (10/30/2024)    Exercise Vital Sign     Days of Exercise per Week: 3 days     Minutes of Exercise per Session: 30 min   Stress: Stress Concern Present (10/30/2024)    [employer] - Occupational Stress Questionnaire     Feeling of Stress : Very much   Social Connections: Unknown (10/30/2024)    Social Connection and Isolation Panel [NHANES]     Frequency of Communication with Friends and Family: Not on file     Frequency of Social Gatherings with Friends and Family: More than three times a week     Attends [employer] Services: Not on file     Active Member of Clubs or Organizations: Not on file      "Attends Club or Organization Meetings: Not on file     Marital Status: Not on file   Interpersonal Safety: Low Risk  (10/30/2024)    Interpersonal Safety     Do you feel physically and emotionally safe where you currently live?: Yes     Within the past 12 months, have you been hit, slapped, kicked or otherwise physically hurt by someone?: No     Within the past 12 months, have you been humiliated or emotionally abused in other ways by your partner or ex-partner?: No   Housing Stability: High Risk (10/30/2024)    Housing Stability     Do you have housing? : No     Are you worried about losing your housing?: No       Past Medical History:   Diagnosis Date    Bipolar 2 disorder (H) 01/23/2023       Family History   Problem Relation Age of Onset    Depression Mother     Anxiety Disorder Father     Other Cancer Maternal Grandfather     Diabetes Paternal Grandmother     Anxiety Disorder Sister        Past Surgical History:   Procedure Laterality Date    MAMMOPLASTY REDUCTION BILATERAL Bilateral 4/4/2024    Procedure: MAMMOPLASTY, REDUCTION, BILATERAL;  Surgeon: FENG Pollock MD;  Location: [facility] [place]       Objective:     BP 95/66   Pulse 92   Temp 97.6  F (36.4  C)   Resp 16   Ht 1.549 m (5' 1\")   Wt 54 kg (119 lb)   LMP 10/06/2024 (Approximate)   SpO2 99%   Breastfeeding No   BMI 22.48 kg/m      Patient is alert, no obvious distress.   Skin: Warm, dry.  No rashes or lesions. Skin turgor rapid return.   HEENT:  Eyes normal.  Ears normal.  Nose patent, mucosa pink.  Oropharynx mucosa pink, no lesions or tonsil enlargement.   Neck:  Supple, without lymphadenopathy, bruits, JVD. Thyroid normal texture and size.    Lungs:  Clear to auscultation.  No wheezing, rales noted.  Respirations even and unlabored.   Heart:  Regular rate and rhythm.  No murmurs.   Breasts:  Normal.  No surrounding adenopathy.   Abdomen: Soft, nontender.  No organomegaly.  Bowel sounds normoactive.  No guarding or masses noted.   :  " deferred  Musculoskeletal:  Full ROM of extremities.  Muscle strength equal +5/5.   Neurological:  Cranial nerves 2-12 intact.

## 2024-10-31 LAB
C TRACH DNA SPEC QL NAA+PROBE: NEGATIVE
HIV 1+2 AB+HIV1 P24 AG SERPL QL IA: NONREACTIVE
N GONORRHOEA DNA SPEC QL NAA+PROBE: NEGATIVE
T PALLIDUM AB SER QL: NONREACTIVE

## 2024-11-01 ENCOUNTER — HOSPITAL ENCOUNTER (EMERGENCY)
Facility: HOSPITAL | Age: 29
Discharge: HOME | End: 2024-11-01
Attending: EMERGENCY MEDICINE | Admitting: EMERGENCY MEDICINE
Payer: COMMERCIAL

## 2024-11-01 VITALS
RESPIRATION RATE: 18 BRPM | HEIGHT: 61 IN | DIASTOLIC BLOOD PRESSURE: 68 MMHG | TEMPERATURE: 97.2 F | WEIGHT: 120.2 LBS | BODY MASS INDEX: 22.69 KG/M2 | OXYGEN SATURATION: 100 % | SYSTOLIC BLOOD PRESSURE: 105 MMHG | HEART RATE: 76 BPM

## 2024-11-01 DIAGNOSIS — R42 LIGHTHEADEDNESS: ICD-10-CM

## 2024-11-01 LAB
ANION GAP SERPL CALCULATED.3IONS-SCNC: 11 MMOL/L (ref 7–15)
BASOPHILS # BLD AUTO: 0.1 10E3/UL (ref 0–0.2)
BASOPHILS NFR BLD AUTO: 1 %
BUN SERPL-MCNC: 10 MG/DL (ref 6–20)
CALCIUM SERPL-MCNC: 8.8 MG/DL (ref 8.8–10.4)
CHLORIDE SERPL-SCNC: 105 MMOL/L (ref 98–107)
CREAT SERPL-MCNC: 0.8 MG/DL (ref 0.51–1.17)
EGFRCR SERPLBLD CKD-EPI 2021: >90 ML/MIN/1.73M2
EOSINOPHIL # BLD AUTO: 0.4 10E3/UL (ref 0–0.7)
EOSINOPHIL NFR BLD AUTO: 5 %
ERYTHROCYTE [DISTWIDTH] IN BLOOD BY AUTOMATED COUNT: 11.8 % (ref 10–15)
FLUAV RNA SPEC QL NAA+PROBE: NEGATIVE
FLUBV RNA RESP QL NAA+PROBE: NEGATIVE
GLUCOSE SERPL-MCNC: 91 MG/DL (ref 70–99)
HCG SERPL QL: NEGATIVE
HCO3 SERPL-SCNC: 20 MMOL/L (ref 22–29)
HCT VFR BLD AUTO: 40.7 % (ref 35–53)
HGB BLD-MCNC: 13.6 G/DL (ref 11.7–17.7)
IMM GRANULOCYTES # BLD: 0 10E3/UL
IMM GRANULOCYTES NFR BLD: 0 %
LYMPHOCYTES # BLD AUTO: 2 10E3/UL (ref 0.8–5.3)
LYMPHOCYTES NFR BLD AUTO: 27 %
MAGNESIUM SERPL-MCNC: 2 MG/DL (ref 1.7–2.3)
MCH RBC QN AUTO: 29.6 PG (ref 26.5–33)
MCHC RBC AUTO-ENTMCNC: 33.4 G/DL (ref 31.5–36.5)
MCV RBC AUTO: 89 FL (ref 78–100)
MONOCYTES # BLD AUTO: 0.6 10E3/UL (ref 0–1.3)
MONOCYTES NFR BLD AUTO: 8 %
NEUTROPHILS # BLD AUTO: 4.4 10E3/UL (ref 1.6–8.3)
NEUTROPHILS NFR BLD AUTO: 59 %
NRBC # BLD AUTO: 0 10E3/UL
NRBC BLD AUTO-RTO: 0 /100
PLAT MORPH BLD: NORMAL
PLATELET # BLD AUTO: 232 10E3/UL (ref 150–450)
POTASSIUM SERPL-SCNC: 4.2 MMOL/L (ref 3.4–5.3)
RBC # BLD AUTO: 4.6 10E6/UL (ref 3.8–5.9)
RBC MORPH BLD: NORMAL
RSV RNA SPEC NAA+PROBE: NEGATIVE
SARS-COV-2 RNA RESP QL NAA+PROBE: NEGATIVE
SODIUM SERPL-SCNC: 136 MMOL/L (ref 135–145)
TROPONIN T SERPL HS-MCNC: <6 NG/L
TSH SERPL DL<=0.005 MIU/L-ACNC: 1.3 UIU/ML (ref 0.3–4.2)
WBC # BLD AUTO: 7.4 10E3/UL (ref 4–11)

## 2024-11-01 PROCEDURE — 93005 ELECTROCARDIOGRAM TRACING: CPT | Performed by: EMERGENCY MEDICINE

## 2024-11-01 PROCEDURE — 84703 CHORIONIC GONADOTROPIN ASSAY: CPT | Performed by: EMERGENCY MEDICINE

## 2024-11-01 PROCEDURE — 83735 ASSAY OF MAGNESIUM: CPT | Performed by: EMERGENCY MEDICINE

## 2024-11-01 PROCEDURE — 84484 ASSAY OF TROPONIN QUANT: CPT | Performed by: EMERGENCY MEDICINE

## 2024-11-01 PROCEDURE — 85004 AUTOMATED DIFF WBC COUNT: CPT | Performed by: EMERGENCY MEDICINE

## 2024-11-01 PROCEDURE — 80048 BASIC METABOLIC PNL TOTAL CA: CPT | Performed by: EMERGENCY MEDICINE

## 2024-11-01 PROCEDURE — 99284 EMERGENCY DEPT VISIT MOD MDM: CPT

## 2024-11-01 PROCEDURE — 87637 SARSCOV2&INF A&B&RSV AMP PRB: CPT | Performed by: EMERGENCY MEDICINE

## 2024-11-01 PROCEDURE — 84443 ASSAY THYROID STIM HORMONE: CPT | Performed by: EMERGENCY MEDICINE

## 2024-11-01 PROCEDURE — 36415 COLL VENOUS BLD VENIPUNCTURE: CPT | Performed by: EMERGENCY MEDICINE

## 2024-11-01 ASSESSMENT — ACTIVITIES OF DAILY LIVING (ADL)
ADLS_ACUITY_SCORE: 0
ADLS_ACUITY_SCORE: 0

## 2024-11-01 NOTE — ED TRIAGE NOTES
Wednesday low BP, SBP in 90's. Takes Med for acne that also decreases BP, has been taking for years but recently changed from taking in morning to taking at night, unsure if this could be playing role in dizziness/low BP. Dizziness started yesterday intermittently, now today every time patient stands up has feeling of nearly passing out. No chest pain/sob. Some dizziness while sitting here now, feels like the butterfly feeling in head. Fuzzy vision with near syncopal episodes. Eating and drinking normally, no other symptoms.

## 2024-11-01 NOTE — ED NOTES
Pt back in ED waiting room in w/c reports room spinning dizziness and nausea as staff brings pt to ED bed.

## 2024-11-01 NOTE — ED PROVIDER NOTES
EMERGENCY DEPARTMENT ENCOUNTER     NAME: Cindy Contreras   AGE: 29 year old adult   YOB: 1995   MRN: [id number]   EVALUATION DATE & TIME: No admission date for patient encounter.   PCP: Siobhan Covington     Chief Complaint   Patient presents with    Dizziness   :    FINAL IMPRESSION       1. Lightheadedness           ED COURSE & MEDICAL DECISION MAKING    9:05 AM I met with the patient, obtained history, performed an initial exam, and discussed options and plan for diagnostics and treatment here in the ED.  10:52 AM Rechecked and updated patient on lab results. Discussed plan for discharge.    Pertinent Labs & Imaging studies reviewed. (See chart for details)   29 year old adult  presents to the Emergency Department for evaluation of lightheadedness worse in the morning for the last couple of days.  Patient did receive vaccinations 2 days ago. Initial Vitals Reviewed. Initial exam notable for generally well-appearing patient in no acute distress.  Vital signs are normal and exam is nonfocal.  It sounds like the patient has a history of lightheadedness which is probably exacerbated by not eating breakfast, having recent vaccinations.  Low suspicion for something like ACS, they are PERC negative and I do not suspect pulmonary embolus, there is been no actual syncope.  EKG does not show any significant arrhythmia or concerning findings.  Labs are negative for things like anemia, electrolyte disturbance, dehydration, thyroid dysfunction, pregnancy, ACS.  Patient and partner feel reassured and we will have them increase fluid intake and stand up slowly over the next couple of days.  Return precautions discussed and they are comfortable with discharge.           At the conclusion of the encounter I discussed the results of all of the tests and the disposition. The questions were answered. The patient or family acknowledged understanding and was agreeable with the care plan.     0 minutes critical care time,  "see procedure note below for details if relevant    Medical Decision Making    History:  Supplemental history from: Documented in chart, significant other  External Record(s) reviewed: Documented in chart, office visit from 10/30/2024    Work Up:  Chart documentation includes differential considered and any EKGs or imaging independently interpreted by provider, where specified.  In additional to work up documented, I considered the following work up: Documented in chart, if applicable.    External consultation:  Discussion of management with another provider: Documented in chart, if applicable    Complicating factors:  Care impacted by chronic illness: Mental health  Care affected by social determinants of health: N/A    Disposition considerations: Discharge. No recommendations on prescription strength medication(s). See documentation for any additional details.,  I considered admission but ultimately discharged the patient with reassuring workup    Not Applicable            MEDICATIONS GIVEN IN THE EMERGENCY:   Medications - No data to display   NEW PRESCRIPTIONS STARTED AT TODAY'S ER VISIT   New Prescriptions    No medications on file     ================================================================   HISTORY OF PRESENT ILLNESS       Patient information was obtained from: Patient   Use of Intrepreter: N/A    Cindy Contreras is a 29 year old adult with history of bipolar II disorder and acne, who presents with lightheadedness.    Patient reports they have been feeling really dizzy intermittently whenever she sits up or stands up \"every now and then\" for a while. She describes this dizziness as feeling lightheaded rather. The lightheadedness started yesterday but it would come and go. This morning, she noticed every time she got up or stood up, she felt lightheaded and near-syncopal. Reports she has been keeping herself hydrated and notes she did drink some water today (but not a lot of water). She didn't eat " breakfast this morning and states she usually doesn't eat breakfast, and instead drank coffee today. She denies having any cough, sore throat or any other viral symptoms. Mentions she got a COVID vaccine on Tuesday (10/29).    In triage, she states she takes medications for acne that also decreases BP,. She's been taking this acne medication for years but recently changed from taking it in morning to taking at night. Patient is unsure if this could be related to her lightheadedness or not. No other reported complaints or concerns at this time.    ================================================================        PAST HISTORY     PAST MEDICAL HISTORY:   Past Medical History:   Diagnosis Date    Bipolar 2 disorder (H) 01/23/2023      PAST SURGICAL HISTORY:   Past Surgical History:   Procedure Laterality Date    MAMMOPLASTY REDUCTION BILATERAL Bilateral 4/4/2024    Procedure: MAMMOPLASTY, REDUCTION, BILATERAL;  Surgeon: FENG Pollock MD;  Location: [facility] OR      CURRENT MEDICATIONS:   ARIPiprazole (ABILIFY) 5 MG tablet  azelastine (ASTELIN) 0.1 % nasal spray  cyclobenzaprine (FLEXERIL) 5 MG tablet  loratadine (CLARITIN) 10 MG tablet  methocarbamol (ROBAXIN) 500 MG tablet  multivitamin w/minerals (MULTI-VITAMIN) tablet  norgestimate-ethinyl estradiol (SUN) 0.25-35 MG-MCG tablet  phentermine 15 MG capsule  propranolol (INDERAL) 10 MG tablet  spironolactone (ALDACTONE) 100 MG tablet  topiramate (TOPAMAX) 25 MG tablet      ALLERGIES:   Allergies   Allergen Reactions    Nuts Other (See Comments)     Tree nuts  Other reaction(s): Other (See Comments)  Tree nuts      FAMILY HISTORY:   Family History   Problem Relation Age of Onset    Depression Mother     Anxiety Disorder Father     Other Cancer Maternal Grandfather     Diabetes Paternal Grandmother     Anxiety Disorder Sister       SOCIAL HISTORY:   Social History     Socioeconomic History    Marital status: Single   Tobacco Use    Smoking status: Former      Current packs/day: 0.00     Average packs/day: 0.5 packs/day for 5.0 years (2.5 ttl pk-yrs)     Types: Cigarettes     Start date: 2015     Quit date: 2020     Years since quittin.5     Passive exposure: Current    Smokeless tobacco: Former   Vaping Use    Vaping status: Every Day    Start date: 2024    Last attempt to quit: 2023    Substances: Nicotine, Flavoring    Devices: Disposable    Passive vaping exposure: Yes   Substance and Sexual Activity    Alcohol use: Not Currently     Comment: once/month    Drug use: Not Currently     Types: Marijuana    Sexual activity: Yes     Partners: Male     Birth control/protection: Implant     Comment: Nexplanon   Other Topics Concern    Parent/sibling w/ CABG, MI or angioplasty before 65F 55M? No     Social Drivers of Health     Financial Resource Strain: Low Risk  (10/30/2024)    Financial Resource Strain     Within the past 12 months, have you or your family members you live with been unable to get utilities (heat, electricity) when it was really needed?: No   Food Insecurity: Low Risk  (10/30/2024)    Food Insecurity     Within the past 12 months, did you worry that your food would run out before you got money to buy more?: No     Within the past 12 months, did the food you bought just not last and you didn t have money to get more?: No   Transportation Needs: Low Risk  (10/30/2024)    Transportation Needs     Within the past 12 months, has lack of transportation kept you from medical appointments, getting your medicines, non-medical meetings or appointments, work, or from getting things that you need?: No   Physical Activity: Insufficiently Active (10/30/2024)    Exercise Vital Sign     Days of Exercise per Week: 3 days     Minutes of Exercise per Session: 30 min   Stress: Stress Concern Present (10/30/2024)    [place] [employer] - Occupational Stress Questionnaire     Feeling of Stress : Very much   Social Connections: Unknown  "(10/30/2024)    Social Connection and Isolation Panel [NHANES]     Frequency of Social Gatherings with Friends and Family: More than three times a week   Interpersonal Safety: Low Risk  (10/30/2024)    Interpersonal Safety     Do you feel physically and emotionally safe where you currently live?: Yes     Within the past 12 months, have you been hit, slapped, kicked or otherwise physically hurt by someone?: No     Within the past 12 months, have you been humiliated or emotionally abused in other ways by your partner or ex-partner?: No   Housing Stability: High Risk (10/30/2024)    Housing Stability     Do you have housing? : No     Are you worried about losing your housing?: No        VITALS  Patient Vitals for the past 24 hrs:   BP Temp Temp src Pulse Resp SpO2 Height Weight   11/01/24 1004 100/62 -- -- 75 -- 100 % -- --   11/01/24 0856 115/75 97.2  F (36.2  C) Temporal 86 20 100 % 1.549 m (5' 1\") 54.5 kg (120 lb 3.2 oz)        ================================================================    PHYSICAL EXAM     VITAL SIGNS: /62   Pulse 75   Temp 97.2  F (36.2  C) (Temporal)   Resp 20   Ht 1.549 m (5' 1\")   Wt 54.5 kg (120 lb 3.2 oz)   LMP 10/06/2024 (Approximate)   SpO2 100%   BMI 22.71 kg/m     Constitutional:  Awake, no acute distress   HENT:  Atraumatic, oropharynx without exudate or erythema, membranes moist  Lymph:  No adenopathy  Eyes: EOM intact, PERRL, no injection  Neck: Supple  Respiratory:  Clear to auscultation bilaterally, no wheezes or crackles   Cardiovascular:  Regular rate and rhythm, single S1 and S2   GI:  Soft, nontender, nondistended, no rebound or guarding   Musculoskeletal:  Moves all extremities, no lower extremity edema, no deformities    Skin:  Warm, dry  Neurologic:  Alert and oriented x3, no focal deficits noted       ================================================================  LAB       All pertinent labs reviewed and interpreted.   Labs Ordered and Resulted from Time " of ED Arrival to Time of ED Departure   BASIC METABOLIC PANEL - Abnormal       Result Value    Sodium 136      Potassium 4.2      Chloride 105      Carbon Dioxide (CO2) 20 (*)     Anion Gap 11      Urea Nitrogen 10.0      Creatinine 0.80      GFR Estimate >90      Calcium 8.8      Glucose 91     TROPONIN T, HIGH SENSITIVITY - Normal    Troponin T, High Sensitivity <6     MAGNESIUM - Normal    Magnesium 2.0     TSH WITH FREE T4 REFLEX - Normal    TSH 1.30     HCG QUALITATIVE PREGNANCY - Normal    hCG Serum Qualitative Negative     INFLUENZA A/B, RSV, & SARS-COV2 PCR - Normal    Influenza A PCR Negative      Influenza B PCR Negative      RSV PCR Negative      SARS CoV2 PCR Negative     CBC WITH PLATELETS AND DIFFERENTIAL    WBC Count 7.4      RBC Count 4.60      Hemoglobin 13.6      Hematocrit 40.7      MCV 89      MCH 29.6      MCHC 33.4      RDW 11.8      Platelet Count 232      % Neutrophils 59      % Lymphocytes 27      % Monocytes 8      % Eosinophils 5      % Basophils 1      % Immature Granulocytes 0      NRBCs per 100 WBC 0      Absolute Neutrophils 4.4      Absolute Lymphocytes 2.0      Absolute Monocytes 0.6      Absolute Eosinophils 0.4      Absolute Basophils 0.1      Absolute Immature Granulocytes 0.0      Absolute NRBCs 0.0     RBC AND PLATELET MORPHOLOGY    RBC Morphology Confirmed RBC Indices      Platelet Assessment        Value: Automated Count Confirmed. Platelet morphology is normal.        ===============================================================  RADIOLOGY       Reviewed all pertinent imaging. Please see official radiology report.   No orders to display         ================================================================  EKG     11/1/2024. 9:03:24. Normal sinus rhythm  Rate: 82 BPM. QTc: 439 ms. Axis: 79.    Comparison: No previous ECGs for comparison.    I have independently reviewed and interpreted the EKG(s) documented above.      ================================================================  PROCEDURES         I, Umu Velasquez, am serving as a scribe to document services personally performed by Dr. Moore based on my observation and the provider's statements to me. I, Roxy Moore MD attest that Umu Velasquez is acting in a scribe capacity, has observed my performance of the services and has documented them in accordance with my direction.   Roxy Moore M.D.   Emergency Medicine   [facility] EMERGENCY DEPARTMENT  [address] [place] [id number]  [phone]  Dept: [phone]      Roxy Moore MD  11/01/24 1050

## 2024-11-01 NOTE — Clinical Note
Cindy Contreras was seen and treated in our emergency department on 11/1/2024.  She may return to work on 11/02/2024.       If you have any questions or concerns, please don't hesitate to call.      Roxy Moore MD

## 2024-11-01 NOTE — DISCHARGE INSTRUCTIONS
All the tests in the ER today look reassuring.  As we discussed, this may be some symptoms from your recent vaccination while your body makes antibodies, could also be worsened by not yet having food yet today.  I recommend increasing your fluid intake for the next couple of days, and up slowly when you do stand up, and symptoms should resolve.

## 2024-11-02 LAB
ATRIAL RATE - MUSE: 82 BPM
DIASTOLIC BLOOD PRESSURE - MUSE: NORMAL MMHG
INTERPRETATION ECG - MUSE: NORMAL
P AXIS - MUSE: 63 DEGREES
PR INTERVAL - MUSE: 150 MS
QRS DURATION - MUSE: 84 MS
QT - MUSE: 376 MS
QTC - MUSE: 439 MS
R AXIS - MUSE: 79 DEGREES
SYSTOLIC BLOOD PRESSURE - MUSE: NORMAL MMHG
T AXIS - MUSE: 53 DEGREES
VENTRICULAR RATE- MUSE: 82 BPM

## 2024-11-04 NOTE — NURSING NOTE
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  Provider completed and sent to HR per request.  Ivet Espinal LPN

## 2024-11-05 DIAGNOSIS — F31.81 BIPOLAR 2 DISORDER (H): ICD-10-CM

## 2024-11-08 RX ORDER — PROPRANOLOL HYDROCHLORIDE 10 MG/1
10 TABLET ORAL 2 TIMES DAILY PRN
Qty: 60 TABLET | Refills: 0 | Status: SHIPPED | OUTPATIENT
Start: 2024-11-08

## 2024-11-08 NOTE — TELEPHONE ENCOUNTER
"Date of Last Office Visit:   9/3/2024  [facility]    Yari Dangelo MD  Psychiatry       RTC: 4 weeks  No shows: 0  Cancellations: 0  Date of Next Office Visit:    12/3/2024 9:30 AM (30 min)  [place]   Arrive by:  9:15 AM   BRIEF CARE RETURN    URPSY ([facility])   Yari Dangelo MD     ------------------------------    Medication requested:     Disp Refills Start End TRINO   propranolol (INDERAL) 10 MG tablet 60 tablet 1 8/6/2024 -- No   Sig - Route: Take 1 tablet (10 mg) by mouth 2 times daily as needed (for performance anxiety or panic) - Oral     ------------------------------  From last visit note:   \"- May take propranolol 10mg up to twice daily as needed for public speaking/panic \"     Refill Decision:    [x]Medication refilled per  Medication Refill in Ambulatory Care  policy.         [] Supervision: no future appointment scheduled. Scheduling has been notified to contact the pt for appointment.    []Medication unable to be refilled by RN due to criteria not met as indicated below:          [] Eligibility - Pt not seen within past 12 months, no future appointment       [] Supervision: no future appointment scheduled. Scheduling has been notified to contact the pt for appointment.       [] Compliance - lapse in therapy/gap in refills; No Shows; Cancellations       [] Verification - order discrepancy, clarification needed, modification needed       [] Sharona refills given. Pt did not follow-up, pended to care team for decision       [] Controlled medication       [] Medication not included in refill protocol policy       [] Medication is Reported/Historical       [] Medication not active on Pt's med list       [] > 30-day supply request       [] Advanced refill request: > 7 days before refill date       [] Review is needed: new med; med adjusted <= 30 days; Safety Alert; requires lab monitoring       [] Last visit treatment plan \"Open/Pended/Unsigned\" - routing " for review.       [] OTHER:

## 2024-11-12 ENCOUNTER — VIRTUAL VISIT (OUTPATIENT)
Dept: ENDOCRINOLOGY | Facility: CLINIC | Age: 29
End: 2024-11-12
Payer: COMMERCIAL

## 2024-11-12 VITALS — HEIGHT: 61 IN | BODY MASS INDEX: 22.66 KG/M2 | WEIGHT: 120 LBS

## 2024-11-12 DIAGNOSIS — E66.811 CLASS 1 OBESITY WITHOUT SERIOUS COMORBIDITY WITH BODY MASS INDEX (BMI) OF 33.0 TO 33.9 IN ADULT, UNSPECIFIED OBESITY TYPE: ICD-10-CM

## 2024-11-12 PROCEDURE — 99214 OFFICE O/P EST MOD 30 MIN: CPT | Mod: 95

## 2024-11-12 PROCEDURE — G2211 COMPLEX E/M VISIT ADD ON: HCPCS | Mod: 95

## 2024-11-12 RX ORDER — PHENTERMINE HYDROCHLORIDE 15 MG/1
15 CAPSULE ORAL EVERY MORNING
Qty: 30 CAPSULE | Refills: 3 | Status: SHIPPED | OUTPATIENT
Start: 2024-11-12

## 2024-11-12 RX ORDER — TOPIRAMATE 25 MG/1
TABLET, FILM COATED ORAL
Qty: 90 TABLET | Refills: 3 | Status: SHIPPED | OUTPATIENT
Start: 2024-11-12

## 2024-11-12 ASSESSMENT — PAIN SCALES - GENERAL: PAINLEVEL_OUTOF10: MILD PAIN (3)

## 2024-11-12 NOTE — PROGRESS NOTES
Virtual Visit Details    Type of service:  Video Visit     Originating Location (pt. Location): Home    Distant Location (provider location):  Off-site  Platform used for Video Visit: [facility] Medical Weight Management Note     Cindy Contreras  MRN:  [id number]  :  1995  ROBERTO:  2024    Dear ABILIO Lima CNP,    I had the pleasure of seeing your patient Cindy Contreras. She is a 29 year old adult who I am continuing to see for treatment of obesity related to:        2023     1:56 PM   --   I have the following health issues associated with obesity None of the above   I have the following symptoms associated with obesity Depression    Back Pain    Fatigue       Assessment & Plan   Problem List Items Addressed This Visit    None  Visit Diagnoses       Class 1 obesity without serious comorbidity with body mass index (BMI) of 33.0 to 33.9 in adult, unspecified obesity type        Relevant Medications    topiramate (TOPAMAX) 25 MG tablet    phentermine 15 MG capsule    Other Relevant Orders    Adult Bariatrics and Weight Management Clinic Follow-Up Order           Continue Phentermine 15mg in the morning. Refills sent.   Continue Topiramate 75mg at night. Refills sent.   Activity goal - start going back to the gym   Kate Figueroa in 4 months     INTERVAL HISTORY:  [facility] - 2023   GLP-1 not covered by insurance   Started Topiramate with approval from psychiatrist   Can start Phentermine in the future if needed - approved by psychiatrist   Insulin Resistance - CHANDNI-IR score 5.8    23 - restarted topiramate, was off for 3 weeks due to increase in depression symptoms    2023 - continued topiramate, started Phentermine - approved by psychiatrist with hx of Bipolar II  Episode of hypomania 2024. Reached out to psychiatry team and who agreed it was ok for her to continue phentermine with close monitoring.         Is at goal weight, but now feels like they could lose a little bit  more. Does not feel like they are at their healthiest. But realizes this might be more mental at times.     Was seen in the ER last week due to dizziness. Work up was negative. Thinks it might be due to spirolactone - has had these symptoms off and on since starting, and then increased with change in timing of medication.     Anti-obesity medication history    Current:   Phentermine 15mg + Topiramate 75mg - no side effects. Has been helpful with weight maintenance. Would like to continue on these medications or possibly dose increase today. No long episodes of hypomania since last visit.       Recent diet changes: Eating 2 meals a day, with minimal snacking. Has been feeling a little more hunger and larger portion sizes. Drinking around 4 water bottles daily.   Breakfast - skips   Lunch - Salad, sandwich - will get food out if at work (cafe zupa or panera)  Dinner - Protein + vegetable + carb  Snacks - popcorn    Recent exercise/activity changes: Has not been as active. Just moved, and old apartment complex had a gym. Now, looking to get a gym membership.     Recent stressors: Increase stress at work, but is doing ok overall. Was possibly going to move [place] to be with sister and mother, but decided not too. Has started DBT since last visit - group and individual.     Recent sleep changes: Stable, no concerns         CURRENT WEIGHT:   120 lbs 0 oz    Initial Weight (lbs): 175 lbs     Cumulative weight loss (lbs): 55  Weight Loss Percentage: 31.43%    Wt Readings from Last 5 Encounters:   11/12/24 54.4 kg (120 lb)   11/01/24 54.5 kg (120 lb 3.2 oz)   10/30/24 54 kg (119 lb)   10/15/24 53.5 kg (118 lb)   09/03/24 53.1 kg (117 lb)             11/12/2024     8:35 AM   Changes and Difficulties   I have made the following changes to my diet since my last visit: no   With regards to my diet, I am still struggling with: eating healthy food, consistent   I have made the following changes to my activity/exercise since my last  "visit: no   With regards to my activity/exercise, I am still struggling with: no         MEDICATIONS:   Current Outpatient Medications   Medication Sig Dispense Refill    phentermine 15 MG capsule Take 1 capsule (15 mg) by mouth every morning. 30 capsule 3    topiramate (TOPAMAX) 25 MG tablet Take  75mg at bedtime 90 tablet 3    ARIPiprazole (ABILIFY) 5 MG tablet Take 1 tablet (5 mg) by mouth daily. 30 tablet 1    azelastine (ASTELIN) 0.1 % nasal spray Spray 2 sprays into both nostrils daily.      cyclobenzaprine (FLEXERIL) 5 MG tablet Take 1 tablet (5 mg) by mouth 3 times daily as needed for muscle spasms. 30 tablet 0    loratadine (CLARITIN) 10 MG tablet Take 10 mg by mouth daily as needed for allergies.      methocarbamol (ROBAXIN) 500 MG tablet Take 1-2 tablets (500-1,000 mg) by mouth at bedtime. 30 tablet 1    multivitamin w/minerals (MULTI-VITAMIN) tablet Take 1 tablet by mouth daily      norgestimate-ethinyl estradiol (SUN) 0.25-35 MG-MCG tablet Take 1 tablet by mouth daily. 84 tablet 3    propranolol (INDERAL) 10 MG tablet Take 1 tablet (10 mg) by mouth 2 times daily as needed (for performance anxiety or panic). 60 tablet 0    spironolactone (ALDACTONE) 100 MG tablet Take 1 tablet (100 mg) by mouth daily. 90 tablet 3           11/12/2024     8:35 AM   Weight Loss Medication History Reviewed With Patient   Which weight loss medications are you currently taking on a regular basis? Phentermine    Topamax (topiramate)   Are you having any side effects from the weight loss medication that we have prescribed you? Yes   If you are having side effects please describe: dizzy           Objective    Ht 1.549 m (5' 1\")   Wt 54.4 kg (120 lb)   LMP 10/06/2024 (Approximate)   BMI 22.67 kg/m      Vitals - Patient Reported  Pain Score: Mild Pain (3)  Pain Loc: Upper Back      PHYSICAL EXAM:    GENERAL: alert and no distress  EYES: Eyes grossly normal to inspection.  No discharge or erythema, or obvious " scleral/conjunctival abnormalities.  RESP: No audible wheeze, cough, or visible cyanosis.    SKIN: Visible skin clear. No significant rash, abnormal pigmentation or lesions.  NEURO: Cranial nerves grossly intact.  Mentation and speech appropriate for age.  PSYCH: Appropriate affect, tone, and pace of words        Sincerely,    Kate Rasheed PA-C      31 minutes spent by me on the date of the encounter doing chart review, history and exam, documentation and further activities per the note    The longitudinal plan of care for the diagnosis(es)/condition(s) as documented were addressed during this visit. Due to the added complexity in care, I will continue to support Gerson in the subsequent management and with ongoing continuity of care.

## 2024-11-12 NOTE — PROGRESS NOTES
"Virtual Visit Details    Type of service:  Video Visit     Originating Location (pt. Location): {video visit patient location:175401::\"Home\"}  {PROVIDER LOCATION On-site should be selected for visits conducted from your clinic location or adjoining [facility] hospital, academic office, or other nearby [facility] building. Off-site should be selected for all other provider locations, including home:813032}  Distant Location (provider location):  {virtual location provider:915981}  Platform used for Video Visit: {Virtual Visit Platforms:574703::\"Taigen\"}  "

## 2024-11-12 NOTE — NURSING NOTE
Current patient location:     Is the patient currently in the state of [place]? YES    Visit mode:VIDEO    If the visit is dropped, the patient can be reconnected by: VIDEO VISIT: Text to cell phone:   Telephone Information:   Mobile [phone]       Will anyone else be joining the visit? NO  (If patient encounters technical issues they should call [phone] :[id number])    Are changes needed to the allergy or medication list? Pt stated no changes to allergies and Pt stated no med changes    Are refills needed on medications prescribed by this physician? Discuss with provider    Rooming Documentation:  Questionnaire(s) completed      Reason for visit: RECHECK    Wt other than 24 hrs:    Pain more than one location:  no  Arlin HARRIS

## 2024-11-12 NOTE — LETTER
2024       RE: Cindy Contreras  [address]  [place] [address]     Dear Colleague,    Thank you for referring your patient, Cindy Contreras, to the [facility]. Please see a copy of my visit note below.    Virtual Visit Details    Type of service:  Video Visit     Originating Location (pt. Location): Home    Distant Location (provider location):  Off-site  Platform used for Video Visit: [facility]      [facility] Weight Management Note     Cindy Contreras  MRN:  [id number]  :  1995  ROBERTO:  2024    Dear Siobhan Covington, ABILIO CNP,    I had the pleasure of seeing your patient Cindy Contreras. She is a 29 year old adult who I am continuing to see for treatment of obesity related to:        2023     1:56 PM   --   I have the following health issues associated with obesity None of the above   I have the following symptoms associated with obesity Depression    Back Pain    Fatigue       Assessment & Plan  Problem List Items Addressed This Visit    None  Visit Diagnoses       Class 1 obesity without serious comorbidity with body mass index (BMI) of 33.0 to 33.9 in adult, unspecified obesity type        Relevant Medications    topiramate (TOPAMAX) 25 MG tablet    phentermine 15 MG capsule    Other Relevant Orders    Adult Bariatrics and Weight Management Clinic Follow-Up Order           Continue Phentermine 15mg in the morning. Refills sent.   Continue Topiramate 75mg at night. Refills sent.   Activity goal - start going back to the gym   Kate Figueroa in 4 months     INTERVAL HISTORY:  [facility] - 2023   GLP-1 not covered by insurance   Started Topiramate with approval from psychiatrist   Can start Phentermine in the future if needed - approved by psychiatrist   Insulin Resistance - CHANDNI-IR score 5.8    23 - restarted topiramate, was off for 3 weeks due to increase in depression symptoms     8/14/2023 - continued topiramate, started Phentermine - approved by psychiatrist with hx of Bipolar II  Episode of hypomania 1/2024. Reached out to psychiatry team and who agreed it was ok for her to continue phentermine with close monitoring.         Is at goal weight, but now feels like they could lose a little bit more. Does not feel like they are at their healthiest. But realizes this might be more mental at times.     Was seen in the ER last week due to dizziness. Work up was negative. Thinks it might be due to spirolactone - has had these symptoms off and on since starting, and then increased with change in timing of medication.     Anti-obesity medication history    Current:   Phentermine 15mg + Topiramate 75mg - no side effects. Has been helpful with weight maintenance. Would like to continue on these medications or possibly dose increase today. No long episodes of hypomania since last visit.       Recent diet changes: Eating 2 meals a day, with minimal snacking. Has been feeling a little more hunger and larger portion sizes. Drinking around 4 water bottles daily.   Breakfast - skips   Lunch - Salad, sandwich - will get food out if at work ([employer] zupa or panera)  Dinner - Protein + vegetable + carb  Snacks - popcorn    Recent exercise/activity changes: Has not been as active. Just moved, and old apartment complex had a gym. Now, looking to get a gym membership.     Recent stressors: Increase stress at work, but is doing ok overall. Was possibly going to move [place] to be with sister and mother, but decided not too. Has started DBT since last visit - group and individual.     Recent sleep changes: Stable, no concerns         CURRENT WEIGHT:   120 lbs 0 oz    Initial Weight (lbs): 175 lbs     Cumulative weight loss (lbs): 55  Weight Loss Percentage: 31.43%    Wt Readings from Last 5 Encounters:   11/12/24 54.4 kg (120 lb)   11/01/24 54.5 kg (120 lb 3.2 oz)   10/30/24 54 kg (119 lb)   10/15/24 53.5 kg (118 lb)    09/03/24 53.1 kg (117 lb)             11/12/2024     8:35 AM   Changes and Difficulties   I have made the following changes to my diet since my last visit: no   With regards to my diet, I am still struggling with: eating healthy food, consistent   I have made the following changes to my activity/exercise since my last visit: no   With regards to my activity/exercise, I am still struggling with: no         MEDICATIONS:   Current Outpatient Medications   Medication Sig Dispense Refill     phentermine 15 MG capsule Take 1 capsule (15 mg) by mouth every morning. 30 capsule 3     topiramate (TOPAMAX) 25 MG tablet Take  75mg at bedtime 90 tablet 3     ARIPiprazole (ABILIFY) 5 MG tablet Take 1 tablet (5 mg) by mouth daily. 30 tablet 1     azelastine (ASTELIN) 0.1 % nasal spray Spray 2 sprays into both nostrils daily.       cyclobenzaprine (FLEXERIL) 5 MG tablet Take 1 tablet (5 mg) by mouth 3 times daily as needed for muscle spasms. 30 tablet 0     loratadine (CLARITIN) 10 MG tablet Take 10 mg by mouth daily as needed for allergies.       methocarbamol (ROBAXIN) 500 MG tablet Take 1-2 tablets (500-1,000 mg) by mouth at bedtime. 30 tablet 1     multivitamin w/minerals (MULTI-VITAMIN) tablet Take 1 tablet by mouth daily       norgestimate-ethinyl estradiol (SUN) 0.25-35 MG-MCG tablet Take 1 tablet by mouth daily. 84 tablet 3     propranolol (INDERAL) 10 MG tablet Take 1 tablet (10 mg) by mouth 2 times daily as needed (for performance anxiety or panic). 60 tablet 0     spironolactone (ALDACTONE) 100 MG tablet Take 1 tablet (100 mg) by mouth daily. 90 tablet 3           11/12/2024     8:35 AM   Weight Loss Medication History Reviewed With Patient   Which weight loss medications are you currently taking on a regular basis? Phentermine    Topamax (topiramate)   Are you having any side effects from the weight loss medication that we have prescribed you? Yes   If you are having side effects please describe: dizzy  "          Objective   Ht 1.549 m (5' 1\")   Wt 54.4 kg (120 lb)   LMP 10/06/2024 (Approximate)   BMI 22.67 kg/m      Vitals - Patient Reported  Pain Score: Mild Pain (3)  Pain Loc: Upper Back      PHYSICAL EXAM:    GENERAL: alert and no distress  EYES: Eyes grossly normal to inspection.  No discharge or erythema, or obvious scleral/conjunctival abnormalities.  RESP: No audible wheeze, cough, or visible cyanosis.    SKIN: Visible skin clear. No significant rash, abnormal pigmentation or lesions.  NEURO: Cranial nerves grossly intact.  Mentation and speech appropriate for age.  PSYCH: Appropriate affect, tone, and pace of words        Sincerely,    Kate Rasheed PA-C      31 minutes spent by me on the date of the encounter doing chart review, history and exam, documentation and further activities per the note    The longitudinal plan of care for the diagnosis(es)/condition(s) as documented were addressed during this visit. Due to the added complexity in care, I will continue to support Gerson in the subsequent management and with ongoing continuity of care.      Again, thank you for allowing me to participate in the care of your patient.      Sincerely,    Kate Rasheed PA-C    "

## 2024-11-21 ENCOUNTER — TELEPHONE (OUTPATIENT)
Dept: ENDOCRINOLOGY | Facility: CLINIC | Age: 29
End: 2024-11-21
Payer: COMMERCIAL

## 2024-11-21 NOTE — TELEPHONE ENCOUNTER
Left Voicemail (1st Attempt) and Sent [employer] (1st Attempt) for the patient to call back and schedule the following:    Appointment type: Return Weight Management  Appointment mode: In-Person or Virtual Visit  Provider: Kate Rasheed  Return date: Approx. 3/12/25  Specialty phone number: [phone]

## 2024-12-03 ENCOUNTER — VIRTUAL VISIT (OUTPATIENT)
Dept: PSYCHIATRY | Facility: CLINIC | Age: 29
End: 2024-12-03
Attending: PSYCHIATRY & NEUROLOGY
Payer: COMMERCIAL

## 2024-12-03 DIAGNOSIS — L70.0 ACNE VULGARIS: Primary | ICD-10-CM

## 2024-12-03 DIAGNOSIS — F31.81 BIPOLAR 2 DISORDER (H): Primary | ICD-10-CM

## 2024-12-03 DIAGNOSIS — F60.3 BORDERLINE PERSONALITY DISORDER (H): ICD-10-CM

## 2024-12-03 PROCEDURE — G2211 COMPLEX E/M VISIT ADD ON: HCPCS | Mod: 95

## 2024-12-03 PROCEDURE — 99214 OFFICE O/P EST MOD 30 MIN: CPT | Mod: 95

## 2024-12-03 RX ORDER — GUANFACINE 1 MG/1
1 TABLET, EXTENDED RELEASE ORAL AT BEDTIME
Qty: 30 TABLET | Refills: 1 | Status: CANCELLED | OUTPATIENT
Start: 2024-12-03

## 2024-12-03 RX ORDER — SPIRONOLACTONE 25 MG/1
25 TABLET ORAL DAILY
Qty: 30 TABLET | Refills: 2 | Status: SHIPPED | OUTPATIENT
Start: 2024-12-03

## 2024-12-03 ASSESSMENT — PAIN SCALES - GENERAL: PAINLEVEL_OUTOF10: MODERATE PAIN (4)

## 2024-12-03 NOTE — PATIENT INSTRUCTIONS
Treatment plan:  Medications:   - start guanfacine ER (Intuniv) 1mg at bedtime  - continue Abilify 5 mg daily  - continue propranolol 10mg up to twice daily as needed for public speaking/panic     Therapy:  - Continue DBT       **For crisis resources, please see the information at the end of this document**   Patient Education    Thank you for coming to the [facility] [place] [facility].     Lab Testing:  If you had lab testing today and your results are reassuring or normal they will be mailed to you or sent through [employer] within 7 days. If the lab tests need quick action we will call you with the results. The phone number we will call with results is # [phone]. If this is not the best number please call our clinic and change the number.     Medication Refills:  If you need any refills please call your pharmacy and they will contact us. Our fax number for refills is [phone].   Three business days of notice are needed for general medication refill requests.   Five business days of notice are needed for controlled substance refill requests.   If you need to change to a different pharmacy, please contact the new pharmacy directly. The new pharmacy will help you get your medications transferred.     Contact Us:  Please call [phone] during business hours (8-5:00 M-F).   If you have medication related questions after clinic hours, or on the weekend, please call [phone].     Financial Assistance [phone]   Medical Records [phone]       MENTAL HEALTH CRISIS RESOURCES:  For a emergency help, please call 911 or go to the nearest Emergency Department.     Emergency Walk-In Options:   EmPATH Unit @ [facility] ([place]): [phone] - Specialized mental health emergency area designed to be calming  [facility] ([place]): [phone]  [facility] ([place]): [phone]  [facility]):  [phone]    [facility] Crisis Information:   [place]: [phone]  [place]: [phone]  [place] ([place]) - Adult: [phone]     Child: [phone]  [place] - Adult: [phone]     Child: [phone]  [place]: [phone]  List of all [facility] resources:   https://[place].gov/dhs/people-we-serve/adults/health-care/mental-health/resources/crisis-contacts.jsp    National Crisis Information:   Crisis Text Line: Text  [place]  to 641594  Suicide & Crisis Lifeline: 988  National Suicide Prevention Lifeline: 9-270-179-TALK (1-277.327.6635)       For online chat options, visit https://suicidepreventionlifeline.org/chat/  Poison Control Center: 1-110.365.7046  Trans Lifeline: 1-223.231.1748 - Hotline for transgender people of all ages  [employer]: 5-675-224-0956 - Hotline for LGBT youth     For Non-Emergency Support:   Fast Tracker: Mental Health & Substance Use Disorder Resources -   https://www.[employer]en.org/

## 2024-12-03 NOTE — PROGRESS NOTES
Virtual Visit Details    Type of service:  Video Visit     Start: 0931  End: 1003    Originating Location (pt. Location): Home  {PROVIDER LOCATION On-site should be selected for visits conducted from your clinic location or adjoining [facility] hospital, academic office, or other nearby [facility] building. Off-site should be selected for all other provider locations, including home:[id number]}  Distant Location (provider location):  On-site  Platform used for Video Visit: [employer]

## 2024-12-03 NOTE — PROGRESS NOTES
[facility]  Brief Care Team  MEDICAL PROGRESS NOTE       CARE TEAM:    PCP- Siobhan Covington  Therapist- Marycruz Degroot at [facility]     Gerson is a 29 year old who uses the pronouns she/her/hers, they/them/theirs, with history of bipolar 2 disorder, BPD and generalized anxiety disorder.                 Assessment & Plan     Bipolar 2 disorder  Borderline personality disorder    Today, Gerson reports that she has recently been experiencing increased irritability, though overall her mood has been more manageable since starting DBT.  She has continued to take Abilify regularly and finds propranolol very helpful to use as needed PRN for work situations. Discussed phentermine/topiramate potential effects on irritability, though she notes that these medications have been consistent for quite a long time while the irritability is a newer symptom. Reviewed option to start guanfacine (Intuniv) to help target irritability and discussed additive risk for lowering blood pressure/dizziness in combination with spironolactone that is being prescribed for acne, as well as phentermine for weight loss, and PRN propranolol. Discussed lowering spironolactone to limit effects on blood pressure-this was coordinated with her primary care provider who did decrease dosage to 25 mg daily.    Psychotropic Drug Interactions:    Blood pressure lowering agents: abilify + propranolol  Seizure-threshold lowering potential: abilify + phentermine  CNS depressant: abilify +topiramate  Management: routine monitoring    Risk Statements:   Treatment Risk: Risks, benefits, alternatives and potential adverse effects have been discussed and are understood.   Safety Risk: Gerson did not appear to be an imminent safety risk to self or others.    PLAN    1) Medications:   - start guanfacine ER (Intuniv) 1 mg at bedtime   - continue Abilify 5 mg daily  - continue propranolol 10mg daily as needed  for public speaking/panic    Other prescriptions from PCP/weight loss clinic:  -  phentermine   -  topiramate   -  spironolactone    2) Psychotherapy: continue DBT    3) Next due:  Labs: [facility] labs 08/24: hemoglobin A1c: 5.8, prediabetes range; lipid panel: cholesterol elevated 216-, both increased since last year, discussed this with pt  EKG- No routine monitoring indicated for current psychotropic regimen  Rating scales- AIMS: Determine next due    4) Referrals: none    5) Follow-up: Return to clinic in 5 weeks                 Interval History   - Updates: did start DBT   -Mood: hasn't noticed a change in mood since starting DBT but learning a lot of new skills and tools   -Depression: usually accompanied by a trigger, reactivity to something, not persistent   -Sleep: okay   -Appetite/eating: depends on the day  -Anxiety: high anxiety/irritability - sometimes a trigger and hard to get over it/inability to regulate emotions causes irritability  -hypomania- one episode a couple months ago, restlessness, increased irritability, jittery/on edge, wasn't sleeping much, lasted a couple days  -SI: not recently, maybe a month ago, denied any plan or intent   - no self harm recently    -Psychosis: a little bit of paranoia re people talking about her or planning something, conspiring against her, not worried about safety, no hallucinations   -Substances: decreased cannabis   -Therapy: started [facility] at [facility], on 4th week of group but has been doing individual therapy for a month before   -Medications: on OCP, wants an appointment to get tubes tied, propranolol has been helpful for meetings and having to speak, only uses once a day. For abilify, has been taking 5 mg daily, been awhile now, a few months, been tolerating well, hasn't noticed anything. Would be helpful to take something for irritability.     Wants to stay on phentermine/topiramate for weight maintenance     Current Social  History:  Financial/occupational:  Manager at [employer]   Living situation: independently in apartment   Social/spiritual support:  it's fine, has good friends and close with family       Pertinent Substance Use:  [Last updated 09/02/24]  Alcohol: Yes: socially occasionally   Cannabis: edibles once month   Tobacco: Yes: vapes daily   Caffeine:  Yes: daily 2 cups of coffee   Opioids: No   Narcan Kit current: N/A    Contraception: Yes: on pill                 Summary Points of Current Care  09/2024: continues to have difficulty with low mood, increased abilify to 5mg daily. Continue propranolol PRN public speaking, continue to also recommend DBT  12/2024: Added Intuniv for increased irritability, coordinated with PCP who decreased spironolactone in conjunction                  Physical Exam  (Vitals Only)    LMP 10/06/2024 (Approximate)   Pulse Readings from Last 3 Encounters:   11/01/24 76   10/30/24 92   10/28/24 92     Wt Readings from Last 3 Encounters:   11/12/24 54.4 kg (120 lb)   11/01/24 54.5 kg (120 lb 3.2 oz)   10/30/24 54 kg (119 lb)     BP Readings from Last 3 Encounters:   11/01/24 105/68   10/30/24 95/66   10/28/24 118/86                      Mental Status Exam    Alertness: alert  and oriented  Appearance: casually groomed  Behavior/Demeanor: cooperative and calm, with good  eye contact   Speech: normal and regular rate and rhythm  Language: intact and no problems  Psychomotor: normal or unremarkable  Mood: depressed  Affect: restricted; congruent to: mood- yes, content- yes  Thought Process/Associations: unremarkable  Thought Content:  Reports none;  Denies suicidal ideation  Perception:  Reports none;  Denies hallucinations  Insight: good  Judgment: good  Cognition: does  appear grossly intact; formal cognitive testing was not done  Gait and Station: N/A (telehealth)                  Past Psychotropic Medication Trials    Medication Max Dose (mg) Dates / Duration Helpful? DC Reason / Adverse  Effects?   Zoloft       Triggered hypomania   Abilify 2.5   Y     Lexapro 5   Y     Lamotrigine 200  1/2024 N                      Past Medical History     Patient Active Problem List   Diagnosis    Acne    Bipolar 2 disorder (H)    Environmental allergies    Chronic bilateral thoracic back pain    Chronic pain of both shoulders    Neck pain                     Medications     Current Outpatient Medications   Medication Sig Dispense Refill    ARIPiprazole (ABILIFY) 5 MG tablet Take 1 tablet (5 mg) by mouth daily. 30 tablet 1    azelastine (ASTELIN) 0.1 % nasal spray Spray 2 sprays into both nostrils daily.      cyclobenzaprine (FLEXERIL) 5 MG tablet Take 1 tablet (5 mg) by mouth 3 times daily as needed for muscle spasms. 30 tablet 0    loratadine (CLARITIN) 10 MG tablet Take 10 mg by mouth daily as needed for allergies.      methocarbamol (ROBAXIN) 500 MG tablet Take 1-2 tablets (500-1,000 mg) by mouth at bedtime. 30 tablet 1    multivitamin w/minerals (MULTI-VITAMIN) tablet Take 1 tablet by mouth daily      norgestimate-ethinyl estradiol (SUN) 0.25-35 MG-MCG tablet Take 1 tablet by mouth daily. 84 tablet 3    phentermine 15 MG capsule Take 1 capsule (15 mg) by mouth every morning. 30 capsule 3    propranolol (INDERAL) 10 MG tablet Take 1 tablet (10 mg) by mouth 2 times daily as needed (for performance anxiety or panic). 60 tablet 0    spironolactone (ALDACTONE) 100 MG tablet Take 1 tablet (100 mg) by mouth daily. 90 tablet 3    topiramate (TOPAMAX) 25 MG tablet Take  75mg at bedtime 90 tablet 3                   Data         5/22/2024     8:21 AM 6/5/2024     3:52 PM 11/12/2024     8:36 AM   PROMIS-10 Total Score w/o Sub Scores   PROMIS TOTAL - SUBSCORES 26 22 28        Patient-reported         9/3/2024     9:55 AM 10/7/2024    10:47 AM 12/3/2024     9:24 AM   PHQ-9 SCORE   PHQ-9 Total Score MyChart  10 (Moderate depression) 7 (Mild depression)   PHQ-9 Total Score 22 10 7        Patient-reported         1/24/2024      9:41 AM 2/13/2024     8:00 AM 6/5/2024     3:51 PM   BLANCA-7 SCORE   Total Score 14 (moderate anxiety) 20 (severe anxiety) 20 (severe anxiety)   Total Score 14    14 20 20       Liver/Kidney Function, TSH Metabolic Blood counts   Recent Labs   Lab Test 11/01/24 0942 08/06/24 0926   AST  --  18   ALT  --  22   ALKPHOS  --  50   CR 0.80 0.80     Recent Labs   Lab Test 11/01/24 0942   TSH 1.30    Recent Labs   Lab Test 08/06/24 0926   CHOL 216*   TRIG 114   *   HDL 63     Recent Labs   Lab Test 08/06/24 0926   A1C 5.8*     Recent Labs   Lab Test 11/01/24 0942   GLC 91    Recent Labs   Lab Test 11/01/24 0942   WBC 7.4   HGB 13.6   HCT 40.7   MCV 89              Level of Medical Decision Making:   - At least 1 chronic problem that is not stable  - Engaged in prescription drug management during visit (discussed any medication benefits, side effects, alternatives, etc.)     The longitudinal plan of care for the diagnosis(es)/condition(s) as documented were addressed during this visit. Due to the added complexity in care, I will continue to support Gerson in the subsequent management and with ongoing continuity of care.    Treatment        PROVIDER: Yari Dangelo MD    Patient staffed in clinic with Dr. Glaser who will sign the note.  Supervisor is Dr. Wilhelm.

## 2024-12-03 NOTE — NURSING NOTE
Current patient location:  friends house     Is the patient currently in the state of [place]? YES    Visit mode:VIDEO    If the visit is dropped, the patient can be reconnected by:VIDEO VISIT: Text to cell phone:   Telephone Information:   Mobile [phone]       Will anyone else be joining the visit? NO  (If patient encounters technical issues they should call [phone])    Are changes needed to the allergy or medication list? No    Are refills needed on medications prescribed by this physician? NO    Rooming Documentation:  Questionnaire(s) completed    Reason for visit: RECHECK    Drew HARRIS

## 2024-12-04 DIAGNOSIS — M54.6 CHRONIC BILATERAL THORACIC BACK PAIN: ICD-10-CM

## 2024-12-04 DIAGNOSIS — G89.29 CHRONIC BILATERAL THORACIC BACK PAIN: ICD-10-CM

## 2024-12-04 DIAGNOSIS — M54.2 NECK PAIN: ICD-10-CM

## 2024-12-10 RX ORDER — ARIPIPRAZOLE 5 MG/1
5 TABLET ORAL DAILY
Qty: 30 TABLET | Refills: 1 | Status: SHIPPED | OUTPATIENT
Start: 2024-12-10

## 2024-12-10 RX ORDER — GUANFACINE 1 MG/1
1 TABLET, EXTENDED RELEASE ORAL AT BEDTIME
Qty: 30 TABLET | Refills: 0 | Status: SHIPPED | OUTPATIENT
Start: 2024-12-10

## 2024-12-10 RX ORDER — PROPRANOLOL HYDROCHLORIDE 10 MG/1
10 TABLET ORAL DAILY PRN
Qty: 30 TABLET | Refills: 1 | Status: SHIPPED | OUTPATIENT
Start: 2024-12-10

## 2025-01-12 DIAGNOSIS — F31.81 BIPOLAR 2 DISORDER (H): ICD-10-CM

## 2025-01-13 RX ORDER — PROPRANOLOL HYDROCHLORIDE 10 MG/1
10 TABLET ORAL DAILY PRN
Qty: 30 TABLET | Refills: 1 | OUTPATIENT
Start: 2025-01-13

## 2025-01-13 NOTE — TELEPHONE ENCOUNTER
"  Date of Last Office Visit: 12/3/2024  [facility]      RTC: 5wks  No shows: 0  Cancellations: 0  Date of Next Office Visit: 0  ------------------------------    Medication requested:     Disp Refills Start End TRINO   propranolol (INDERAL) 10 MG tablet 30 tablet 1 12/10/2024 -- No   Sig - Route: Take 1 tablet (10 mg) by mouth daily as needed (for performance anxiety or panic). - Oral     ------------------------------  From last visit note:   \"- continue propranolol 10mg daily as needed for public speaking/panic \"    Lapse in medication adherence greater than 5 days?:  N  Medication refill request verified as identical to current order?: Y    Refill Decision:      [x]Medication unable to be refilled by RN due to criteria not met as indicated below:          [] Eligibility - Pt not seen within past 12 months, no future appointment       [x] Supervision: no future appointment scheduled. Scheduling has been notified to contact the pt for appointment.       [] Compliance - lapse in therapy/gap in refills; No Shows; Cancellations       [] Verification - order discrepancy, clarification needed, modification needed       [] Sharona refills given. Pt did not follow-up, pended to care team for decision       [] Controlled medication       [] Medication not included in refill protocol policy       [] Medication is Reported/Historical       [] Medication not active on Pt's med list       [] > 30-day supply request       [] Advanced refill request: > 7 days before refill date       [] Review is needed: new med; med adjusted <= 30 days; Safety Alert; requires lab monitoring       [] Last visit treatment plan \"Open/Pended/Unsigned\" - routing for review.       [x] OTHER: request too soon, Last refill: 1/6/2025                        "

## 2025-01-24 ENCOUNTER — TRANSFERRED RECORDS (OUTPATIENT)
Dept: HEALTH INFORMATION MANAGEMENT | Facility: CLINIC | Age: 30
End: 2025-01-24
Payer: COMMERCIAL

## 2025-02-18 DIAGNOSIS — F31.81 BIPOLAR 2 DISORDER (H): ICD-10-CM

## 2025-02-18 RX ORDER — ARIPIPRAZOLE 5 MG/1
5 TABLET ORAL DAILY
Qty: 30 TABLET | Refills: 0 | Status: SHIPPED | OUTPATIENT
Start: 2025-02-18

## 2025-02-18 NOTE — TELEPHONE ENCOUNTER
Medication requested:  ARIPIPRAZOLE 5 MG TABLET   Last refilled: 12/10/24  ARIPiprazole (ABILIFY) 5 MG tablet 30 tablet 1 12/10/2024 -- No   Sig - Route: Take 1 tablet (5 mg) by mouth daily. - Oral     Qty: 30/1      Last seen:  12/3/2024  [facility]  RTC: 5 wks  No shows: 0  Cancellations: 0  Date of Next Office Visit: 0    Refill decision:   Refill pended and routed to the provider for review/determination due to Outside of time frame for return( 12/3/24> 5 weeks recommended). Scheduling was notified at previous request to contact for loy, and contacted patient via [employer].No future visits noted

## 2025-03-04 RX ORDER — METHOCARBAMOL 500 MG/1
500-1000 TABLET, FILM COATED ORAL AT BEDTIME
Qty: 30 TABLET | Refills: 1 | OUTPATIENT
Start: 2025-03-04

## 2025-04-07 DIAGNOSIS — E66.811 CLASS 1 OBESITY WITHOUT SERIOUS COMORBIDITY WITH BODY MASS INDEX (BMI) OF 33.0 TO 33.9 IN ADULT, UNSPECIFIED OBESITY TYPE: ICD-10-CM

## 2025-04-07 RX ORDER — PHENTERMINE HYDROCHLORIDE 15 MG/1
15 CAPSULE ORAL EVERY MORNING
Qty: 30 CAPSULE | Refills: 3 | Status: CANCELLED | OUTPATIENT
Start: 2025-04-07

## 2025-04-07 NOTE — TELEPHONE ENCOUNTER
Last Written Prescription:  phentermine 15 MG capsule 30 capsule 3 11/12/2024 -- No   Sig - Route: Take 1 capsule (15 mg) by mouth every morning. - Oral     ----------------------  Last Visit Date: 11-12-24  ( RTC 4 M)  Future Visit Date: 9-12-25  ----------------------    Wt Mgmt Protocol:   11-1-24 Cr  Creatinine  0.51 - 1.17 mg/dL 0.80     GFR Estimate  >60 mL/min/1.73m2 >90            Pulse: -- 76  as of 11/1/2024     BP: -- 105/68  as of 11/1/2024         Refill decision: Medication unable to be refilled by RN due to: Controlled medication  Next appt outside of RTC timeframe        Request from pharmacy:  Requested Prescriptions   Pending Prescriptions Disp Refills    phentermine 15 MG capsule 30 capsule 3     Sig: Take 1 capsule (15 mg) by mouth every morning.       Rx Protocol Controlled Substance Failed - 4/7/2025  1:36 PM        Failed - Visit with relevant provider in past 3 months or upcoming 3 months        Failed - Urine drug screeen results on file in past 12 months     [unfilled]           Failed - Pain Agreement on file in last 12 months     Please review last Controlled Substance Pain agreement document.   CSA -- Encounter Level:    CSA: None found at the encounter level.       CSA -- Patient Level:    CSA: None found at the patient level.               Failed - Auto Fail - Please forward to Provider        Passed - Medication is active on med list and the sig matches        Passed - Medication not refilled in past 28 days     Invalid Medication Grouper          Passed - No active pregnancy on record        Passed - No pregnancy test in past 12 months or most recent test was negative

## 2025-04-13 DIAGNOSIS — F31.81 BIPOLAR 2 DISORDER (H): ICD-10-CM

## 2025-04-14 RX ORDER — ARIPIPRAZOLE 5 MG/1
5 TABLET ORAL DAILY
Qty: 30 TABLET | Refills: 0 | Status: SHIPPED | OUTPATIENT
Start: 2025-04-14

## 2025-04-14 NOTE — TELEPHONE ENCOUNTER
"Date of Last Office Visit: 12/3/24  RTC: 5 weeks  No shows: x 1 on 4/4/25  Cancellations: 0  Date of Next Office Visit: 5/13/25  ------------------------------    Last Script Details::    ARIPiprazole (ABILIFY) 5 MG tablet 30 tablet 0 2/18/2025     ------------------------------  From last visit note:   \" continue aripiprazole (Abilify) 5 mg daily \"        Refill Decision:        [x]Medication unable to be refilled by RN due to criteria not met as indicated below:   Compliance - lapse in therapy/gap in refills; No Shows; Cancellations        Request from pharmacy:  Requested Prescriptions   Pending Prescriptions Disp Refills    ARIPiprazole (ABILIFY) 5 MG tablet [Pharmacy Med Name: ARIPIPRAZOLE 5 MG TABLET] 30 tablet 0     Sig: TAKE 1 TABLET (5 MG) BY MOUTH DAILY. PLEASE SCHEDULE AN APPOINTMENT FOR FUTURE REFILLS.       Antipsychotic Medications Passed - 4/14/2025  4:50 PM        Passed - Most recent blood pressure under 140/90 in past 12 months        Passed - Patient is 12 years of age or older        Passed - Lipid panel on file within the past 12 months     Recent Labs   Lab Test 08/06/24  0926   CHOL 216*   TRIG 114   HDL 63   *   NHDL 153*               Passed - Heart Rate on file within past 12 months     Pulse Readings from Last 3 Encounters:   11/01/24 76   10/30/24 92   10/28/24 92               Passed - A1c or Glucose on file in past 12 months     Recent Labs   Lab Test 11/01/24  0942 08/06/24  0926   GLC 91 88   A1C  --  5.8*       Please review patients last 3 weights. If a weight gain of >10 lbs exists, you may refill the prescription once after instructing the patient to schedule an appointment within the next 30 days.    Wt Readings from Last 3 Encounters:   11/12/24 54.4 kg (120 lb)   11/01/24 54.5 kg (120 lb 3.2 oz)   10/30/24 54 kg (119 lb)             Passed - Medication is active on med list and the sig matches. RN to manually verify dose and sig if red X/fail.     If the protocol passes " (green check), you do not need to verify med dose and sig.    A prescription matches if they are the same clinical intention.    For Example: once daily and every morning are the same.    The protocol can not identify upper and lower case letters as matching and will fail.     For Example: Take 1 tablet (50 mg) by mouth daily     TAKE 1 TABLET (50 MG) BY MOUTH DAILY    For all fails (red x), verify dose and sig.    If the refill does match what is on file, the RN can still proceed to approve the refill request.       If they do not match, route to the appropriate provider.             Passed - Recent (12 month) or future (90 days) visit with authorizing provider s specialty     The patient must have completed an in-person or virtual visit within the past 12 months or has a future visit scheduled within the next 90 days with the authorizing provider s specialty.  Urgent care and e-visits do not qualify as an office visit for this protocol.          Passed - Medication indicated for associated diagnosis     Medication is associated with one or more of the following diagnoses:             Agitation            Autistic disorder            Bipolar disorder            Chemotherapy-induced nausea and vomiting            Delirium            Depression            Schizophrenia             Mood disorder            Passed - Patient is not pregnant        Passed - No positve pregnancy test on file in past 12 months

## 2025-04-16 ENCOUNTER — MYC REFILL (OUTPATIENT)
Dept: ENDOCRINOLOGY | Facility: CLINIC | Age: 30
End: 2025-04-16
Payer: COMMERCIAL

## 2025-04-16 DIAGNOSIS — E66.811 CLASS 1 OBESITY WITHOUT SERIOUS COMORBIDITY WITH BODY MASS INDEX (BMI) OF 33.0 TO 33.9 IN ADULT, UNSPECIFIED OBESITY TYPE: ICD-10-CM

## 2025-04-17 ENCOUNTER — LAB (OUTPATIENT)
Dept: LAB | Facility: CLINIC | Age: 30
End: 2025-04-17
Attending: ADVANCED PRACTICE MIDWIFE
Payer: COMMERCIAL

## 2025-04-17 ENCOUNTER — OFFICE VISIT (OUTPATIENT)
Dept: OBGYN | Facility: CLINIC | Age: 30
End: 2025-04-17
Attending: ADVANCED PRACTICE MIDWIFE
Payer: COMMERCIAL

## 2025-04-17 VITALS
HEIGHT: 61 IN | BODY MASS INDEX: 22.67 KG/M2 | SYSTOLIC BLOOD PRESSURE: 99 MMHG | HEART RATE: 76 BPM | DIASTOLIC BLOOD PRESSURE: 67 MMHG

## 2025-04-17 DIAGNOSIS — Z30.013 ENCOUNTER FOR INITIAL PRESCRIPTION OF INJECTABLE CONTRACEPTIVE: Primary | ICD-10-CM

## 2025-04-17 DIAGNOSIS — Z11.3 SCREEN FOR STD (SEXUALLY TRANSMITTED DISEASE): ICD-10-CM

## 2025-04-17 PROBLEM — F60.3 BORDERLINE PERSONALITY DISORDER (H): Status: ACTIVE | Noted: 2025-04-17

## 2025-04-17 LAB
BACTERIAL VAGINOSIS VAG-IMP: NEGATIVE
CANDIDA DNA VAG QL NAA+PROBE: DETECTED
CANDIDA GLABRATA / CANDIDA KRUSEI DNA: NOT DETECTED
HBV SURFACE AG SERPL QL IA: NONREACTIVE
HCV AB SERPL QL IA: NONREACTIVE
HIV 1+2 AB+HIV1 P24 AG SERPL QL IA: NONREACTIVE
T PALLIDUM AB SER QL: NONREACTIVE
T VAGINALIS DNA VAG QL NAA+PROBE: NOT DETECTED

## 2025-04-17 PROCEDURE — 81515 NFCT DS BV&VAGINITIS DNA ALG: CPT | Performed by: ADVANCED PRACTICE MIDWIFE

## 2025-04-17 PROCEDURE — 36415 COLL VENOUS BLD VENIPUNCTURE: CPT

## 2025-04-17 PROCEDURE — 250N000011 HC RX IP 250 OP 636: Performed by: ADVANCED PRACTICE MIDWIFE

## 2025-04-17 PROCEDURE — 87340 HEPATITIS B SURFACE AG IA: CPT

## 2025-04-17 PROCEDURE — 96372 THER/PROPH/DIAG INJ SC/IM: CPT | Performed by: ADVANCED PRACTICE MIDWIFE

## 2025-04-17 PROCEDURE — 86780 TREPONEMA PALLIDUM: CPT

## 2025-04-17 PROCEDURE — 86803 HEPATITIS C AB TEST: CPT

## 2025-04-17 PROCEDURE — 99213 OFFICE O/P EST LOW 20 MIN: CPT | Performed by: ADVANCED PRACTICE MIDWIFE

## 2025-04-17 PROCEDURE — 87389 HIV-1 AG W/HIV-1&-2 AB AG IA: CPT

## 2025-04-17 RX ORDER — MEDROXYPROGESTERONE ACETATE 150 MG/ML
150 INJECTION, SUSPENSION INTRAMUSCULAR
Status: ACTIVE | OUTPATIENT
Start: 2025-04-17 | End: 2026-04-12

## 2025-04-17 RX ORDER — PHENTERMINE HYDROCHLORIDE 15 MG/1
15 CAPSULE ORAL EVERY MORNING
Qty: 30 CAPSULE | Refills: 3 | Status: SHIPPED | OUTPATIENT
Start: 2025-04-17

## 2025-04-17 RX ADMIN — MEDROXYPROGESTERONE ACETATE 150 MG: 150 INJECTION, SUSPENSION INTRAMUSCULAR at 08:50

## 2025-04-17 NOTE — NURSING NOTE

## 2025-04-17 NOTE — LETTER
"2025       RE: Cindy Contreras  [address]  [place] [address]     Dear Colleague,    Thank you for referring your patient, Cindy Contreras, to the [facility] [place] at [facility]. Please see a copy of my visit note below.    Gyn Clinic Visit Note  2025    S: Cindy Contreras is a 29 year old  here today for depo shot.  Patient currently on pills but doesn't always remember to take them, would like to go back to the depo shot.    Gyn Hx:   Patient's last menstrual period was 2025 (exact date). LMP 25.    Contraception:   OCPs now, previously: depo, nexplanon x2, IUD (which was \"too big for my uterus and sticking into my fallopian tubes\")  Sexual Activity  yes, single partner, contraception - oral contraceptives (combined)  Male  Sexually transmitted disease history:  Chlamydia and Gonorrhea    Patient recently lost 60lbs and would like to avoid weight gain.    Patient states that they are interested in sterilization but previously missed consult appointment at an outside clinic.      Contraception start -   Method interested in: depo or other injectable and tubal ligation              Methods used previously: pill:  and depo provera. Patient states she has tried \"everything but the nuva ring\" and is not interested in that  Problems with previous methods: YES  History of pregnancies:         Patient's last menstrual period was 2025 (exact date).         PAP: 23 NILM next due 2026  : 0  Para: 0  Menstrual cycle: Inconsistent on pill- continously cycles  Flow: a week long, not heavy  History of migraines: No  Smoker: YES- vape  Accompanying Signs & Symptoms:   Dysuria: No  Vaginal discharge: No  Painful intercourse: No  Precipitating and/or Alleviating factors:    Currently sexually active: YES  In stable relationship: YES  Desire STD testing: YES  Are you planning a pregnancy soon: No     " Reviewed all contraceptive options:    Method Efficacy How to use it What's good about it Other Considerations   Combined Oral Contraceptives 92-99% Take one pill everyday. During the days you take the placebo pill, you will get your menses *Can make periods more regular and less painful                               *Can improve PMS symptoms *Contains Estrogen and Progestin                                            *May cause spotting for the first few months                             *May cause nausea, weight gain, headaches. May be relieved by switching brands   Progestin-only pill (mini-pill) 92-99% Take a pill every day at the exact same time *Can be used while breastfeeding *May cause spotting                     *Need to be consistent   NuvaRing 92-99% Insert ring into the vagina, keep in for 3 weeks, remove ring for 4th week *Can make periods more regular and less painful                               *No pill to take daily *Contains Estrogen and Progestin                                            *May cause spotting for the first few months                             *Can increase vaginal discharge                            *Can be removed for IC but not necessary   Depo-Provera Shot 97-99% Get the shot every three months *Can be used while breastfeeding                                  *Each shot works for three months *May cause spotting, no period, weight gain, and may worsen depression                                        *May cause delay in getting pregnant after discontinuing   Nexplanon (the implant) 97-99% Provider places a thin plastic lilibeth under the skin of the upper arm *Effective for up to three years                                                    *Can be used while breastfeeding *Contains only Progestin                  *May cause irregular bleeding                            *After one year, many women do not have a period   Mirena IUD &Terri IUD (Progestin IUD) 99% Provider places  the device into the uterus *Mirena is effective for 5 years, Terri is effective for 3 years for up to three years                                                    *Can be used while breastfeeding                                  *May improve period cramps and bleeding *Contains only Progestin                  *May cause lighter periods, spotting, or no period at all   ParaGard IUD (Copper IUD) 99% Provider places the device in to the uterus *Effective for up to 12 years                         *Can be used while breastfeeding *May cause more cramps and heavier bleeding   Male Condom 79-95% Use a new condom each time during intercourse *Protects against STI's                      *Can be used while breastfeeding                                  *Widely available *May break or slip off                   *May cause decreased sensation   Female Condom 79-95% Use a new condom each time during intercourse *Protects against STI's                         *Can be used while breastfeeding                                  *Widely available                           *Can insert in advance *May be hard to insert                     *May slip out of place during intercourse   Diaphragm 84-94% Must be used each time during intercourse and must be used with spermicide. Must be fitted by a provider *Can reuse for two years             *Inexpensive                                    *Can be used while breastfeeding *Should not be used with vaginal bleeding or infection                            *Raises risk of bladder infection   Emergency Contraception 58-94% Take pill(s) as soon as possible after unprotected intercourse. The pill can be taken up to five days after unprotected intercourse *Can be used while breastfeeding                                  *Available at pharmacies *Women under 17 need a prescription for some brands                                *May be expensive                          *May cause stomach upset or  "nausea                                                 Surgical Sterilization (Tubal Ligation) >99% Fallopian tubes are cut, sealed, or tied by a provider. There are tiny incisions made in the abdomen *Safe and highly effective                            *Lasts a lifetime *Cannot be undone   Non-Surgical Sterilization (Essure) >99% Micro-insert placed into each fallopian tube by a provider. Body tissue grows into the inserts and blocks the fallopian tubes *Safe and highly effective                            *Lasts a lifetime                               *No incisions required *Cannot be undone                       *Waiting period of 3 months where other back up birth control method(s) should be used   O:   BP 99/67   Pulse 76   Ht 1.549 m (5' 1\")   LMP 2025 (Exact Date)   BMI 22.67 kg/m    Pelvic exam: deferred, patient self swabbed for  GC/chl, multiplex.    A:  Cindy Contreras is a 29 year old y/o  here today for depo provera injection, STI testing and interested in tubal sterilization.   Assessment & Plan  Screen for STD (sexually transmitted disease)    Orders:     HIV Antigen Antibody Combo; Future     Treponema Abs w Reflex to RPR and Titer; Future     Chlamydia trachomatis/Neisseria gonorrhoeae by PCR     Multiplex Vaginal Panel by PCR     Hepatitis C antibody; Future     Hepatitis B surface antigen; Future    Encounter for initial prescription of injectable contraceptive    Orders:     medroxyPROGESTERone (DEPO-PROVERA) injection 150 mg       P:  Patient disappointed to hear that depo can cause weight gain but would like one injection today, and referral for OB-Gyn surgical consult for sterilization.  Patient understands to continue , condoms or abstinence for one week, while depo takes effect.  STI labs drawn today.  Patient to make appointment with OB before leaving clinic today for tubal consultation.  Due for PAP 2026    RTC in one year or PRN    Hannah KNOX, BEA Ly " LEANDER Fowler RN Student Nurse Midwife    I, Aliyah Fowler, am serving as a scribe; to document services personally performed by Hannah Quintana CNM based on data collection and the provider's statements to me.   Aliyah Lacyttery    Patient was seen with student who acted as my scribe and was present for learning. I personally assessed, examined and made medical decisions reflected in the documentation. Any necessary edits to the note have been made by myself. Hannah Quintana CNM          Again, thank you for allowing me to participate in the care of your patient.      Sincerely,    Hannah Quintana CNM

## 2025-04-17 NOTE — PROGRESS NOTES
"Gyn Clinic Visit Note  2025    S: Cindy Contreras is a 29 year old  here today for depo shot.  Patient currently on pills but doesn't always remember to take them, would like to go back to the depo shot.    Gyn Hx:   Patient's last menstrual period was 2025 (exact date). LMP 25.    Contraception:   OCPs now, previously: depo, nexplanon x2, IUD (which was \"too big for my uterus and sticking into my fallopian tubes\")  Sexual Activity  yes, single partner, contraception - oral contraceptives (combined)  Male  Sexually transmitted disease history:  Chlamydia and Gonorrhea    Patient recently lost 60lbs and would like to avoid weight gain.    Patient states that they are interested in sterilization but previously missed consult appointment at an outside clinic.      Contraception start -   Method interested in: depo or other injectable and tubal ligation              Methods used previously: pill:  and depo provera. Patient states she has tried \"everything but the nuva ring\" and is not interested in that  Problems with previous methods: YES  History of pregnancies:         Patient's last menstrual period was 2025 (exact date).         PAP: 23 NILM next due 2026  : 0  Para: 0  Menstrual cycle: Inconsistent on pill- continously cycles  Flow: a week long, not heavy  History of migraines: No  Smoker: YES- vape  Accompanying Signs & Symptoms:   Dysuria: No  Vaginal discharge: No  Painful intercourse: No  Precipitating and/or Alleviating factors:    Currently sexually active: YES  In stable relationship: YES  Desire STD testing: YES  Are you planning a pregnancy soon: No     Reviewed all contraceptive options:    Method Efficacy How to use it What's good about it Other Considerations   Combined Oral Contraceptives 92-99% Take one pill everyday. During the days you take the placebo pill, you will get your menses *Can make periods more regular and less painful                           "     *Can improve PMS symptoms *Contains Estrogen and Progestin                                            *May cause spotting for the first few months                             *May cause nausea, weight gain, headaches. May be relieved by switching brands   Progestin-only pill (mini-pill) 92-99% Take a pill every day at the exact same time *Can be used while breastfeeding *May cause spotting                     *Need to be consistent   NuvaRing 92-99% Insert ring into the vagina, keep in for 3 weeks, remove ring for 4th week *Can make periods more regular and less painful                               *No pill to take daily *Contains Estrogen and Progestin                                            *May cause spotting for the first few months                             *Can increase vaginal discharge                            *Can be removed for IC but not necessary   Depo-Provera Shot 97-99% Get the shot every three months *Can be used while breastfeeding                                  *Each shot works for three months *May cause spotting, no period, weight gain, and may worsen depression                                        *May cause delay in getting pregnant after discontinuing   Nexplanon (the implant) 97-99% Provider places a thin plastic lilibeth under the skin of the upper arm *Effective for up to three years                                                    *Can be used while breastfeeding *Contains only Progestin                  *May cause irregular bleeding                            *After one year, many women do not have a period   Mirena IUD &Terri IUD (Progestin IUD) 99% Provider places the device into the uterus *Mirena is effective for 5 years, Terri is effective for 3 years for up to three years                                                    *Can be used while breastfeeding                                  *May improve period cramps and bleeding *Contains only Progestin                   *May cause lighter periods, spotting, or no period at all   ParaGard IUD (Copper IUD) 99% Provider places the device in to the uterus *Effective for up to 12 years                         *Can be used while breastfeeding *May cause more cramps and heavier bleeding   Male Condom 79-95% Use a new condom each time during intercourse *Protects against STI's                      *Can be used while breastfeeding                                  *Widely available *May break or slip off                   *May cause decreased sensation   Female Condom 79-95% Use a new condom each time during intercourse *Protects against STI's                         *Can be used while breastfeeding                                  *Widely available                           *Can insert in advance *May be hard to insert                     *May slip out of place during intercourse   Diaphragm 84-94% Must be used each time during intercourse and must be used with spermicide. Must be fitted by a provider *Can reuse for two years             *Inexpensive                                    *Can be used while breastfeeding *Should not be used with vaginal bleeding or infection                            *Raises risk of bladder infection   Emergency Contraception 58-94% Take pill(s) as soon as possible after unprotected intercourse. The pill can be taken up to five days after unprotected intercourse *Can be used while breastfeeding                                  *Available at pharmacies *Women under 17 need a prescription for some brands                                *May be expensive                          *May cause stomach upset or nausea                                                 Surgical Sterilization (Tubal Ligation) >99% Fallopian tubes are cut, sealed, or tied by a provider. There are tiny incisions made in the abdomen *Safe and highly effective                            *Lasts a lifetime *Cannot be undone   Non-Surgical  "Sterilization (Essure) >99% Micro-insert placed into each fallopian tube by a provider. Body tissue grows into the inserts and blocks the fallopian tubes *Safe and highly effective                            *Lasts a lifetime                               *No incisions required *Cannot be undone                       *Waiting period of 3 months where other back up birth control method(s) should be used   O:   BP 99/67   Pulse 76   Ht 1.549 m (5' 1\")   LMP 2025 (Exact Date)   BMI 22.67 kg/m    Pelvic exam: deferred, patient self swabbed for  GC/chl, multiplex.    A:  Cindy Contreras is a 29 year old y/o  here today for depo provera injection, STI testing and interested in tubal sterilization.   Assessment & Plan  Screen for STD (sexually transmitted disease)    Orders:    HIV Antigen Antibody Combo; Future    Treponema Abs w Reflex to RPR and Titer; Future    Chlamydia trachomatis/Neisseria gonorrhoeae by PCR    Multiplex Vaginal Panel by PCR    Hepatitis C antibody; Future    Hepatitis B surface antigen; Future    Encounter for initial prescription of injectable contraceptive    Orders:    medroxyPROGESTERone (DEPO-PROVERA) injection 150 mg       P:  Patient disappointed to hear that depo can cause weight gain but would like one injection today, and referral for OB-Gyn surgical consult for sterilization.  Patient understands to continue , condoms or abstinence for one week, while depo takes effect.  STI labs drawn today.  Patient to make appointment with OB before leaving clinic today for tubal consultation.  Due for PAP 2026    RTC in one year or PRN    BEA Mishra BSN RN Student Nurse Midwife    I, Aliyah Fowler, am serving as a scribe; to document services personally performed by Hannah Quintana CNM based on data collection and the provider's statements to me.   Aliyah Fowler    Patient was seen with student who acted as my scribe and was present for " learning. I personally assessed, examined and made medical decisions reflected in the documentation. Any necessary edits to the note have been made by myself. Hannah Quintana CNM

## 2025-04-28 ENCOUNTER — OFFICE VISIT (OUTPATIENT)
Dept: FAMILY MEDICINE | Facility: CLINIC | Age: 30
End: 2025-04-28
Payer: COMMERCIAL

## 2025-04-28 VITALS
TEMPERATURE: 98.8 F | OXYGEN SATURATION: 100 % | HEART RATE: 89 BPM | RESPIRATION RATE: 16 BRPM | DIASTOLIC BLOOD PRESSURE: 72 MMHG | SYSTOLIC BLOOD PRESSURE: 107 MMHG

## 2025-04-28 DIAGNOSIS — R39.89 GENITAL SORE: Primary | ICD-10-CM

## 2025-04-28 DIAGNOSIS — F31.81 BIPOLAR 2 DISORDER (H): ICD-10-CM

## 2025-04-28 DIAGNOSIS — F60.3 BORDERLINE PERSONALITY DISORDER (H): ICD-10-CM

## 2025-04-28 PROCEDURE — 99213 OFFICE O/P EST LOW 20 MIN: CPT | Performed by: FAMILY MEDICINE

## 2025-04-28 PROCEDURE — 3074F SYST BP LT 130 MM HG: CPT | Performed by: FAMILY MEDICINE

## 2025-04-28 PROCEDURE — 87529 HSV DNA AMP PROBE: CPT | Performed by: FAMILY MEDICINE

## 2025-04-28 PROCEDURE — 3078F DIAST BP <80 MM HG: CPT | Performed by: FAMILY MEDICINE

## 2025-04-28 ASSESSMENT — PATIENT HEALTH QUESTIONNAIRE - PHQ9
SUM OF ALL RESPONSES TO PHQ QUESTIONS 1-9: 19
10. IF YOU CHECKED OFF ANY PROBLEMS, HOW DIFFICULT HAVE THESE PROBLEMS MADE IT FOR YOU TO DO YOUR WORK, TAKE CARE OF THINGS AT HOME, OR GET ALONG WITH OTHER PEOPLE: EXTREMELY DIFFICULT
SUM OF ALL RESPONSES TO PHQ QUESTIONS 1-9: 19

## 2025-04-28 NOTE — PROGRESS NOTES
Assessment & Plan     Genital sore  Possible folliculitis as this is in a hair bearing area. I did swab for possible herpes per pt request as well. She will avoid sexual contact until the sore has healed. See below. If not improving, schedule with gyn  - Herpes Simplex Virus 1&2 by PCR    Borderline personality disorder   Bipolar 2 disorder   She reports today she is following with psychiatry for mental health concerns.       Patient Instructions   I recommend warm water soaks or warm washcloth to the area to encourage drainage and healing.   If you notice redness, increase in size of the spot, increase in pain, then I recommend seeing gynecology right away (or ER if severe)  I will let you know when the lab results return. Avoid sexual contact until the area has healed.      Subjective   Gerson is a 29 year old, presenting for the following health issues:  Vaginal Problem    History of Present Illness       Reason for visit:  Bump on labia  Symptom onset:  1-3 days ago  Symptom intensity:  Mild  Symptom progression:  Staying the same  Had these symptoms before:  No Gerson is missing 4 dose(s) of medications per week.  Gerson is not taking prescribed medications regularly due to remembering to take.                Objective    /72 (BP Location: Right arm, Patient Position: Chair, Cuff Size: Adult Regular)   Pulse 89   Temp 98.8  F (37.1  C) (Oral)   Resp 16   LMP 04/17/2025 (Exact Date)   SpO2 100%   There is no height or weight on file to calculate BMI.  Physical Exam   GENERAL: alert and no distress   (female): normal female external genitalia with exception of a small mildly erythematous pustule present on the right labia majora, slightly tender, no appreciable underlying nodule, no induration.            Signed Electronically by: Lexii Figueredo MD

## 2025-04-28 NOTE — PATIENT INSTRUCTIONS
I recommend warm water soaks or warm washcloth to the area to encourage drainage and healing.   If you notice redness, increase in size of the spot, increase in pain, then I recommend seeing gynecology right away (or ER if severe)  I will let you know when the lab results return. Avoid sexual contact until the area has healed.

## 2025-04-29 LAB
HSV1 DNA SPEC QL NAA+PROBE: NOT DETECTED
HSV2 DNA SPEC QL NAA+PROBE: NOT DETECTED
SPECIMEN TYPE: NORMAL

## 2025-04-29 NOTE — PROGRESS NOTES
Medication Therapy Management (MTM) Encounter    ASSESSMENT:                              Mental Health   BPD:   No medication changes recommended today. Patient is back on medications consistently and using pillbox. Reviewed potential for cognitive side effects from antispychotic medications including abilify. Per patient report abilify was started prior to care in our clinic for bipolar 2, but is no longer clear if diagnosis is accurate or what symptoms abilify may be helping with. Patient will discuss with Dr. Dangelo at next visit.     Weight Management  Reviewed potential for cognitive side effects from topiramate and anxiety/angus risk with phentermine (although patient reports bipolar 2 is historical/inaccurate dx). Patient to follow-up with weight .    PLAN:                            No changes today; continue to take your medications as prescribed    Follow-up:   Appointments in Next Year      May 07, 2025 8:20 AM  (Arrive by 8:05 AM)  RETURN RHINOLOGY with Reinier Glasgow MD  [facility] ([facility]) [phone]     May 13, 2025 8:00 AM  (Arrive by 7:45 AM)  Return Patient with Yari Dangelo MD  [facility] ([facility]) [phone]     Sep 12, 2025 3:00 PM  (Arrive by 2:45 PM)  Return Weight Management Visit with Kate Rasheed PA-C  [facility] [place] ([facility] ) [phone]     Oct 31, 2025 3:10 PM  (Arrive by 2:50 PM)  Preventative Adult Visit with ABILIO Lima CNP  [facility] ([facility]) [phone]            SUBJECTIVE/OBJECTIVE:                          Gerson Contreras is a 29 year old adult seen for an initial visit. Gerson was referred to me from psychiatry.      Reason for visit: add on visit to discuss medication compliance - see  "[employer] message 4/28/25.    Allergies/ADRs: Reviewed in chart  Past Medical History: Reviewed in chart  Tobacco: Gerson reports that Gerson quit smoking about 4 years ago. Gerson's smoking use included cigarettes. Gerson started smoking about 10 years ago. Gerson has a 2.5 pack-year smoking history. Gerson has been exposed to tobacco smoke. Gerson has quit using smokeless tobacco.  Alcohol: not currently using    Regimen:  AM: phentermine, spironolactone, Abilify, NAC, ferrous sulfate  PM: magnesium, topiramate  AS NEEDED: propranolol for anxiety/panic - takes about twice weekly    Mental Health   BPD  Abilify 5mg daily  Propranolol 10mg daily as needed anxiety/panic       Today, patient reports:   - worsening symptoms in the context of work related stressors. Now on FMLA since last week - 4/25/25  - stopped taking all their medications for about 1 week; this was in the context of increased symptoms, feeling overwhlemed, not sleeping well and forgetting medications; prior to 1 week of stopping medications she was forgetting medications about 3 days per week  - now back on her medications and has been taking every day for the last week. Using pillbox  - is starting to feel a little better knowing they will be able to take better care of their mental health while on leave from work  - wonders about cognitive side effects from medications; feels \"dumber\"; trouble with verbal processing - thinks it may have gotten worse with Abilify dose increase.   - reports history of bipolar 2 dx, but feels its inaccurate. Does identify BPD symptoms. DBT has been helpful.   - wonders what Abilify is helping with - still very irritable; mood lability; A/V hallucinations in the remote past but hasn't had any since starting Abilify; has been on Abilify since 2022       Weight Management  Phentermine 15mg daily  Topiramate 75mg nightly    Currently taking topiramate 25mg daily due to retitrating back up after abruptly stopping. Feels like weight loss " has plateaued and they are gaining weight back.  Reports has asked about increasing phentermine, but provider was hesitant given bipolar dx.       ----------------      I spent 40 minutes with this patient today.     A summary of these recommendations was sent via [employer].    Vaishali Gong, PharmD, BCPP  Medication Therapy Management Pharmacist  [facility]      Telemedicine Visit Details  The patient's medications can be safely assessed via a telemedicine encounter.  Type of service:  Video Conference via [employer]  Originating Location (pt. Location): Home    Distant Location (provider location):  Off-site  Start Time:  8a  End Time:  840a     Medication Therapy Recommendations  No medication therapy recommendations to display

## 2025-04-30 ENCOUNTER — MYC MEDICAL ADVICE (OUTPATIENT)
Dept: PSYCHIATRY | Facility: CLINIC | Age: 30
End: 2025-04-30
Payer: COMMERCIAL

## 2025-04-30 ENCOUNTER — VIRTUAL VISIT (OUTPATIENT)
Dept: PHARMACY | Facility: CLINIC | Age: 30
End: 2025-04-30
Payer: COMMERCIAL

## 2025-04-30 DIAGNOSIS — F60.3 BORDERLINE PERSONALITY DISORDER (H): Primary | ICD-10-CM

## 2025-04-30 DIAGNOSIS — Z76.89 ENCOUNTER FOR WEIGHT MANAGEMENT: ICD-10-CM

## 2025-04-30 PROCEDURE — 99207 PR NO CHARGE LOS: CPT | Mod: 95 | Performed by: PHARMACIST

## 2025-04-30 NOTE — PATIENT INSTRUCTIONS
"Recommendations from today's MTM visit:                                                      No changes today; continue to take your medications as prescribed    It was great speaking with you today.  I value your experience and would be very thankful for your time in providing feedback in our clinic survey. In the next few days, you may receive an email or text message from [facility] [employer] with a link to a survey related to your  clinical pharmacist.\"     To schedule another MTM appointment, please call the clinic directly or you may call the MTM scheduling line at [phone] or toll-free at [phone].     My Clinical Pharmacist's contact information:                                                      Please feel free to contact me with any questions or concerns you have.      Vaishali Gong, PharmD, BCPP  Medication Therapy Management Pharmacist  [facility]    "

## 2025-05-01 NOTE — RESULT ENCOUNTER NOTE
Excellent! Please call or send a Zazzle message if you have any questions. Lexii Figueredo M.D.

## 2025-05-06 NOTE — TELEPHONE ENCOUNTER
The forms have been completed and signed by Dr. Dangelo.     Writer faxed the forms, along with GARRICK, to [employer] at fax #: [phone], claim #: [id number].     The forms were sent to HIM for scanning and will be held in nurse triage office until scanning is complete.     Writer sent a copy of the forms to Patient via [employer] message.     Carmita Erickson, EMT

## 2025-05-12 NOTE — PROGRESS NOTES
"   [facility]  Brief Care Team  MEDICAL PROGRESS NOTE       CARE TEAM:    PCP- Siobhan Covington  Therapist- Marycruz Degroot at [facility]   DBT through [facility]    Gerson is a 29 year old who uses the pronouns she/her/hers, they/them/theirs, with history of bipolar 2 disorder, BPD and generalized anxiety disorder.                 Assessment & Plan     Bipolar 2 disorder  Borderline personality disorder    Today, Gerson reports that she has been experiencing significant stress from work that has contributed to worsening mood and irritability and is now taking FMLA. She plans to focus more on DBT in the meantime. She does report recent disruptive sleep including vivid dreams, nightmares and \"sleep paralysis\". She notes that these have occurred before during times of stress previously but self-resolve. Discussed option to utilize magnesium glycinate- she reports having supplement for this already. Will consider a sleep study if parasomnias persist. She did not trial intuniv due to guidance that she would need to decrease spironolactone and would like to hold off for now. Discussed optimizing abilify for mood stabilization and encouraged continued participation in DBT.     Psychotropic Drug Interactions:    Blood pressure lowering agents: abilify + propranolol  Seizure-threshold lowering potential: abilify + phentermine  CNS depressant: abilify +topiramate  Management: routine monitoring    Risk Statements:   Treatment Risk: Risks, benefits, alternatives and potential adverse effects have been discussed and are understood.   Safety Risk: Gerson did not appear to be an imminent safety risk to self or others.    PLAN    1) Medications:   - increase aripiprazole (Abilify) to 10 mg daily  - continue propranolol 10mg daily as needed for public speaking/panic    Takes magnesium supplement     Other prescriptions from PCP/weight loss clinic:  -  phentermine   -  " topiramate 25 mg daily  -  spironolactone 100 mg    2) Psychotherapy: continue DBT    3) Next due:  Labs: [facility] labs 08/24: hemoglobin A1c: 5.8, prediabetes range; lipid panel: cholesterol elevated 216-, both increased since last year, discussed this with pt  EKG- No routine monitoring indicated for current psychotropic regimen  Rating scales- AIMS: Determine next due    4) Referrals: none    5) Follow-up: Return to clinic in 5 weeks                 Interval History   - Updates: went to visit family in [place], healthy relationships. Trying to focus on DBT and going to PT as well. Also also in school full time for [employer] and certifiate in non profit management  -Mood: good, was struggling first week on FMLA, was feeling very overwhelmed, thinks there was also a trigger and had an episode of increased energy, feeling restless and goal-oriented for 2 days planned out every hour of day, irritable, sleep was fine these days  -Depression: denies persistent symptoms  -SI: has had some intrusive thoughts about SI, talked to therapist about this but denied having intent or plan, thoughts happening less often, not ruminating about it   -Anxiety: okay   -Sleep: overall disrupted, a lot of lucid dreams, sleep paralysis, a couple nights didn't sleep at all, has happened in past when experiencing stress. Sleep paralysis, feels half awake, body asleep but mind feels like having hallucinations. Also having nightmares (happening for 10 years, flare up a couple times a year).   -Appetite/eating: okay, when hyperfixated didn't eat much  -Psychosis: denies paranoia, hallucinations only in context of sleeping  -Substances: not drinking or using cannabis, trying to quit vaping  -Therapy: started individual DBT October and groups in November, has found it's actually helpful  -Medications: was a period before stopping working when stopped taking all of her meds. Not sure if topiramate working. Notices an effect with phentermine. 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 Continued to recommend DBT  12/2024: Added Intuniv for increased irritability, coordinated with PCP who decreased spironolactone in conjunction  05/2025: increase abilify to 10 mg daily for mood stabilization                  Physical Exam  (Vitals Only)    LMP 04/17/2025 (Exact Date)   Pulse Readings from Last 3 Encounters:   04/28/25 89   04/17/25 76   11/01/24 76     Wt Readings from Last 3 Encounters:   11/12/24 54.4 kg (120 lb)   11/01/24 54.5 kg (120 lb 3.2 oz)   10/30/24 54 kg (119 lb)     BP Readings from Last 3 Encounters:   04/28/25 107/72   04/17/25 99/67   11/01/24 105/68                      Mental Status Exam    Alertness: alert  and oriented  Appearance: casually groomed  Behavior/Demeanor: cooperative and calm, with good  eye contact   Speech: normal and regular rate and rhythm  Language: intact and no problems  Psychomotor: normal or unremarkable  Mood: improved   Affect: restricted; congruent to: mood- yes, content- yes  Thought Process/Associations: unremarkable  Thought Content:  Reports none;  Denies suicidal ideation  Perception:  Reports none;  Denies hallucinations  Insight: good  Judgment: good  Cognition: does  appear grossly intact; formal cognitive testing was not done  Gait and Station: N/A  (telehealth)                  Past Psychotropic Medication Trials    Medication Max Dose (mg) Dates / Duration Helpful? DC Reason / Adverse Effects?   Zoloft       Triggered hypomania   Abilify 2.5   Y     Lexapro 5   Y     Lamotrigine 200  1/2024 N                      Past Medical History     Patient Active Problem List   Diagnosis    Acne    Bipolar 2 disorder (H)    Environmental allergies    Chronic bilateral thoracic back pain    Chronic pain of both shoulders    Neck pain    Borderline personality disorder (H)                   Medications     Current Outpatient Medications   Medication Sig Dispense Refill    Acetylcysteine (NAC PO) Take 1,000 mg by mouth daily. NAC      ARIPiprazole (ABILIFY) 5 MG tablet Take 1 tablet (5 mg) by mouth daily. 30 tablet 0    azelastine (ASTELIN) 0.1 % nasal spray Spray 2 sprays into both nostrils as needed for rhinitis or allergies.      Ferrous Sulfate (IRON PO) Take 65 mg by mouth daily.      loratadine (CLARITIN) 10 MG tablet Take 10 mg by mouth daily as needed for allergies.      MAGNESIUM ASPARTATE PO Take 400 mg by mouth daily.      methocarbamol (ROBAXIN) 500 MG tablet Take 1-2 tablets (500-1,000 mg) by mouth at bedtime. (Patient taking differently: Take 500-1,000 mg by mouth as needed.) 30 tablet 1    phentermine 15 MG capsule Take 1 capsule (15 mg) by mouth every morning. 30 capsule 3    propranolol (INDERAL) 10 MG tablet Take 1 tablet (10 mg) by mouth daily as needed (for performance anxiety or panic). 30 tablet 1    spironolactone (ALDACTONE) 25 MG tablet Take 1 tablet (25 mg) by mouth daily. 30 tablet 2    topiramate (TOPAMAX) 25 MG tablet Take  75mg at bedtime 90 tablet 3                   Data         11/12/2024     8:36 AM 12/3/2024     9:24 AM 5/13/2025     7:52 AM   PROMIS-10 Total Score w/o Sub Scores   PROMIS TOTAL - SUBSCORES 28  28  26        Patient-reported    Proxy-reported         12/3/2024     9:24 AM 4/28/2025     2:43 PM 5/13/2025     7:50 AM   PHQ-9  SCORE   PHQ-9 Total Score MyChart 7 (Mild depression) 19 (Moderately severe depression) 8 (Mild depression)   PHQ-9 Total Score 7  19  8        Patient-reported    Proxy-reported         1/24/2024     9:41 AM 2/13/2024     8:00 AM 6/5/2024     3:51 PM   BLANCA-7 SCORE   Total Score 14 (moderate anxiety) 20 (severe anxiety) 20 (severe anxiety)   Total Score 14    14 20 20       Liver/Kidney Function, TSH Metabolic Blood counts   Recent Labs   Lab Test 11/01/24 0942 08/06/24 0926   AST  --  18   ALT  --  22   ALKPHOS  --  50   CR 0.80 0.80     Recent Labs   Lab Test 11/01/24 0942   TSH 1.30    Recent Labs   Lab Test 08/06/24 0926   CHOL 216*   TRIG 114   *   HDL 63     Recent Labs   Lab Test 08/06/24 0926   A1C 5.8*     Recent Labs   Lab Test 11/01/24 0942   GLC 91    Recent Labs   Lab Test 11/01/24 0942   WBC 7.4   HGB 13.6   HCT 40.7   MCV 89              Level of Medical Decision Making:   - At least 1 chronic problem that is not stable  - Engaged in prescription drug management during visit (discussed any medication benefits, side effects, alternatives, etc.)     The longitudinal plan of care for the diagnosis(es)/condition(s) as documented were addressed during this visit. Due to the added complexity in care, I will continue to support Gerson in the subsequent management and with ongoing continuity of care.       PROVIDER: Yari Dangelo MD    Patient staffed in clinic with Dr. Serrano who will sign the note.  Supervisor is Dr. Wilhelm.

## 2025-05-13 ENCOUNTER — VIRTUAL VISIT (OUTPATIENT)
Dept: PSYCHIATRY | Facility: CLINIC | Age: 30
End: 2025-05-13
Attending: PSYCHIATRY & NEUROLOGY
Payer: COMMERCIAL

## 2025-05-13 DIAGNOSIS — F31.81 BIPOLAR 2 DISORDER (H): ICD-10-CM

## 2025-05-13 DIAGNOSIS — F60.3 BORDERLINE PERSONALITY DISORDER (H): Primary | ICD-10-CM

## 2025-05-13 PROCEDURE — G2211 COMPLEX E/M VISIT ADD ON: HCPCS | Mod: 95

## 2025-05-13 PROCEDURE — 98006 SYNCH AUDIO-VIDEO EST MOD 30: CPT | Mod: GC

## 2025-05-13 RX ORDER — ARIPIPRAZOLE 10 MG/1
10 TABLET ORAL EVERY MORNING
Qty: 30 TABLET | Refills: 1 | Status: SHIPPED | OUTPATIENT
Start: 2025-05-13

## 2025-05-13 RX ORDER — PROPRANOLOL HYDROCHLORIDE 10 MG/1
10 TABLET ORAL DAILY PRN
Qty: 30 TABLET | Refills: 1 | Status: SHIPPED | OUTPATIENT
Start: 2025-05-13

## 2025-05-13 ASSESSMENT — PAIN SCALES - GENERAL: PAINLEVEL_OUTOF10: MODERATE PAIN (5)

## 2025-05-13 ASSESSMENT — PATIENT HEALTH QUESTIONNAIRE - PHQ9
SUM OF ALL RESPONSES TO PHQ QUESTIONS 1-9: 8
10. IF YOU CHECKED OFF ANY PROBLEMS, HOW DIFFICULT HAVE THESE PROBLEMS MADE IT FOR YOU TO DO YOUR WORK, TAKE CARE OF THINGS AT HOME, OR GET ALONG WITH OTHER PEOPLE: SOMEWHAT DIFFICULT
SUM OF ALL RESPONSES TO PHQ QUESTIONS 1-9: 8

## 2025-05-13 NOTE — PATIENT INSTRUCTIONS
Medications:   - increase aripiprazole (Abilify) to 10 mg daily  - continue propranolol 10mg daily as needed for public speaking/panic    Can consider magnesium glycinate supplementation for sleep.     Continue DBT     **For crisis resources, please see the information at the end of this document**   Patient Education    Thank you for coming to the [facility] [place] [facility].     Lab Testing:  If you had lab testing today and your results are reassuring or normal they will be mailed to you or sent through [employer] within 7 days. If the lab tests need quick action we will call you with the results. The phone number we will call with results is # [phone]. If this is not the best number please call our clinic and change the number.     Medication Refills:  If you need any refills please call your pharmacy and they will contact us. Our fax number for refills is [phone].   Three business days of notice are needed for general medication refill requests.   Five business days of notice are needed for controlled substance refill requests.   If you need to change to a different pharmacy, please contact the new pharmacy directly. The new pharmacy will help you get your medications transferred.     Contact Us:  Please call [phone] during business hours (8-5:00 M-F).   If you have medication related questions after clinic hours, or on the weekend, please call [phone].     Financial Assistance [phone]   Medical Records [phone]       MENTAL HEALTH CRISIS RESOURCES:  For a emergency help, please call 911 or go to the nearest Emergency Department.     Emergency Walk-In Options:   [facility] Unit @ [facility] ([place]): [phone] - Specialized mental health emergency area designed to be calming  [facility] ([place]): [phone]  [facility] ([place]): [phone]  [facility] ([place]):  [phone]    [facility] Crisis Information:   Lynn: [phone]  Roger: [phone]  Deon (DENITA) - Adult: [phone]     Child: [phone]  Leon[place] - Adult: [phone]     Child: [phone]  [place]: [phone]  List of all [facility] resources:   https://[place].gov/dhs/people-we-serve/adults/health-care/mental-health/resources/crisis-contacts.jsp    National Crisis Information:   Crisis Text Line: Text  [place]  to 578201  Suicide & Crisis Lifeline: 988  National Suicide Prevention Lifeline: 4-791-639-TALK (1-821.727.4495)       For online chat options, visit https://suicidepreventionlifeline.org/chat/  Poison Control Center: 1-598.389.1271  Trans Lifeline: 1-807.147.1353 - Hotline for transgender people of all ages  The [place] [employer]: 1-259-348-6550 - Hotline for LGBT youth     For Non-Emergency Support:   Fast Tracker: Mental Health & Substance Use Disorder Resources -   https://www.[employer].org/

## 2025-05-13 NOTE — PROGRESS NOTES
Virtual Visit Details    Type of service:  Video Visit   Start: 811  End: 846    Originating Location (pt. Location): Home    Distant Location (provider location):  On-site  Platform used for Video Visit: Latosha

## 2025-05-13 NOTE — NURSING NOTE
Current patient location: [address] [place] [address]    Is the patient currently in the state of [place]? YES    Visit mode: VIDEO    If the visit is dropped, the patient can be reconnected by:VIDEO VISIT: Text to cell phone:   Telephone Information:   Mobile [phone]       Will anyone else be joining the visit? NO  (If patient encounters technical issues they should call [phone]    Are changes needed to the allergy or medication list? Pt stated no changes to allergies and Pt stated no med changes  Patient stated they are in process of quitting vaping    Are refills needed on medications prescribed by this physician? Discuss with provider  Discuss ARIPiprazole (ABILIFY)     Rooming Documentation:  Questionnaire(s) completed    Reason for visit: RECHECK    Leti HARRIS

## 2025-05-20 ENCOUNTER — TELEPHONE (OUTPATIENT)
Dept: PSYCHIATRY | Facility: CLINIC | Age: 30
End: 2025-05-20
Payer: COMMERCIAL

## 2025-05-20 NOTE — TELEPHONE ENCOUNTER
Received request from [employer], requesting 5/13 visit note be faxed.     Writer faxed the 5/13 visit note to [employer], ATTN: Vaishali, at fax #: [phone].     There is a valid GARRICK on file. A quick disclosure was entered.     Carmita Erickson, EMT

## 2025-05-27 ENCOUNTER — MYC MEDICAL ADVICE (OUTPATIENT)
Dept: PSYCHIATRY | Facility: CLINIC | Age: 30
End: 2025-05-27
Payer: COMMERCIAL

## 2025-05-27 NOTE — TELEPHONE ENCOUNTER
May 27, 2025 2:17 PM  Returned call to patient regarding ongoing sleep concerns.    Current questions/concerns:   -vivid dreams and sleep paralysis gone  -waking up (not to use restroom) about 4-5 times a night, able to fall back asleep easily  -10p-11p bedtime-7a, able to fall asleep easily.   -stopped taking magnesium due to diarrhea  -still not interested in intuniv  -still stressed about work/FMLA  -avoiding napping as she can, caffeine in morning only.  -provided additional sleep hygiene recommendations  -Not interested in seeing pharmacist again     Verified medications from last visit note (5/13/25):   - increase aripiprazole (Abilify) to 10 mg daily  - continue propranolol 10mg daily as needed for public speaking/panic     Takes magnesium supplement: no longer taking     Other prescriptions from PCP/weight loss clinic:  -  phentermine   -  topiramate 25 mg daily  -  spironolactone 100 mg    Next visit: 6/24    Will route to treatment team.    Rona Alanis RN on 5/27/2025 at 2:28 PM

## 2025-05-28 ENCOUNTER — VIRTUAL VISIT (OUTPATIENT)
Dept: PSYCHIATRY | Facility: CLINIC | Age: 30
End: 2025-05-28
Attending: PSYCHIATRY & NEUROLOGY
Payer: COMMERCIAL

## 2025-05-28 DIAGNOSIS — F31.81 BIPOLAR 2 DISORDER (H): ICD-10-CM

## 2025-05-28 DIAGNOSIS — G47.00 INSOMNIA, UNSPECIFIED TYPE: Primary | ICD-10-CM

## 2025-05-28 RX ORDER — PROPRANOLOL HYDROCHLORIDE 10 MG/1
10 TABLET ORAL DAILY PRN
Qty: 30 TABLET | Refills: 1 | Status: SHIPPED | OUTPATIENT
Start: 2025-05-28

## 2025-05-28 RX ORDER — HYDROXYZINE HYDROCHLORIDE 25 MG/1
12.5-25 TABLET, FILM COATED ORAL
Qty: 30 TABLET | Refills: 1 | Status: SHIPPED | OUTPATIENT
Start: 2025-05-28

## 2025-05-28 RX ORDER — ARIPIPRAZOLE 10 MG/1
10 TABLET ORAL EVERY MORNING
Qty: 30 TABLET | Refills: 1 | Status: SHIPPED | OUTPATIENT
Start: 2025-05-28

## 2025-05-28 ASSESSMENT — PAIN SCALES - GENERAL: PAINLEVEL_OUTOF10: MODERATE PAIN (5)

## 2025-05-28 NOTE — PROGRESS NOTES
[facility] Psychiatry Clinic  Brief Care Team  MEDICAL PROGRESS NOTE       CARE TEAM:    PCP- Siobhan Covington  Therapist- Marycruz Degroot at [facility]   DBT through [facility]    Gerson is a 29 year old who uses the pronouns she/her/hers, they/them/theirs, with history of bipolar 2 disorder, BPD and generalized anxiety disorder.                 Assessment & Plan     Bipolar 2 disorder  Borderline personality disorder    Today, Gerson is seen to follow-up on continued difficulty with sleep, particularly staying asleep through the night. She denies any changes in mood and continues to experience anxiety. She tolerated increase in abilify (takes in morning) and has not noticed any particular effects. She reports resolution of previously mentioned sleep paralysis/vivid dreams, but reports while she falls asleep easily, she is waking up frequently throughout the night. Upon wakening, she is not thinking of anything in particular or woken for any identifiable reason and falls back to sleep quickly. Reviewed sleep hygiene and she denies any current concerns with this. She did start taking melatonin-recommended decreasing dose to at most 3 mg and moving up time she takes it from 30 minutes before bedtime to 4 hours before bedtime. Also discussed option to trial hydroxyzine 12.5-25 mg to see if this is helpful for sleep maintenance, potentially if current symptoms are exacerbated by anxiety. Will trial lower doses for now since she would like to avoid any AM sedation.     Psychotropic Drug Interactions:    Blood pressure lowering agents: abilify + propranolol  Seizure-threshold lowering potential: abilify + phentermine  Management: routine monitoring    Risk Statements:   Treatment Risk: Risks, benefits, alternatives and potential adverse effects have been discussed and are understood.   Safety Risk: Gerson did not appear to be an imminent safety risk to self or  others.    PLAN    1) Medications:   - can use hydroxyzine 12.5-25 mg before bedtime as needed for sleep/anxiety  - decrease melatonin to 3 mg, 4 hours before bedtime   - continue aripiprazole (Abilify) to 10 mg daily  - continue propranolol 10mg daily as needed for public speaking/panic    Other prescriptions from PCP/weight loss clinic:  -  phentermine   -  spironolactone 100 mg    2) Psychotherapy: continue DBT    3) Next due:  Labs: [facility] labs 08/24: hemoglobin A1c: 5.8, prediabetes range; lipid panel: cholesterol elevated 216-, both increased since last year, discussed this with pt  EKG- No routine monitoring indicated for current psychotropic regimen  Rating scales- AIMS: Determine next due    4) Referrals: none    5) Follow-up: Return to clinic in 5 weeks                 Interval History   -Mood: stable, denies any changes  -Depression: no significant concerns, has been using skills to manage any symptoms  -SIB/SI: none    -Esme: denies   -Anxiety: situational, going on a trip out of the country next week   -Sleep: no sleep paralysis/nightmares, waking up frequently throughout the night, when wakes up not fully rested, sometimes naps during the day, this has happened before with her waking up frequently. Goes to bed around 10/11 and wakes up around 7. When does wake up doesn't get up, goes back to sleep quickly. Staying asleep more the issue. Doesn't think she snores. Has periods of difficulty with sleep.   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 Continued to recommend DBT  12/2024: Added Intuniv for increased irritability, coordinated with PCP who decreased spironolactone in conjunction  05/2025: increase abilify to 10 mg daily for mood stabilization  05/2025: add hydroxyzine 12.5-25 mg for sleep maintenance/anxiety                  Physical Exam  (Vitals Only)    LMP 04/17/2025 (Exact Date)   Pulse Readings from Last 3 Encounters:   04/28/25 89   04/17/25 76   11/01/24 76     Wt Readings from Last 3 Encounters:   11/12/24 54.4 kg (120 lb)   11/01/24 54.5 kg (120 lb 3.2 oz)   10/30/24 54 kg (119 lb)     BP Readings from Last 3 Encounters:   04/28/25 107/72   04/17/25 99/67   11/01/24 105/68                      Mental Status Exam    Alertness: alert  and oriented  Appearance: casually groomed  Behavior/Demeanor: cooperative and calm, with good  eye contact   Speech: normal and regular rate and rhythm  Language: intact and no problems  Psychomotor: normal or unremarkable  Mood: tired   Affect: restricted; congruent to: mood- yes, content- yes  Thought Process/Associations: unremarkable  Thought Content:  Reports none;  Denies suicidal ideation  Perception:  Reports none;  Denies hallucinations  Insight: good  Judgment: good  Cognition: does  appear grossly intact; formal cognitive testing was not done  Gait and Station: N/A (telehealth)                  Past Psychotropic Medication Trials    Medication Max Dose (mg) Dates / Duration Helpful? DC Reason / Adverse Effects?   Zoloft       Triggered hypomania   Abilify 2.5   Y     Lexapro 5   Y     Lamotrigine 200  1/2024 N                      Past Medical History     Patient Active Problem List    Diagnosis    Acne    Bipolar 2 disorder (H)    Environmental allergies    Chronic bilateral thoracic back pain    Chronic pain of both shoulders    Neck pain    Borderline personality disorder (H)                   Medications     Current Outpatient Medications   Medication Sig Dispense Refill    Acetylcysteine (NAC PO) Take 1,000 mg by mouth daily. NAC      ARIPiprazole (ABILIFY) 10 MG tablet Take 1 tablet (10 mg) by mouth every morning. 30 tablet 1    azelastine (ASTELIN) 0.1 % nasal spray Spray 2 sprays into both nostrils as needed for rhinitis or allergies.      Ferrous Sulfate (IRON PO) Take 65 mg by mouth daily.      loratadine (CLARITIN) 10 MG tablet Take 10 mg by mouth daily as needed for allergies.      MAGNESIUM ASPARTATE PO Take 400 mg by mouth daily.      methocarbamol (ROBAXIN) 500 MG tablet Take 1-2 tablets (500-1,000 mg) by mouth at bedtime. (Patient taking differently: Take 500-1,000 mg by mouth as needed.) 30 tablet 1    phentermine 15 MG capsule Take 1 capsule (15 mg) by mouth every morning. 30 capsule 3    propranolol (INDERAL) 10 MG tablet Take 1 tablet (10 mg) by mouth daily as needed (for performance anxiety or panic). 30 tablet 1    spironolactone (ALDACTONE) 25 MG tablet Take 1 tablet (25 mg) by mouth daily. 30 tablet 2    topiramate (TOPAMAX) 25 MG tablet Take  75mg at bedtime 90 tablet 3                   Data         11/12/2024     8:36 AM 12/3/2024     9:24 AM 5/13/2025     7:52 AM   PROMIS-10 Total Score w/o Sub Scores   PROMIS TOTAL - SUBSCORES 28  28  26        Patient-reported    Proxy-reported         12/3/2024     9:24 AM 4/28/2025     2:43 PM 5/13/2025     7:50 AM   PHQ-9 SCORE   PHQ-9 Total Score MyChart 7 (Mild depression) 19 (Moderately severe depression) 8 (Mild depression)   PHQ-9 Total Score 7  19  8        Patient-reported    Proxy-reported         1/24/2024     9:41 AM 2/13/2024     8:00 AM 6/5/2024     3:51 PM   BLANCA-7 SCORE   Total Score 14 (moderate anxiety) 20 (severe  anxiety) 20 (severe anxiety)   Total Score 14    14 20 20       Liver/Kidney Function, TSH Metabolic Blood counts   Recent Labs   Lab Test 11/01/24 0942 08/06/24 0926   AST  --  18   ALT  --  22   ALKPHOS  --  50   CR 0.80 0.80     Recent Labs   Lab Test 11/01/24 0942   TSH 1.30    Recent Labs   Lab Test 08/06/24 0926   CHOL 216*   TRIG 114   *   HDL 63     Recent Labs   Lab Test 08/06/24 0926   A1C 5.8*     Recent Labs   Lab Test 11/01/24 0942   GLC 91    Recent Labs   Lab Test 11/01/24 0942   WBC 7.4   HGB 13.6   HCT 40.7   MCV 89              Level of Medical Decision Making:   - At least 1 chronic problem that is not stable  - Engaged in prescription drug management during visit (discussed any medication benefits, side effects, alternatives, etc.)     The longitudinal plan of care for the diagnosis(es)/condition(s) as documented were addressed during this visit. Due to the added complexity in care, I will continue to support Gerson in the subsequent management and with ongoing continuity of care.       PROVIDER: Yari Dangelo MD    Patient staffed in clinic with Dr. Serrano who will sign the note.  Supervisor is Dr. Wilhelm.

## 2025-05-28 NOTE — PROGRESS NOTES
Virtual Visit Details    Type of service:  Video Visit   Start 0907  End 0932    Originating Location (pt. Location): Home    Distant Location (provider location):  On-site  Platform used for Video Visit: Latosha

## 2025-05-28 NOTE — NURSING NOTE
Current patient location: [address] [place] [address]    Is the patient currently in the state of [place]? YES    Visit mode: VIDEO    If the visit is dropped, the patient can be reconnected by:VIDEO VISIT: Text to cell phone:   Telephone Information:   Mobile [phone]       Will anyone else be joining the visit? NO  (If patient encounters technical issues they should call [phone]    Are changes needed to the allergy or medication list? Yes please remove from med list per pt:   -topiramate (TOPAMAX) 25 MG tablet   -MAGNESIUM ASPARTATE PO     Are refills needed on medications prescribed by this physician? NO    Rooming Documentation:  Questionnaire(s) completed    Reason for visit: RANDALL JONESF

## 2025-05-28 NOTE — PATIENT INSTRUCTIONS
Medications:  - can use hydroxyzine 12.5-25 mg before bedtime as needed for sleep/anxiety  - decrease melatonin to 3 mg, 4 hours before bedtime   - continue aripiprazole (Abilify) to 10 mg daily  - continue propranolol 10mg daily as needed for public speaking/panic    Sleep hygiene tips:   Keep a regular sleep schedule Try to get up around the same time each morning. This will help you feel sleepy around the same time each evening. Getting up and going to bed on a regular schedule can improve sleep quality long-term.   Avoid napping Try not to take naps. It's especially important to avoid naps lasting longer than 1 hour and naps late in the day.   Limit caffeine Avoid caffeine after lunch. This will allow your body to process most of the caffeine before bedtime so it doesn't keep you from falling asleep.     Limit alcohol In general, avoid alcohol near bedtime. Alcohol often makes you fall asleep quickly but wake up during the night.   Avoid nicotine Nicotine can make it harder to fall asleep and stay asleep. Avoid smoking or vaping near bedtime and at night.   Exercise Getting regular physical activity is good for your health. It can also improve sleep if you exercise at least 4 to 6 hours before bedtime. Heavy exercise within 2 hours of bedtime can make it harder to fall asleep.   Keep the sleep area quiet and dark Noise and light during the night can disrupt sleep. It might help to use white noise or ear plugs. Using blackout shades or an eye mask can block light. It's also a good idea to avoid looking at screens before bedtime.   Bedroom clock Avoid checking the time at night. This can make it harder to fall back asleep. This includes alarm clocks and other devices (like a watch or smartphone).   Evening eating Avoid eating a large meal close to bedtime. Try to eat a healthy and filling (but not too heavy) meal early in the evening. Avoid late-night snacks.       **For crisis resources, please see the  information at the end of this document**   Patient Education    Thank you for coming to the [facility] [place] [facility].     Lab Testing:  If you had lab testing today and your results are reassuring or normal they will be mailed to you or sent through [employer] within 7 days. If the lab tests need quick action we will call you with the results. The phone number we will call with results is # [phone]. If this is not the best number please call our clinic and change the number.     Medication Refills:  If you need any refills please call your pharmacy and they will contact us. Our fax number for refills is [phone].   Three business days of notice are needed for general medication refill requests.   Five business days of notice are needed for controlled substance refill requests.   If you need to change to a different pharmacy, please contact the new pharmacy directly. The new pharmacy will help you get your medications transferred.     Contact Us:  Please call [phone] during business hours (8-5:00 M-F).   If you have medication related questions after clinic hours, or on the weekend, please call [phone].     Financial Assistance [phone]   Medical Records [phone]       MENTAL HEALTH CRISIS RESOURCES:  For a emergency help, please call 911 or go to the nearest Emergency Department.     Emergency Walk-In Options:   EmPATH Unit @ [facility] ([place]): [phone] - Specialized mental health emergency area designed to be calming  [facility] ([place]): [phone]  [facility] ([place]): [phone]  [facility] ([place]): [phone]    County Crisis Information:   [place]: [phone]  [place]: [phone]  [place] ([place]) - Adult: [phone]     Child: [phone]  [place] - Adult: [phone]     Child: [phone]  [place]: [phone]  List of all [facility] resources:    https://[place].gov/dhs/people-we-serve/adults/health-care/mental-health/resources/crisis-contacts.jsp    National Crisis Information:   Crisis Text Line: Text  [place]  to 597048  Suicide & Crisis Lifeline: 988  National Suicide Prevention Lifeline: 6-965-096-TALK (1-246.166.9856)       For online chat options, visit https://suicidepreventionlifeline.org/chat/  Poison Control Center: 6-809-752-8442  Trans Lifeline: 0-171-746-0322 - Hotline for transgender people of all ages  [employer] [place] [employer]: 9-219-144-1324 - Hotline for LGBT youth     For Non-Emergency Support:   Fast Tracker: Mental Health & Substance Use Disorder Resources -   https://www.[employer].org/

## 2025-05-29 ENCOUNTER — TELEPHONE (OUTPATIENT)
Dept: PSYCHIATRY | Facility: CLINIC | Age: 30
End: 2025-05-29
Payer: COMMERCIAL

## 2025-05-29 NOTE — TELEPHONE ENCOUNTER
Writer called the number and extension provided. No answer. LVM with clinic call back number.     Carmita Erickson, EMT

## 2025-05-29 NOTE — TELEPHONE ENCOUNTER
[facility] Call Center    Phone Message    May a detailed message be left on voicemail: yes     Reason for Call: Other: Disability Paperwork Clarification needed     Caller stated the patient filed a short term disability form, but it looks like the provider only certified FMLA only. Caller would like a call back to clarify. Caller is also faxing the questions over as well.    Phone number is [phone], extension # [phone]    Action Taken: Message routed to:  Other: [facility] Nurse [place]    Travel Screening: Not Applicable     Date of Service:

## 2025-05-29 NOTE — TELEPHONE ENCOUNTER
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 I am assisting in review of a short term disability claim for your Patient.    \"We have received your office visit notes dated through 5/13 and disability forms. Exam notes note improved mood, alert, oriented, well groomed, calm, cooperative, good insight, and judgement.     You indicate your Patient has taken FMLA due to work stress, however, she remains full time in the [employer] program.     Please clarify if you are restricting your Patient due to a work role or a work environment issue, please clarify if you are certifying FMLA only or also disability. If disability, please clarify limitations or exam findings that you have assessed or observed in your Patient which would indicate global psychiatric impairment. Please clarify specific activities your Patient is unable to do in both home or work environment.    Please describe any increased frequency or intensity in treatment. Please provide made to an advanced or more specific level of care.    You indicate full duty return to work date of 12 weeks. Please verify or clarify any changes and add any additional information that may assist us in evaluating this claim.\"      Routed to Provider.     Carmita Erickson, EMT    "

## 2025-05-31 ENCOUNTER — TELEPHONE (OUTPATIENT)
Dept: BEHAVIORAL HEALTH | Facility: CLINIC | Age: 30
End: 2025-05-31
Payer: COMMERCIAL

## 2025-05-31 NOTE — TELEPHONE ENCOUNTER
R:    11:32 AM Received a call from pt re: medication. She states she was recently prescribed Hydroxazine for sleep and it does not seem to be working. Still unable to get a full night of sleep. Call back number is: [phone].   11:37 AM Paged on call resident Dr. Tolbert

## 2025-05-31 NOTE — TELEPHONE ENCOUNTER
2:55 PM Pt called back and stated no follow up with Psych resident.  Confirmed information.    3:09 PM Re-paged Psych resident for a call back to #[phone]

## 2025-06-02 NOTE — TELEPHONE ENCOUNTER
Health Call Center    Phone Message    May a detailed message be left on voicemail: yes     Reason for Call: Medication Question or concern regarding medication   Prescription Clarification  Name of Medication: hydroxyzine  Prescribing Provider: Antolin   Pharmacy: [facility] #[id number] - [place], [place] - [address]     What on the order needs clarification?   Patient still not sleeping with increased hydroxyzine dose. Called on call resident over the weekend, who instructed patient to call care team in clinic      Action Taken: Message routed to:  Other: nursing pool, dr post    Travel Screening: Not Applicable

## 2025-06-19 ENCOUNTER — VIRTUAL VISIT (OUTPATIENT)
Dept: ENDOCRINOLOGY | Facility: CLINIC | Age: 30
End: 2025-06-19
Payer: COMMERCIAL

## 2025-06-19 VITALS — HEIGHT: 61 IN | BODY MASS INDEX: 23.41 KG/M2 | WEIGHT: 124 LBS

## 2025-06-19 DIAGNOSIS — E66.811 CLASS 1 OBESITY WITHOUT SERIOUS COMORBIDITY WITH BODY MASS INDEX (BMI) OF 33.0 TO 33.9 IN ADULT, UNSPECIFIED OBESITY TYPE: ICD-10-CM

## 2025-06-19 RX ORDER — PHENTERMINE HYDROCHLORIDE 15 MG/1
15 CAPSULE ORAL EVERY MORNING
Qty: 30 CAPSULE | Refills: 4 | Status: SHIPPED | OUTPATIENT
Start: 2025-06-19

## 2025-06-19 ASSESSMENT — PAIN SCALES - GENERAL: PAINLEVEL_OUTOF10: MODERATE PAIN (4)

## 2025-06-19 NOTE — NURSING NOTE
Is the patient currently in the state of [place]? YES    Visit mode:TELEPHONE    If the visit is dropped, the patient can be reconnected by: TELEPHONE VISIT: Phone number: [phone]    Will anyone else be joining the visit? NO  (If patient encounters technical issues they should call [phone] :[phone])    How would you like to obtain your AVS? MyChart    Are changes needed to the allergy or medication list? No    Are refills needed on medications prescribed by this physician? NO    Reason for visit: RANDALL HARRIS

## 2025-06-19 NOTE — PROGRESS NOTES
Return Medical Weight Management Note     Cindy Contreras  MRN:  [id number]  :  1995  ROBERTO:  2025    Dear ABILIO Lima CNP,    I had the pleasure of seeing your patient Cindy Contreras. Gerson is a 29 year old adult who I am continuing to see for treatment of obesity related to:        2023     1:56 PM   --   I have the following health issues associated with obesity None of the above   I have the following symptoms associated with obesity Depression    Back Pain    Fatigue       Assessment & Plan   Problem List Items Addressed This Visit    None  Visit Diagnoses         Class 1 obesity without serious comorbidity with body mass index (BMI) of 33.0 to 33.9 in adult, unspecified obesity type                   INTERVAL HISTORY:  New MWM - 2023   GLP-1 not covered by insurance   Started Topiramate with approval from psychiatrist   Can start Phentermine in the future if needed - approved by psychiatrist   Insulin Resistance - CHANDNI-IR score 5.8    23 - restarted topiramate, was off for 3 weeks due to increase in depression symptoms    2023 - continued topiramate, started Phentermine - approved by psychiatrist with hx of Bipolar II  Episode of hypomania 2024. Reached out to psychiatry team and who agreed it was ok for her to continue phentermine with close monitoring.       Continues to be weight stable. Is happy at this weight. Now focusing on increasing strength - muscle tone.     Anti-obesity medication history    Current:   Phentermine 15mg - continues to find it be helpful. Does not think this has been influencing her mental health. Psychiatrist is comfortable with her continuing.     Topioramate - has been off for around 1.5-2months. discontinued due to starting to see side effects of brain fog. Discussed with [facility] pharmacist and psychiatrist and decided to stop it. Does think stopping was helpful, but unsure if also related       Recent diet changes: has been back to being  mindful of food choices. New  has also been giving her nutritional help as well.     Recent exercise/activity changes: started working with a . Going to the gym 3xweek.     Recent stressors: has been in a really hard space this past couple of months. Is now on medical leave from work. Has been having more mental health symptoms. Continues to see therapist and psychiatrist. In DBT. 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 So, possibly looking into another career.     Recent sleep changes: With increase in mental health symptoms - has not been sleeping as well. Have been adjusting sleep medications.     Vitamins/Labs: ***    CURRENT WEIGHT:   124 lbs 0 oz    Initial Weight (lbs): 175 lbs     Cumulative weight loss (lbs): 51  Weight Loss Percentage: 29.14%    Wt Readings from Last 5 Encounters:   06/19/25 56.2 kg (124 lb)   11/12/24 54.4 kg (120 lb)   11/01/24 54.5 kg (120 lb 3.2 oz)   10/30/24 54 kg (119 lb)   10/15/24 53.5 kg (118 lb)             6/19/2025     1:39 PM   Changes and Difficulties   I have made the following changes to my activity/exercise since my last visit: Started personal training         MEDICATIONS:   Current Outpatient Medications   Medication Sig Dispense Refill    ARIPiprazole (ABILIFY) 10 MG tablet Take 1 tablet (10 mg) by mouth every morning. 30 tablet 1    traZODone (DESYREL) 50 MG tablet Take 0.5-1 tablets (25-50 mg) by mouth at bedtime. 30 tablet 0    Acetylcysteine (NAC PO) Take 1,000 mg by mouth daily. NAC      azelastine (ASTELIN) 0.1 % nasal spray Spray 2 sprays into both nostrils as needed for rhinitis or allergies.      Ferrous Sulfate (IRON PO) Take 65 mg by mouth daily.      hydrOXYzine HCl (ATARAX) 25 MG tablet Take 0.5-1 tablets (12.5-25 mg) by mouth nightly as needed for other or anxiety (sleep). 30 tablet 1    loratadine (CLARITIN) 10 MG tablet Take 10 mg by mouth daily as needed for allergies.       "MAGNESIUM ASPARTATE PO Take 400 mg by mouth daily.      methocarbamol (ROBAXIN) 500 MG tablet Take 1-2 tablets (500-1,000 mg) by mouth at bedtime. (Patient taking differently: Take 500-1,000 mg by mouth as needed.) 30 tablet 1    phentermine 15 MG capsule Take 1 capsule (15 mg) by mouth every morning. 30 capsule 3    propranolol (INDERAL) 10 MG tablet Take 1 tablet (10 mg) by mouth daily as needed (for performance anxiety or panic). 30 tablet 1    spironolactone (ALDACTONE) 25 MG tablet Take 1 tablet (25 mg) by mouth daily. 30 tablet 2           6/19/2025     1:39 PM   Weight Loss Medication History Reviewed With Patient   Which weight loss medications are you currently taking on a regular basis? Phentermine   If you are not taking a weight loss medication that was prescribed to you, please indicate why: I did not like the side effects   Are you having any side effects from the weight loss medication that we have prescribed you? Yes   If you are having side effects please describe: topiramate - cognitive side effects       {Diagnostic Test Results (Optional):315516}        Objective    Ht 1.549 m (5' 1\")   Wt 56.2 kg (124 lb)   BMI 23.43 kg/m    Vitals - Patient Reported  Pain Score: Moderate Pain (4)  Pain Loc: Mid Back      Vitals:  No vitals were obtained today due to virtual visit.      PHYSICAL EXAM:  {video visit exam brief selected:948562}        Sincerely,    Kate Rasheed PA-C      {2021 E&M time:068441}    ***  " nerves grossly intact.  Mentation and speech appropriate for age.  PSYCH: Appropriate affect, tone, and pace of words        Sincerely,    Kate Rasheed PA-C      26 minutes spent by me on the date of the encounter doing chart review, history and exam, documentation and further activities per the note    The longitudinal plan of care for the diagnosis(es)/condition(s) as documented were addressed during this visit. Due to the added complexity in care, I will continue to support Gerson in the subsequent management and with ongoing continuity of care.

## 2025-06-19 NOTE — LETTER
2025       RE: Cindy Contreras  [address]  [place] [address]     Dear Colleague,    Thank you for referring your patient, Cindy Contreras, to the [facility]. Please see a copy of my visit note below.      Originating Location (pt. Location): Home    Distant Location (provider location):  On-site  Telephone visit completed due to the patient did not consent to a video visit.  Phone call duration: 25 minutes    Return Medical Weight Management Note     Cindy Contreras  MRN:  [id number]  :  1995  ROBERTO:  2025    Dear ABILIO Lima CNP,    I had the pleasure of seeing your patient Cindy Nieto is a 29 year old adult who I am continuing to see for treatment of obesity related to:        2023     1:56 PM   --   I have the following health issues associated with obesity None of the above   I have the following symptoms associated with obesity Depression    Back Pain    Fatigue       Assessment & Plan  Problem List Items Addressed This Visit    None  Visit Diagnoses         Class 1 obesity without serious comorbidity with body mass index (BMI) of 33.0 to 33.9 in adult, unspecified obesity type        Relevant Medications    phentermine 15 MG capsule           Continue Phentermine 15mg in the morning. Monitor mood closely. You are ok to discontinue if you think Phentermine starts to influence your mood.   Kate Figueroa PA-C in 4 months         INTERVAL HISTORY:  New MW - 2023   GLP-1 not covered by insurance   Started Topiramate with approval from psychiatrist   Can start Phentermine in the future if needed - approved by psychiatrist   Insulin Resistance - CHANDNI-IR score 5.8    23 - restarted topiramate, was off for 3 weeks due to increase in depression symptoms    2023 - continued topiramate, started Phentermine - approved by psychiatrist with hx of Bipolar  II  Episode of hypomania 1/2024. Reached out to psychiatry team and who agreed it was ok for her to continue phentermine with close monitoring.       Continues to be weight stable. Is happy at this weight. Now focusing on increasing strength - muscle tone.     Anti-obesity medication history    Current:   Phentermine 15mg - continues to find it be helpful. Does not think this has been influencing her mental health. Psychiatrist is comfortable with her continuing.     Topioramate - has been off for around 1.5-2months. discontinued due to starting to see side effects of brain fog. Discussed with [facility] pharmacist and psychiatrist and decided to stop it. Does think stopping was helpful, but unsure if also related to another psychiatric medication. Will continue to hold for now.       Recent diet changes: has been back to being mindful of food choices. New  has also been giving her nutritional help as well.     Recent exercise/activity changes: started working with a . Going to the gym 3xweek.     Recent stressors: has been in a really hard space this past couple of months. Is now on medical leave from work. Has been having more mental health symptoms. Continues to see therapist and psychiatrist. In DBT. 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 So, possibly looking into another career.     Recent sleep changes: With increase in mental health symptoms - has not been sleeping as well. Have been adjusting sleep medications.       CURRENT WEIGHT:   124 lbs 0 oz    Initial Weight (lbs): 175 lbs     Cumulative weight loss (lbs): 51  Weight Loss Percentage: 29.14%    Wt Readings from Last 5 Encounters:   06/19/25 56.2 kg (124 lb)   11/12/24 54.4 kg (120 lb)   11/01/24 54.5 kg (120 lb 3.2 oz)   10/30/24 54 kg (119 lb)   10/15/24 53.5 kg (118 lb)             6/19/2025     1:39 PM   Changes and Difficulties   I have made the following changes to my  "activity/exercise since my last visit: Started personal training         MEDICATIONS:   Current Outpatient Medications   Medication Sig Dispense Refill     phentermine 15 MG capsule Take 1 capsule (15 mg) by mouth every morning. 30 capsule 4     Acetylcysteine (NAC PO) Take 1,000 mg by mouth daily. NAC       ARIPiprazole (ABILIFY) 10 MG tablet Take 1 tablet (10 mg) by mouth every morning. 30 tablet 1     azelastine (ASTELIN) 0.1 % nasal spray Spray 2 sprays into both nostrils as needed for rhinitis or allergies.       Ferrous Sulfate (IRON PO) Take 65 mg by mouth daily.       loratadine (CLARITIN) 10 MG tablet Take 10 mg by mouth daily as needed for allergies.       MAGNESIUM ASPARTATE PO Take 400 mg by mouth daily.       methocarbamol (ROBAXIN) 500 MG tablet Take 1-2 tablets (500-1,000 mg) by mouth at bedtime. (Patient taking differently: Take 500-1,000 mg by mouth as needed.) 30 tablet 1     propranolol (INDERAL) 10 MG tablet Take 1 tablet (10 mg) by mouth daily as needed (for performance anxiety or panic). 30 tablet 1     spironolactone (ALDACTONE) 25 MG tablet Take 1 tablet (25 mg) by mouth daily. (Patient taking differently: Take 100 mg by mouth daily. 100 mg tablets - pt takes 1 tab daily) 30 tablet 2     traZODone (DESYREL) 50 MG tablet Take 1.5-2 tablets ( mg) by mouth at bedtime. 60 tablet 1           6/19/2025     1:39 PM   Weight Loss Medication History Reviewed With Patient   Which weight loss medications are you currently taking on a regular basis? Phentermine   If you are not taking a weight loss medication that was prescribed to you, please indicate why: I did not like the side effects   Are you having any side effects from the weight loss medication that we have prescribed you? Yes   If you are having side effects please describe: topiramate - cognitive side effects         Objective   Ht 1.549 m (5' 1\")   Wt 56.2 kg (124 lb)   BMI 23.43 kg/m           Vitals:  No vitals were obtained today " due to virtual visit.      PHYSICAL EXAM:  GENERAL: alert and no distress  EYES: Eyes grossly normal to inspection.  No discharge or erythema, or obvious scleral/conjunctival abnormalities.  RESP: No audible wheeze, cough, or visible cyanosis.    SKIN: Visible skin clear. No significant rash, abnormal pigmentation or lesions.  NEURO: Cranial nerves grossly intact.  Mentation and speech appropriate for age.  PSYCH: Appropriate affect, tone, and pace of words        Sincerely,    Kate Rasheed PA-C      26 minutes spent by me on the date of the encounter doing chart review, history and exam, documentation and further activities per the note    The longitudinal plan of care for the diagnosis(es)/condition(s) as documented were addressed during this visit. Due to the added complexity in care, I will continue to support Gerson in the subsequent management and with ongoing continuity of care.    Again, thank you for allowing me to participate in the care of your patient.      Sincerely,    Kate Rasheed PA-C

## 2025-06-23 ENCOUNTER — TELEPHONE (OUTPATIENT)
Dept: ENDOCRINOLOGY | Facility: CLINIC | Age: 30
End: 2025-06-23
Payer: COMMERCIAL

## 2025-06-23 NOTE — PROGRESS NOTES
General acute hospital Psychiatry Clinic  Brief Care Team  MEDICAL PROGRESS NOTE       CARE TEAM:    PCP- Siobhan Covington  Therapist- Marycruz Degroot at [facility]   DBT through [facility]    Gerson is a 29 year old who uses the pronouns she/her/hers, they/them/theirs, with history of bipolar 2 disorder, BPD and generalized anxiety disorder.                 Assessment & Plan     Borderline personality disorder  MDD, severe, recurrent   Insomnia, unspecified type     Today, Gerson reports she has been experiencing significantly worsening mood including continued disruption in sleep with 5-7 awakenings overnight. She also reports symptoms consistent with an episode of severe depression including low mood, changes in sleep, appetite, low energy, decrease in self-care and suicidal ideation with recent thought of plan (denies intent). Given reports of suicidal ideation, we completed a [place] safety plan, including safety planning around having her partner store any additional medication away from her access, only having access to limited supply herself.  Completed disability paperwork. Discussed option to utilize increase trazodone in order to improve sleep, provided anticipatory guidance to monitor for any increases in energy/decreased need for sleep, though she notes she believes previous hypomanic episodes were triggered by interpersonal stress.     Psychotropic Drug Interactions:    Blood pressure lowering agents: abilify + propranolol  Seizure-threshold lowering potential: abilify + phentermine  Management: routine monitoring    Risk Statements:   Treatment Risk: Risks, benefits, alternatives and potential adverse effects have been discussed and are understood.   Safety Risk: Gerson did not appear to be an imminent safety risk to self or others.    PLAN    1) Medications:   - increase trazodone to 75 mg-100 mg as needed for sleep   - continue melatonin to 3 mg, 4 hours  before bedtime   - continue aripiprazole (Abilify) 10 mg daily  - continue propranolol 10mg daily as needed for public speaking/panic    Other prescriptions from PCP/weight loss clinic:  -  phentermine   -  spironolactone 100 mg    2) Psychotherapy: continue DBT    3) Next due:  Labs: [facility] labs 08/24: hemoglobin A1c: 5.8, prediabetes range; lipid panel: cholesterol elevated 216-, both increased since last year, discussed this with pt  EKG- No routine monitoring indicated for current psychotropic regimen  Rating scales- AIMS: Determine next due    4) Referrals: none    5) Follow-up: Return to clinic in 6 weeks                 Interval History   - Updates: got decision that claim was denied. First got approved at end of April for 6 weeks because she didn't have a return to work date.     Provided clarification regarding full-time [employer] program: Is in an [employer] program but is not doing well, using accommodations, only reason she is still in it is it's at a certain point in term where can't drop, as full time takes 1 class is spread out over 8 weeks, has certain assignments due ever week no scheduled classes to attend.     - Depression worse currently   -SI: yes thoughts, had plans (take a bunch of medications), but no intent   - SIB: has not self-harmed, has had thoughts, had plans but didn't act   -Esme: not at all, really tired   -Anxiety: high due to work   -Sleep: not good, the same, difficulty falling and staying asleep, not having sleep paralysis, nightmares, can fall asleep quickly again but estimates between 5-7x waking up   -Psychosis: social paranoia, thinks she gets delusional thinking, sees something that reminds her of something and makes an inappropriate comparison  that one caused the other, denies hallucinations, did have visual and auditory.   -Therapy: didn't go to DBT last week, cancelled all appointments last week, couldn't function  -Medications: treatment adherence has been less, depressive  symptoms. Trazodone 50 mg did not help with sleep    Functional impairments for work:   Manage high stress interpersonal situations, deescalate clients, manage interpersonal disputes between clients and staff, coordinate services for members with internal and external providers, oversee staff, supporting vulnerable individuals     Cognitive functioning, not being able to focus, keeping up with tasks at home, chores/ADLS, paying bills, eating, sleeping, a lot of social anxiety. Difficulty interacting with people outside, leads to avoidance of people     End date for disability was August 4th, amount of time she thought she needed, doesn't know if ready to go back then. Was working 40 hours a week or more. Reports her position does not offer decreased hours. DBT is 2 hours of group and 1 hour of individual, 3 hours and then expected to complete homework outside of group     Current Social History:  Financial/occupational:  Manager at [employer]   Living situation: independently in apartment   Social/spiritual support:  it's fine, has good friends and close with family       Pertinent Substance Use:  [Last updated 09/02/24]  Alcohol: Yes: socially occasionally, not often    Cannabis: none   Tobacco: Yes: vapes daily   Caffeine:  Yes: daily 2 cups of coffee   Opioids: No   Narcan Kit current: N/A    Contraception: Yes: on pill                 Summary Points of Current Care  09/2024: Increased Abilify to 5mg daily for low mood. Continued to recommend DBT  12/2024: Added Intuniv for increased irritability, coordinated with PCP who decreased spironolactone in conjunction  05/2025: increase abilify to 10 mg daily for mood stabilization  05/2025: add hydroxyzine 12.5-25 mg for sleep maintenance/anxiety  06/2025: increase trazodone to 75 -100 mg at bedtime                   Physical Exam  (Vitals Only)    There were no vitals taken for this visit.  Pulse Readings from Last 3 Encounters:   04/28/25 89   04/17/25 76    11/01/24 76     Wt Readings from Last 3 Encounters:   06/19/25 56.2 kg (124 lb)   11/12/24 54.4 kg (120 lb)   11/01/24 54.5 kg (120 lb 3.2 oz)     BP Readings from Last 3 Encounters:   04/28/25 107/72   04/17/25 99/67   11/01/24 105/68                      Mental Status Exam    Alertness: alert  and oriented  Appearance: casually groomed  Behavior/Demeanor: cooperative and calm, with good  eye contact   Speech: normal and regular rate and rhythm  Language: intact and no problems  Psychomotor: normal or unremarkable  Mood: depressed and overwhelmed    Affect: restricted; congruent to: mood- yes, content- yes  Thought Process/Associations: unremarkable  Thought Content:  Reports suicidal ideation;  Denies none  Perception:  Reports paranoia, particularly socially;  Denies hallucinations  Insight: good  Judgment: fair  Cognition: does  appear grossly intact; formal cognitive testing was not done  Gait and Station: N/A (telehealth)                  Past Psychotropic Medication Trials    Medication Max Dose (mg) Dates / Duration Helpful? DC Reason / Adverse Effects?   Zoloft       Triggered hypomania   Abilify 2.5   Y     Lexapro 5   Y     Lamotrigine 200  1/2024 N                      Past Medical History     Patient Active Problem List   Diagnosis    Acne    Bipolar 2 disorder (H)    Environmental allergies    Chronic bilateral thoracic back pain    Chronic pain of both shoulders    Neck pain    Borderline personality disorder (H)                   Medications     Current Outpatient Medications   Medication Sig Dispense Refill    Acetylcysteine (NAC PO) Take 1,000 mg by mouth daily. NAC      ARIPiprazole (ABILIFY) 10 MG tablet Take 1 tablet (10 mg) by mouth every morning. 30 tablet 1    azelastine (ASTELIN) 0.1 % nasal spray Spray 2 sprays into both nostrils as needed for rhinitis or allergies.      Ferrous Sulfate (IRON PO) Take 65 mg by mouth daily.      hydrOXYzine HCl (ATARAX) 25 MG tablet Take 0.5-1 tablets  (12.5-25 mg) by mouth nightly as needed for other or anxiety (sleep). 30 tablet 1    loratadine (CLARITIN) 10 MG tablet Take 10 mg by mouth daily as needed for allergies.      MAGNESIUM ASPARTATE PO Take 400 mg by mouth daily.      methocarbamol (ROBAXIN) 500 MG tablet Take 1-2 tablets (500-1,000 mg) by mouth at bedtime. (Patient taking differently: Take 500-1,000 mg by mouth as needed.) 30 tablet 1    phentermine 15 MG capsule Take 1 capsule (15 mg) by mouth every morning. 30 capsule 4    propranolol (INDERAL) 10 MG tablet Take 1 tablet (10 mg) by mouth daily as needed (for performance anxiety or panic). 30 tablet 1    spironolactone (ALDACTONE) 25 MG tablet Take 1 tablet (25 mg) by mouth daily. 30 tablet 2    traZODone (DESYREL) 50 MG tablet Take 0.5-1 tablets (25-50 mg) by mouth at bedtime. 30 tablet 0                   Data         12/3/2024     9:24 AM 5/13/2025     7:52 AM 6/19/2025     1:38 PM   PROMIS-10 Total Score w/o Sub Scores   PROMIS TOTAL - SUBSCORES 28  26  18        Patient-reported    Proxy-reported         4/28/2025     2:43 PM 5/13/2025     7:50 AM 6/24/2025    10:24 AM   PHQ-9 SCORE   PHQ-9 Total Score MyChart 19 (Moderately severe depression) 8 (Mild depression) 22 (Severe depression)   PHQ-9 Total Score 19  8  22        Patient-reported    Proxy-reported         1/24/2024     9:41 AM 2/13/2024     8:00 AM 6/5/2024     3:51 PM   BLANCA-7 SCORE   Total Score 14 (moderate anxiety) 20 (severe anxiety) 20 (severe anxiety)   Total Score 14    14 20 20       Liver/Kidney Function, TSH Metabolic Blood counts   Recent Labs   Lab Test 11/01/24 0942 08/06/24 0926   AST  --  18   ALT  --  22   ALKPHOS  --  50   CR 0.80 0.80     Recent Labs   Lab Test 11/01/24 0942   TSH 1.30    Recent Labs   Lab Test 08/06/24 0926   CHOL 216*   TRIG 114   *   HDL 63     Recent Labs   Lab Test 08/06/24 0926   A1C 5.8*     Recent Labs   Lab Test 11/01/24  0942   GLC 91    Recent Labs   Lab Test 11/01/24 0942   WBC  7.4   HGB 13.6   HCT 40.7   MCV 89              Level of Medical Decision Making:   - At least 1 chronic problem that is not stable  - Engaged in prescription drug management during visit (discussed any medication benefits, side effects, alternatives, etc.)     The longitudinal plan of care for the diagnosis(es)/condition(s) as documented were addressed during this visit. Due to the added complexity in care, I will continue to support Gerson in the subsequent management and with ongoing continuity of care.       PROVIDER: Yari Dangelo MD    Patient staffed in clinic with Dr. Glaser who will sign the note.  Supervisor is Dr. Wilhelm.

## 2025-06-23 NOTE — TELEPHONE ENCOUNTER
Patient confirmed scheduled appointment:     Date: 11/28/25  & Wait list   Time: 2PM  Visit type: Return Weight Management  Visit mode: Virtual Visit  Provider:  Kate Rasheed PA-C  [place] [facility] [place]:   4 mo follow up

## 2025-06-24 ENCOUNTER — VIRTUAL VISIT (OUTPATIENT)
Dept: PSYCHIATRY | Facility: CLINIC | Age: 30
End: 2025-06-24
Attending: PSYCHIATRY & NEUROLOGY
Payer: COMMERCIAL

## 2025-06-24 ENCOUNTER — TELEPHONE (OUTPATIENT)
Dept: PSYCHIATRY | Facility: CLINIC | Age: 30
End: 2025-06-24
Payer: COMMERCIAL

## 2025-06-24 DIAGNOSIS — G47.00 INSOMNIA, UNSPECIFIED TYPE: ICD-10-CM

## 2025-06-24 DIAGNOSIS — F33.2 SEVERE EPISODE OF RECURRENT MAJOR DEPRESSIVE DISORDER, WITHOUT PSYCHOTIC FEATURES (H): ICD-10-CM

## 2025-06-24 DIAGNOSIS — F60.3 BORDERLINE PERSONALITY DISORDER (H): Primary | ICD-10-CM

## 2025-06-24 PROCEDURE — G2211 COMPLEX E/M VISIT ADD ON: HCPCS | Mod: 95

## 2025-06-24 PROCEDURE — 98006 SYNCH AUDIO-VIDEO EST MOD 30: CPT | Mod: GC

## 2025-06-24 RX ORDER — TRAZODONE HYDROCHLORIDE 50 MG/1
75-100 TABLET ORAL AT BEDTIME
Qty: 60 TABLET | Refills: 1 | Status: SHIPPED | OUTPATIENT
Start: 2025-06-24

## 2025-06-24 RX ORDER — PROPRANOLOL HYDROCHLORIDE 10 MG/1
10 TABLET ORAL DAILY PRN
Qty: 30 TABLET | Refills: 1 | Status: SHIPPED | OUTPATIENT
Start: 2025-06-24

## 2025-06-24 RX ORDER — ARIPIPRAZOLE 10 MG/1
10 TABLET ORAL EVERY MORNING
Qty: 30 TABLET | Refills: 1 | Status: SHIPPED | OUTPATIENT
Start: 2025-06-24

## 2025-06-24 ASSESSMENT — PATIENT HEALTH QUESTIONNAIRE - PHQ9
SUM OF ALL RESPONSES TO PHQ QUESTIONS 1-9: 22
SUM OF ALL RESPONSES TO PHQ QUESTIONS 1-9: 22
10. IF YOU CHECKED OFF ANY PROBLEMS, HOW DIFFICULT HAVE THESE PROBLEMS MADE IT FOR YOU TO DO YOUR WORK, TAKE CARE OF THINGS AT HOME, OR GET ALONG WITH OTHER PEOPLE: VERY DIFFICULT

## 2025-06-24 ASSESSMENT — PAIN SCALES - GENERAL: PAINLEVEL_OUTOF10: MODERATE PAIN (5)

## 2025-06-24 NOTE — PATIENT INSTRUCTIONS
Medications   - increase trazodone to 75 mg-100 mg as needed for sleep   - continue melatonin to 3 mg, 4 hours before bedtime   - continue aripiprazole (Abilify) 10 mg daily  - continue propranolol 10mg daily as needed for public speaking/panic    Continue DBT     **For crisis resources, please see the information at the end of this document**   Patient Education    Thank you for coming to the [facility] [place] [facility].     Lab Testing:  If you had lab testing today and your results are reassuring or normal they will be mailed to you or sent through [employer] within 7 days. If the lab tests need quick action we will call you with the results. The phone number we will call with results is # [phone]. If this is not the best number please call our clinic and change the number.     Medication Refills:  If you need any refills please call your pharmacy and they will contact us. Our fax number for refills is [phone].   Three business days of notice are needed for general medication refill requests.   Five business days of notice are needed for controlled substance refill requests.   If you need to change to a different pharmacy, please contact the new pharmacy directly. The new pharmacy will help you get your medications transferred.     Contact Us:  Please call [phone] during business hours (8-5:00 M-F).   If you have medication related questions after clinic hours, or on the weekend, please call [phone].     Financial Assistance [phone]   Medical Records [phone]       MENTAL HEALTH CRISIS RESOURCES:  For a emergency help, please call 911 or go to the nearest Emergency Department.     Emergency Walk-In Options:   Em[facility] Unit @ [facility] ([place]): [phone] - Specialized mental health emergency area designed to be calming  [facility] ([place]): [phone]  [facility] ([place]):  [phone]  [facility] ([place]): [phone]    [facility] Crisis Information:   [place]: [phone]  [place]: [phone]  Deon ARCINIEGA) - Adult: [phone]     Child: [phone]  [place] - Adult: [phone]     Child: [phone]  [place]: [phone]  List of all [facility] resources:   https://[place].[facility]/dhs/people-we-serve/adults/health-care/mental-health/resources/crisis-contacts.jsp    National Crisis Information:   Crisis Text Line: Text  [place]  to 818339  Suicide & Crisis Lifeline: 988  National Suicide Prevention Lifeline: 3-189-014-TALK (1-636.523.4940)       For online chat options, visit https://suicidepreventionlifeline.org/chat/  Poison Control Center: 1-416.666.3481  Trans Lifeline: 3-999-376-1983 - Hotline for transgender people of all ages  [employer]: 3-659-833-0134 - Hotline for LGBT youth     For Non-Emergency Support:   Fast Tracker: Mental Health & Substance Use Disorder Resources -   https://www.[employer].org/

## 2025-06-24 NOTE — NURSING NOTE
Current patient location: [address] [place] [address]    Is the patient currently in the state of [place]? YES    Visit mode: VIDEO    If the visit is dropped, the patient can be reconnected by:VIDEO VISIT: Text to cell phone:   Telephone Information:   Mobile [phone]       Will anyone else be joining the visit? NO  (If patient encounters technical issues they should call [phone])    Are changes needed to the allergy or medication list? Yes please remove the following from med list per pt:  -hydrOXYzine HCl (ATARAX) 25 MG tablet   -MAGNESIUM ASPARTATE PO     Are refills needed on medications prescribed by this physician? NO    Rooming Documentation:  Patient will complete questionnaire(s) in [facility].    Reason for visit: RANDALL HARRIS

## 2025-06-24 NOTE — PROGRESS NOTES
Virtual Visit Details    Type of service:  Video Visit   Start: 1037  End: 1135    Originating Location (pt. Location): Home  {PROVIDER LOCATION On-site should be selected for visits conducted from your clinic location or adjoining [facility] hospital, academic office, or other nearby [facility] building. Off-site should be selected for all other provider locations, including home:[id number]}  Distant Location (provider location):  On-site  Platform used for Video Visit: [employer]

## 2025-06-30 NOTE — PATIENT INSTRUCTIONS
Visit Plan:     Continue Phentermine 15mg in the morning. Monitor mood closely. You are ok to discontinue if you think Phentermine starts to influence your mood.   Kate Figueroa PA-C in 4 months

## 2025-07-06 RX ORDER — SPIRONOLACTONE 100 MG/1
100 TABLET, FILM COATED ORAL DAILY
COMMUNITY
Start: 2025-01-24

## 2025-07-06 RX ORDER — TRIAMCINOLONE ACETONIDE 1 MG/G
CREAM TOPICAL
COMMUNITY
Start: 2025-05-14

## 2025-07-06 RX ORDER — EPINEPHRINE 0.3 MG/.3ML
1 INJECTION SUBCUTANEOUS PRN
COMMUNITY
Start: 2024-12-20 | End: 2025-12-20

## 2025-07-09 ENCOUNTER — MYC MEDICAL ADVICE (OUTPATIENT)
Dept: PSYCHIATRY | Facility: CLINIC | Age: 30
End: 2025-07-09
Payer: COMMERCIAL

## 2025-07-09 NOTE — LETTER
"2025       RE: Cindy Contreras  [address]  [place] [address]  : 1995     To whom it may concern:     I am writing to provide a differing opinion with the findings reported in the appeal decision from the [employer] regarding Ms. Contreras's behavioral health assessment.     Regarding her previous enrollment in an [employer] program, as I stated in my previous letter, she was not functioning at a level to adequately engage in the asynchronous work. Of note, she has since withdrawn from the [employer] program due to severity of her symptoms and ability to function in this manner.     It was also referenced in the appeal decision that because Ms. Contreras had attended DBT groups, this indicated stable mental functioning. In my professional opinion, whether a patient attends a recommended therapy group does not preclude them from having debilitating mental health symptoms. The DBT program is a ford part of Ms. Contreras's recommended treatment plan, so I disagree that this is \"further indicating stable mental functioning\".     As you state in the most recent letter to Ms. Contreras, \"disability is determined by medically supported functional limitations and restrictions which would preclude the ability to perform your regular occupation.\" As of our appointment on 25, her psychiatric symptoms were notably exacerbated, including active major depression with suicidal and self-harm thoughts, significant disruptions in sleep (waking up 5-7 times in the night), social anxiety, emotional dysregulation, and unable to maintain routines such as taking medications daily and basic hygiene. 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 More specifically, they prevented her from being able to manage high stress interpersonal " situations, deescalate clients, manage interpersonal disputes between clients and staff, coordinate services for members with internal and external providers, and support vulnerable individuals. Therefore, in my professional opinion, these disabling symptoms did prevent Ms. Contreras from carrying out her work responsibilities during this period of time and I am recommending support for her leave until 8/4/25 in order to allow for time to fully engage in therapeutic programming and for medications to take full effect.      Thank you for your consideration.     Sincerely,        Yari Dangelo MD

## 2025-07-09 NOTE — Clinical Note
7/9/2025       RE: Cindy Contreras  [address]  [place] [address]     Dear Colleague,    Thank you for referring your patient, Cindy Contreras, to the [facility] [place] [facility] at [facility]. Please see a copy of my visit note below.    Letter completed by Dr. Dangelo. This was sent to the patient via [employer].    Liberty Mccoy RN on 7/14/2025 at 1:20 PM      Writer spoke to the patient who is requesting Dr Dangelo complete an updated letter stating the appeal letter is in reference to the time period of 5/22/25 and continuing, ongoing. The patient requests this letter be emailed to her at [email or web address]. Writer will send a message to Dr. Dangelo.Anel Kumar LPN Lead      Hi     I will run this by them and let you know if they want any changes.     Thanks    Anel        Again, thank you for allowing me to participate in the care of your patient.      Sincerely,    Yari Dangelo MD

## 2025-07-09 NOTE — LETTER
July 30, 2025      TO: Cindy Contreras  [address] [place] [address]           To Whom It May Concern,      The appeal letter, dated July 14, 2025 was intended to cover the patient from May 22, 2025 thru August 7, 2025 (her next scheduled appointment) where she will be assessed for progress and stability.          Sincerely,      Yari Dangelo MD

## 2025-07-14 NOTE — TELEPHONE ENCOUNTER
Letter completed by Dr. Dangelo. This was sent to the patient via [employer]CE.    Liberty Mccoy RN on 7/14/2025 at 1:20 PM

## 2025-07-29 NOTE — TELEPHONE ENCOUNTER
Writer spoke to the patient who is requesting Dr Dangelo complete an updated letter stating the appeal letter is in reference to the time period of 5/22/25 and continuing, ongoing. The patient requests this letter be emailed to her at [email or web address]. Writer will send a message to Dr. Dangelo.Anel Kumar LPN Lead

## 2025-07-30 NOTE — TELEPHONE ENCOUNTER
Completed, signed letter was given to this writer by Dr. Thu Dangelo. Writer emailed the completed letter to patient's email, per their request, to [email or web address] Writer contacted the patient, who verified receipt of email and thanked writer.Anel Kumar LPN Lead

## 2025-08-14 ENCOUNTER — TELEPHONE (OUTPATIENT)
Dept: PSYCHIATRY | Facility: CLINIC | Age: 30
End: 2025-08-14
Payer: COMMERCIAL

## 2025-08-18 ENCOUNTER — ALLIED HEALTH/NURSE VISIT (OUTPATIENT)
Dept: OBGYN | Facility: CLINIC | Age: 30
End: 2025-08-18
Payer: COMMERCIAL

## 2025-08-18 DIAGNOSIS — Z30.9 CONTRACEPTION MANAGEMENT: Primary | ICD-10-CM

## 2025-08-18 PROCEDURE — 96372 THER/PROPH/DIAG INJ SC/IM: CPT | Performed by: ADVANCED PRACTICE MIDWIFE

## 2025-08-18 PROCEDURE — 81025 URINE PREGNANCY TEST: CPT

## 2025-08-18 PROCEDURE — 250N000011 HC RX IP 250 OP 636: Performed by: ADVANCED PRACTICE MIDWIFE

## 2025-08-18 RX ADMIN — MEDROXYPROGESTERONE ACETATE 150 MG: 150 INJECTION, SUSPENSION INTRAMUSCULAR at 09:32

## 2025-08-23 DIAGNOSIS — F33.2 SEVERE EPISODE OF RECURRENT MAJOR DEPRESSIVE DISORDER, WITHOUT PSYCHOTIC FEATURES (H): ICD-10-CM

## 2025-08-23 DIAGNOSIS — F60.3 BORDERLINE PERSONALITY DISORDER (H): ICD-10-CM

## 2025-08-26 RX ORDER — ARIPIPRAZOLE 10 MG/1
10 TABLET ORAL EVERY MORNING
Qty: 30 TABLET | Refills: 0 | Status: SHIPPED | OUTPATIENT
Start: 2025-08-26

## 2025-09-02 ENCOUNTER — VIRTUAL VISIT (OUTPATIENT)
Dept: PSYCHIATRY | Facility: CLINIC | Age: 30
End: 2025-09-02
Payer: COMMERCIAL

## 2025-09-02 VITALS — HEIGHT: 61 IN | WEIGHT: 130 LBS | BODY MASS INDEX: 24.55 KG/M2

## 2025-09-02 DIAGNOSIS — G47.00 INSOMNIA, UNSPECIFIED TYPE: ICD-10-CM

## 2025-09-02 DIAGNOSIS — F60.3 BORDERLINE PERSONALITY DISORDER (H): Primary | ICD-10-CM

## 2025-09-02 DIAGNOSIS — F33.2 SEVERE EPISODE OF RECURRENT MAJOR DEPRESSIVE DISORDER, WITHOUT PSYCHOTIC FEATURES (H): ICD-10-CM

## 2025-09-02 DIAGNOSIS — Z72.0 CURRENT NICOTINE USE: ICD-10-CM

## 2025-09-02 PROBLEM — F31.81 BIPOLAR 2 DISORDER (H): Status: RESOLVED | Noted: 2023-01-23 | Resolved: 2025-09-02

## 2025-09-02 PROCEDURE — 98006 SYNCH AUDIO-VIDEO EST MOD 30: CPT | Mod: GC

## 2025-09-02 PROCEDURE — G2211 COMPLEX E/M VISIT ADD ON: HCPCS | Mod: 95

## 2025-09-02 RX ORDER — BUPROPION HYDROCHLORIDE 300 MG/1
300 TABLET ORAL EVERY MORNING
Qty: 30 TABLET | Refills: 0 | Status: SHIPPED | OUTPATIENT
Start: 2025-09-02

## 2025-09-02 RX ORDER — ARIPIPRAZOLE 5 MG/1
10 TABLET ORAL EVERY MORNING
Qty: 60 TABLET | Refills: 0 | Status: SHIPPED | OUTPATIENT
Start: 2025-09-02

## 2025-09-02 RX ORDER — GABAPENTIN 300 MG/1
300-600 CAPSULE ORAL AT BEDTIME
Qty: 60 CAPSULE | Refills: 0 | Status: SHIPPED | OUTPATIENT
Start: 2025-09-02

## 2025-09-03 ENCOUNTER — MYC MEDICAL ADVICE (OUTPATIENT)
Dept: PSYCHIATRY | Facility: CLINIC | Age: 30
End: 2025-09-03

## 2025-09-03 ENCOUNTER — TELEPHONE (OUTPATIENT)
Dept: PSYCHOLOGY | Facility: CLINIC | Age: 30
End: 2025-09-03

## 2025-09-03 ENCOUNTER — PATIENT OUTREACH (OUTPATIENT)
Dept: CARE COORDINATION | Facility: CLINIC | Age: 30
End: 2025-09-03

## 2025-09-03 ASSESSMENT — ACTIVITIES OF DAILY LIVING (ADL): DEPENDENT_IADLS:: INDEPENDENT

## (undated) DEVICE — SU STRATAFIX MONOCRYL 3-0 SPIRAL PS-2 45CM SXMP1B107

## (undated) DEVICE — GLOVE BIOGEL PI MICRO SZ 7.0 48570

## (undated) DEVICE — PEN MARKING SKIN W/LABELS 31145884

## (undated) DEVICE — ESU ELEC BLADE HEX-LOCKING 2.5" E1450X

## (undated) DEVICE — SUCTION TIP YANKAUER W/O VENT K86

## (undated) DEVICE — ESU GROUND PAD ADULT W/CORD E7507

## (undated) DEVICE — PREP CHLORAPREP 26ML TINTED ORANGE  260815

## (undated) DEVICE — DRAPE IOBAN INCISE 23X17" 6650EZ

## (undated) DEVICE — ESU PENCIL SMOKE EVAC W/ROCKER SWITCH 0703-047-000

## (undated) DEVICE — SPONGE LAP 18X18" X8435

## (undated) DEVICE — PAD CHUX UNDERPAD 30X36" P3036C

## (undated) DEVICE — SOL NACL 0.9% IRRIG 500ML BOTTLE 2F7123

## (undated) DEVICE — SU VICRYL 2-0 TIE 54" J615H

## (undated) DEVICE — GLOVE BIOGEL PI MICRO INDICATOR UNDERGLOVE SZ 7.5 48975

## (undated) DEVICE — STPL SKIN 35W 059037

## (undated) DEVICE — PACK MINOR CUSTOM ASC

## (undated) DEVICE — PEN MARKING SKIN W/PAPER RULER 31145785

## (undated) DEVICE — DRAPE U SPLIT 74X120" 29440

## (undated) DEVICE — PAD ARMBOARD FOAM EGGCRATE COVIDEN 3114367

## (undated) DEVICE — DRSG ABDOMINAL 07 1/2X8" 7197D

## (undated) DEVICE — LINEN TOWEL PACK X5 5464

## (undated) DEVICE — BLADE KNIFE SURG 10 371110

## (undated) DEVICE — SUCTION MANIFOLD NEPTUNE 2 SYS 1 PORT 702-025-000

## (undated) DEVICE — SU MONOCRYL 2-0 SH 27" UND Y417H

## (undated) DEVICE — SU DERMABOND PRINEO 42CM CLR422US

## (undated) RX ORDER — DEXAMETHASONE SODIUM PHOSPHATE 4 MG/ML
INJECTION, SOLUTION INTRA-ARTICULAR; INTRALESIONAL; INTRAMUSCULAR; INTRAVENOUS; SOFT TISSUE
Status: DISPENSED
Start: 2024-04-04

## (undated) RX ORDER — CEFAZOLIN SODIUM 2 G/50ML
SOLUTION INTRAVENOUS
Status: DISPENSED
Start: 2024-04-04

## (undated) RX ORDER — MEDROXYPROGESTERONE ACETATE 150 MG/ML
INJECTION, SUSPENSION INTRAMUSCULAR
Status: DISPENSED
Start: 2025-04-17

## (undated) RX ORDER — TRANEXAMIC ACID 100 MG/ML
INJECTION, SOLUTION INTRAVENOUS
Status: DISPENSED
Start: 2024-04-04

## (undated) RX ORDER — GLYCOPYRROLATE 0.2 MG/ML
INJECTION INTRAMUSCULAR; INTRAVENOUS
Status: DISPENSED
Start: 2024-04-04

## (undated) RX ORDER — DEXMEDETOMIDINE HYDROCHLORIDE 4 UG/ML
INJECTION, SOLUTION INTRAVENOUS
Status: DISPENSED
Start: 2024-04-04

## (undated) RX ORDER — PROPOFOL 10 MG/ML
INJECTION, EMULSION INTRAVENOUS
Status: DISPENSED
Start: 2024-04-04

## (undated) RX ORDER — ACETAMINOPHEN 325 MG/1
TABLET ORAL
Status: DISPENSED
Start: 2024-04-04

## (undated) RX ORDER — FENTANYL CITRATE 50 UG/ML
INJECTION, SOLUTION INTRAMUSCULAR; INTRAVENOUS
Status: DISPENSED
Start: 2024-04-04

## (undated) RX ORDER — OXYCODONE HYDROCHLORIDE 5 MG/1
TABLET ORAL
Status: DISPENSED
Start: 2024-04-04

## (undated) RX ORDER — HYDROMORPHONE HYDROCHLORIDE 1 MG/ML
INJECTION, SOLUTION INTRAMUSCULAR; INTRAVENOUS; SUBCUTANEOUS
Status: DISPENSED
Start: 2024-04-04

## (undated) RX ORDER — MEDROXYPROGESTERONE ACETATE 150 MG/ML
INJECTION, SUSPENSION INTRAMUSCULAR
Status: DISPENSED
Start: 2025-08-18

## (undated) RX ORDER — ONDANSETRON 2 MG/ML
INJECTION INTRAMUSCULAR; INTRAVENOUS
Status: DISPENSED
Start: 2024-04-04